# Patient Record
Sex: MALE | Race: WHITE | NOT HISPANIC OR LATINO | URBAN - METROPOLITAN AREA
[De-identification: names, ages, dates, MRNs, and addresses within clinical notes are randomized per-mention and may not be internally consistent; named-entity substitution may affect disease eponyms.]

---

## 2018-07-26 ENCOUNTER — INPATIENT (INPATIENT)
Facility: HOSPITAL | Age: 68
LOS: 1 days | Discharge: HOME | DRG: 247 | End: 2018-07-28
Attending: INTERNAL MEDICINE | Admitting: HOSPITALIST
Payer: SELF-PAY

## 2018-07-26 VITALS — WEIGHT: 240.08 LBS | HEIGHT: 72 IN

## 2018-07-26 DIAGNOSIS — I20.0 UNSTABLE ANGINA: ICD-10-CM

## 2018-07-26 LAB
ALBUMIN SERPL ELPH-MCNC: 3.9 G/DL — SIGNIFICANT CHANGE UP (ref 3.3–5.2)
ALP SERPL-CCNC: 76 U/L — SIGNIFICANT CHANGE UP (ref 40–120)
ALT FLD-CCNC: 21 U/L — SIGNIFICANT CHANGE UP
ANION GAP SERPL CALC-SCNC: 13 MMOL/L — SIGNIFICANT CHANGE UP (ref 5–17)
AST SERPL-CCNC: 40 U/L — HIGH
BASOPHILS # BLD AUTO: 0 K/UL — SIGNIFICANT CHANGE UP (ref 0–0.2)
BASOPHILS NFR BLD AUTO: 0.3 % — SIGNIFICANT CHANGE UP (ref 0–2)
BILIRUB SERPL-MCNC: 0.2 MG/DL — LOW (ref 0.4–2)
BLD GP AB SCN SERPL QL: SIGNIFICANT CHANGE UP
BUN SERPL-MCNC: 23 MG/DL — HIGH (ref 8–20)
CALCIUM SERPL-MCNC: 9.6 MG/DL — SIGNIFICANT CHANGE UP (ref 8.6–10.2)
CHLORIDE SERPL-SCNC: 98 MMOL/L — SIGNIFICANT CHANGE UP (ref 98–107)
CO2 SERPL-SCNC: 22 MMOL/L — SIGNIFICANT CHANGE UP (ref 22–29)
CREAT SERPL-MCNC: 0.77 MG/DL — SIGNIFICANT CHANGE UP (ref 0.5–1.3)
EOSINOPHIL # BLD AUTO: 0 K/UL — SIGNIFICANT CHANGE UP (ref 0–0.5)
EOSINOPHIL NFR BLD AUTO: 0.3 % — SIGNIFICANT CHANGE UP (ref 0–5)
GLUCOSE SERPL-MCNC: 149 MG/DL — HIGH (ref 70–115)
HCT VFR BLD CALC: 46.9 % — SIGNIFICANT CHANGE UP (ref 42–52)
HGB BLD-MCNC: 15.7 G/DL — SIGNIFICANT CHANGE UP (ref 14–18)
LYMPHOCYTES # BLD AUTO: 1 K/UL — SIGNIFICANT CHANGE UP (ref 1–4.8)
LYMPHOCYTES # BLD AUTO: 11.7 % — LOW (ref 20–55)
MCHC RBC-ENTMCNC: 29 PG — SIGNIFICANT CHANGE UP (ref 27–31)
MCHC RBC-ENTMCNC: 33.5 G/DL — SIGNIFICANT CHANGE UP (ref 32–36)
MCV RBC AUTO: 86.7 FL — SIGNIFICANT CHANGE UP (ref 80–94)
MONOCYTES # BLD AUTO: 0.2 K/UL — SIGNIFICANT CHANGE UP (ref 0–0.8)
MONOCYTES NFR BLD AUTO: 2.8 % — LOW (ref 3–10)
NEUTROPHILS # BLD AUTO: 7.4 K/UL — SIGNIFICANT CHANGE UP (ref 1.8–8)
NEUTROPHILS NFR BLD AUTO: 84.6 % — HIGH (ref 37–73)
PLATELET # BLD AUTO: 205 K/UL — SIGNIFICANT CHANGE UP (ref 150–400)
POTASSIUM SERPL-MCNC: 5.2 MMOL/L — SIGNIFICANT CHANGE UP (ref 3.5–5.3)
POTASSIUM SERPL-SCNC: 5.2 MMOL/L — SIGNIFICANT CHANGE UP (ref 3.5–5.3)
PROT SERPL-MCNC: 7.7 G/DL — SIGNIFICANT CHANGE UP (ref 6.6–8.7)
RBC # BLD: 5.41 M/UL — SIGNIFICANT CHANGE UP (ref 4.6–6.2)
RBC # FLD: 14.4 % — SIGNIFICANT CHANGE UP (ref 11–15.6)
SODIUM SERPL-SCNC: 133 MMOL/L — LOW (ref 135–145)
TROPONIN T SERPL-MCNC: 0.05 NG/ML — SIGNIFICANT CHANGE UP (ref 0–0.06)
WBC # BLD: 8.7 K/UL — SIGNIFICANT CHANGE UP (ref 4.8–10.8)
WBC # FLD AUTO: 8.7 K/UL — SIGNIFICANT CHANGE UP (ref 4.8–10.8)

## 2018-07-26 PROCEDURE — 71045 X-RAY EXAM CHEST 1 VIEW: CPT | Mod: 26

## 2018-07-26 PROCEDURE — 99223 1ST HOSP IP/OBS HIGH 75: CPT

## 2018-07-26 PROCEDURE — 93010 ELECTROCARDIOGRAM REPORT: CPT | Mod: 76

## 2018-07-26 PROCEDURE — 93306 TTE W/DOPPLER COMPLETE: CPT | Mod: 26

## 2018-07-26 PROCEDURE — 99285 EMERGENCY DEPT VISIT HI MDM: CPT

## 2018-07-26 RX ORDER — NICOTINE POLACRILEX 2 MG
1 GUM BUCCAL DAILY
Qty: 0 | Refills: 0 | Status: DISCONTINUED | OUTPATIENT
Start: 2018-07-26 | End: 2018-07-28

## 2018-07-26 RX ORDER — TICAGRELOR 90 MG/1
90 TABLET ORAL
Qty: 0 | Refills: 0 | Status: DISCONTINUED | OUTPATIENT
Start: 2018-07-26 | End: 2018-07-27

## 2018-07-26 RX ORDER — ATORVASTATIN CALCIUM 80 MG/1
80 TABLET, FILM COATED ORAL AT BEDTIME
Qty: 0 | Refills: 0 | Status: DISCONTINUED | OUTPATIENT
Start: 2018-07-26 | End: 2018-07-28

## 2018-07-26 RX ORDER — ACETAMINOPHEN 500 MG
650 TABLET ORAL EVERY 6 HOURS
Qty: 0 | Refills: 0 | Status: DISCONTINUED | OUTPATIENT
Start: 2018-07-26 | End: 2018-07-28

## 2018-07-26 RX ORDER — LOSARTAN POTASSIUM 100 MG/1
25 TABLET, FILM COATED ORAL DAILY
Qty: 0 | Refills: 0 | Status: DISCONTINUED | OUTPATIENT
Start: 2018-07-26 | End: 2018-07-28

## 2018-07-26 RX ORDER — ATORVASTATIN CALCIUM 80 MG/1
20 TABLET, FILM COATED ORAL AT BEDTIME
Qty: 0 | Refills: 0 | Status: DISCONTINUED | OUTPATIENT
Start: 2018-07-26 | End: 2018-07-26

## 2018-07-26 RX ORDER — SODIUM CHLORIDE 9 MG/ML
500 INJECTION INTRAMUSCULAR; INTRAVENOUS; SUBCUTANEOUS
Qty: 0 | Refills: 0 | Status: DISCONTINUED | OUTPATIENT
Start: 2018-07-26 | End: 2018-07-27

## 2018-07-26 RX ORDER — ONDANSETRON 8 MG/1
4 TABLET, FILM COATED ORAL ONCE
Qty: 0 | Refills: 0 | Status: COMPLETED | OUTPATIENT
Start: 2018-07-26 | End: 2018-07-26

## 2018-07-26 RX ORDER — METOPROLOL TARTRATE 50 MG
50 TABLET ORAL
Qty: 0 | Refills: 0 | Status: DISCONTINUED | OUTPATIENT
Start: 2018-07-26 | End: 2018-07-26

## 2018-07-26 RX ORDER — ASPIRIN/CALCIUM CARB/MAGNESIUM 324 MG
325 TABLET ORAL ONCE
Qty: 0 | Refills: 0 | Status: COMPLETED | OUTPATIENT
Start: 2018-07-26 | End: 2018-07-26

## 2018-07-26 RX ORDER — ASPIRIN/CALCIUM CARB/MAGNESIUM 324 MG
81 TABLET ORAL DAILY
Qty: 0 | Refills: 0 | Status: DISCONTINUED | OUTPATIENT
Start: 2018-07-27 | End: 2018-07-28

## 2018-07-26 RX ORDER — ONDANSETRON 8 MG/1
4 TABLET, FILM COATED ORAL EVERY 8 HOURS
Qty: 0 | Refills: 0 | Status: DISCONTINUED | OUTPATIENT
Start: 2018-07-26 | End: 2018-07-28

## 2018-07-26 RX ADMIN — ONDANSETRON 4 MILLIGRAM(S): 8 TABLET, FILM COATED ORAL at 19:13

## 2018-07-26 RX ADMIN — LOSARTAN POTASSIUM 25 MILLIGRAM(S): 100 TABLET, FILM COATED ORAL at 22:24

## 2018-07-26 RX ADMIN — SODIUM CHLORIDE 50 MILLILITER(S): 9 INJECTION INTRAMUSCULAR; INTRAVENOUS; SUBCUTANEOUS at 22:24

## 2018-07-26 RX ADMIN — TICAGRELOR 90 MILLIGRAM(S): 90 TABLET ORAL at 22:23

## 2018-07-26 RX ADMIN — ATORVASTATIN CALCIUM 80 MILLIGRAM(S): 80 TABLET, FILM COATED ORAL at 22:23

## 2018-07-26 RX ADMIN — Medication 30 MILLILITER(S): at 19:13

## 2018-07-26 NOTE — PROGRESS NOTE ADULT - SUBJECTIVE AND OBJECTIVE BOX
s/p [facility]   thrombectomy for ISR  STEPHAN RICHY 4.5 x 30 mm mRCA  STEPHAN RICHY 3.0 x 22 mm dRCA  Tolerated procedure well  Continues to feel nausea received Zofran          REVIEW OF SYSTEMS:  Denies SOB, CP, NV, HA, dizziness, palpitations, site pain    PHYSICAL EXAM: A&Ox3 NAD Skin warm and dry  NEURO: Speech intact +gag +swallow Tongue midline DODSON  NECK: No JVD, trachea midline. Eupneic  HEART:   PULMONARY:  CTA [place]  ABDOMEN: Soft nontender X4 +BS Vdg/eating  EXTREMITIES: Rt Radial site: Rt radial pulse + w/pulse ox on right index finger SaO2>95% RUE w/oneurovascular deficit. Capillary refill <3 sec s/p [facility]   thrombectomy for ISR  STEPHAN RICHY 4.5 x 30 mm mRCA  STEPHAN RICHY 3.0 x 22 mm dRCA  Tolerated procedure well  Continues to feel nausea received Zofran          REVIEW OF SYSTEMS:  Denies SOB, CP, NV, HA, dizziness, palpitations, site pain    PHYSICAL EXAM: A&Ox3 NAD Skin warm and dry  NEURO: Speech intact +gag +swallow Tongue midline DODSON  NECK: No JVD, trachea midline. Eupneic  HEART: RRR S1S2 Tele SR with BBB  PULMONARY:  CTA [place]  2L nasal O2  ABDOMEN: Soft nontender X4 +BS V  EXTREMITIES: Rt Radial site: Rt radial pulse + w/pulse ox on right index finger SaO2>95% RUE w/oneurovascular deficit. Capillary refill <3 sec

## 2018-07-26 NOTE — ED PROVIDER NOTE - OBJECTIVE STATEMENT
66 y/o M, with hx of HTN, HLD, and 7x stents, presents to the ED c/o mid sternal, burning, non radiating, chest pain, onset this morning.  Pt states pain last approximately 2-3 hours.  Pain onset when pt was drinking his coffee this morning.  Denies aggravating factors.  Pt states that sx improved with exertion and after belching.  Pt states that sx have since resolved.  Notes he was last seen by cardiology last year and goes yearly for follow up appointments.  Denies diaphoresis, N/V, abd pain, SOB, cough, or dizziness.  Cardiologist: Dr. Del Valle

## 2018-07-26 NOTE — H&P ADULT - NSHPLABSRESULTS_GEN_ALL_CORE
15.7   8.7    )----------(  205       ( 26 Jul 2018 10:12 )               46.9      133    |  98     |  23.0   ----------------------------<  149        ( 26 Jul 2018 09:58 )  5.2     |  22.0   |  0.77     Ca    9.6        ( 26 Jul 2018 09:58 )    TPro  7.7    /  Alb  3.9    /  TBili  0.2    /  DBili  x      /  AST  40     /  ALT  21     /  AlkPhos  76     ( 26 Jul 2018 09:58 )    LIVER FUNCTIONS - ( 26 Jul 2018 09:58 )  Alb: 3.9 g/dL / Pro: 7.7 g/dL / ALK PHOS: 76 U/L / ALT: 21 U/L / AST: 40 U/L / GGT: x               CAPILLARY BLOOD GLUCOSE    CARDIAC MARKERS ( 26 Jul 2018 09:58 )  x     / 0.05 ng/mL / x     / x     / x          chest xray normal     ekg: t wave inversions

## 2018-07-26 NOTE — ED ADULT TRIAGE NOTE - CHIEF COMPLAINT QUOTE
Patient arrived to ED today with c/o chest pain.  Patient states he has 7 cardiac stents. Cardio Suravi

## 2018-07-26 NOTE — CONSULT NOTE ADULT - SUBJECTIVE AND OBJECTIVE BOX
[facility], [facility]                                   [address] [place] [address]                                                      PHONE: [phone]                                                         FAX: [phone]  [email or web address]  ---------------------------------------------------------------------------------------------------------------------------------    Reason for Consult: chest pain     HPI  67 year old male with history of CAD/MI 2010 PCI to RCA LAD and Diag normal EF NUC 2011 normal perfusion active smoker poor out patient follow up who presents with chest pain mid sternal 5/10 burning none radiating that started this morning lasted couple of hours.  Current chest pain free.  Complaint with CV medications.  Active smoker      PAST MEDICAL & SURGICAL HISTORY:   CAD PCI 2010     No Known Allergies      MEDICATIONS  (STANDING):    MEDICATIONS  (PRN):      Social History:  Cigarettes:       active smoker              Alchohol:    none             Illicit Drug Abuse:  none    ROS: Negative other than as mentioned in HPI.    Vital Signs Last 24 Hrs  T(C): 36.9 (26 Jul 2018 09:13), Max: 36.9 (26 Jul 2018 09:13)  T(F): 98.5 (26 Jul 2018 09:13), Max: 98.5 (26 Jul 2018 09:13)  HR: 47 (26 Jul 2018 09:13) (47 - 47)  BP: 143/76 (26 Jul 2018 09:13) (143/76 - 143/76)  BP(mean): --  RR: 18 (26 Jul 2018 09:13) (18 - 18)  SpO2: 97% (26 Jul 2018 09:13) (97% - 97%)  ICU Vital Signs Last 24 Hrs  KIRSTEN KAUFMAN  I&O's Detail    I&O's Summary    Drug Dosing Weight  KIRSTEN KAUFMAN      PHYSICAL EXAM:  General: Appears well developed, well nourished alert and cooperative.  HEENT: Head; normocephalic, atraumatic.  Eyes: Pupils reactive, cornea wnl.  Neck: Supple, no nodes adenopathy, no NVD or carotid bruit or thyromegaly.  CARDIOVASCULAR: Normal S1 and S2, No murmur, rub, gallop or lift.   LUNGS: No rales, rhonchi or wheeze. Normal breath sounds bilaterally.  ABDOMEN: Soft, nontender without mass or organomegaly. bowel sounds normoactive.  EXTREMITIES: No clubbing, cyanosis or edema. Distal pulses wnl.   SKIN: warm and dry with normal turgor.  NEURO: Alert/oriented x 3/normal motor exam. No pathologic reflexes.    PSYCH: normal affect.        LABS:                        15.7   8.7   )-----------( 205      ( 26 Jul 2018 10:12 )             46.9     07-26    133<L>  |  98  |  23.0<H>  ----------------------------<  149<H>  5.2   |  22.0  |  0.77    Ca    9.6      26 Jul 2018 09:58    TPro  7.7  /  Alb  3.9  /  TBili  0.2<L>  /  DBili  x   /  AST  40<H>  /  ALT  21  /  AlkPhos  76  07-26    KIRSTEN KAUFMAN  CARDIAC MARKERS ( 26 Jul 2018 09:58 )  x     / 0.05 ng/mL / x     / x     / x                RADIOLOGY & ADDITIONAL STUDIES:    INTERPRETATION OF TELEMETRY (personally reviewed):    ECG: NSR none specific inferior changes     ECHO: 2010 normal EF without any sign valvular disease     STRESS TEST: 2011 normal EF     CARDIAC CATHETERIZATION: 2010 patent RCA stents mid LAD STEPHAN Diag PCI     Assessment and Plan:  In summary, KIRSTEN KAUFMAN is an 67y Male with past medical history significant for 67 year old male with history of CAD/MI 2010 PCI to RCA LAD and Diag normal EF NUC 2011 normal perfusion active smoker poor out patient follow up who presents with chest pain mid sternal 5/10 burning none radiating that started this morning lasted couple of hours.  Current chest pain free.  Complaint with CV medications.  Active smoker.  Volume status stable     Plan  1.  Left heart cath today   2.  Continue ASA statin and other CV medications   3.  Smoking cessations     further CV recommendations pending cath findings

## 2018-07-26 NOTE — H&P ADULT - NSHPPHYSICALEXAM_GEN_ALL_CORE
PHYSICAL EXAM:    GENERAL: NAD,   HEAD:  Atraumatic, Normocephalic  EYES: EOMI, PERRLA, conjunctiva and sclera clear  ENMT: No tonsillar erythema, exudates, or enlargement  NECK: Supple, No JVD, Normal thyroid  NERVOUS SYSTEM:  Alert & Oriented X3, Good concentration; Motor Strength 5/5 B/L upper and lower extremities;  CHEST/LUNG: Clear to percussion bilaterally;   CVS: s1s2+ no murmurs  ABDOMEN: Soft, Nontender, Nondistended; Bowel sounds present  EXTREMITIES:  2+ Peripheral Pulses, No edema  LYMPH: No lymphadenopathy noted  SKIN: No rashes or lesions

## 2018-07-26 NOTE — ED ADULT NURSE NOTE - OBJECTIVE STATEMENT
67 yr old male with h/o htn, high cholesterol and 7 stents present to the ER for epigastric pain since this morning . Pt reported that he drinks over 5 cups of coffee per day. Pt also report that he smokes 1 pack of cigarette per day. Report burning pain of 9/10 on pain scale. Pt reported that he took 4 baby Aspirin which alleviated the pain. Denies epigastric burning at this time. Denies N/V/D or SOB.

## 2018-07-26 NOTE — PROGRESS NOTE ADULT - SUBJECTIVE AND OBJECTIVE BOX
Cardiology NP    ASA 2  Mallampati 2    67 year old male with h/o 7 prior cardiac stent who p/w chest pain.  Echo done with results pending.  For [facility] to assess coronary arteries

## 2018-07-26 NOTE — ED PROVIDER NOTE - PMH
HLD (hyperlipidemia)    HTN (hypertension)    Stented coronary artery  x7, last stent placed in 2009

## 2018-07-26 NOTE — ED PROVIDER NOTE - MEDICAL DECISION MAKING DETAILS
Pt seen by cardio, to admit for cath. Advise NO heparin at this timer and pt already took 325 asa prior to arrival

## 2018-07-26 NOTE — H&P ADULT - HISTORY OF PRESENT ILLNESS
Patient is a 67 year old male with history of CAD/MI 2010 PCI to RCA LAD and Diag, active smoker, htn and poor medical follow up presents with chest pain rated 5 out of 10 which occurred suddenly at home. Patient states it felt uncomfortable and lasted a few hours. Patient states the pain is similar to the chest pain in the past. Patient continues to smoke.   Patient on admission had t wave inversions and is being admitted for cath. Patient seen at bedside and denies any complaints

## 2018-07-26 NOTE — ED ADULT NURSE REASSESSMENT NOTE - NS ED NURSE REASSESS COMMENT FT1
pt leaving unit for [facility]ho.
Pt denies complain of epigastric pain/ discomfort at this time. Pt is awaiting bed assignment.

## 2018-07-26 NOTE — H&P ADULT - ASSESSMENT
1) ACS _-> cardio consult appreciated  --> for cath today  --> tte ordered  --> a1c, tsh and lipid ordered    2) htn -> hold betablocker given bradycardia  --> stable    3) smoker --> advised to quit  --> refused patch

## 2018-07-27 ENCOUNTER — TRANSCRIPTION ENCOUNTER (OUTPATIENT)
Age: 68
End: 2018-07-27

## 2018-07-27 DIAGNOSIS — Z95.5 PRESENCE OF CORONARY ANGIOPLASTY IMPLANT AND GRAFT: ICD-10-CM

## 2018-07-27 DIAGNOSIS — I25.10 ATHEROSCLEROTIC HEART DISEASE OF NATIVE CORONARY ARTERY WITHOUT ANGINA PECTORIS: ICD-10-CM

## 2018-07-27 DIAGNOSIS — E78.5 HYPERLIPIDEMIA, UNSPECIFIED: ICD-10-CM

## 2018-07-27 DIAGNOSIS — I10 ESSENTIAL (PRIMARY) HYPERTENSION: ICD-10-CM

## 2018-07-27 LAB
ALBUMIN SERPL ELPH-MCNC: 3.4 G/DL — SIGNIFICANT CHANGE UP (ref 3.3–5.2)
ALP SERPL-CCNC: 75 U/L — SIGNIFICANT CHANGE UP (ref 40–120)
ALT FLD-CCNC: 37 U/L — SIGNIFICANT CHANGE UP
ANION GAP SERPL CALC-SCNC: 10 MMOL/L — SIGNIFICANT CHANGE UP (ref 5–17)
AST SERPL-CCNC: 149 U/L — HIGH
BILIRUB SERPL-MCNC: 0.5 MG/DL — SIGNIFICANT CHANGE UP (ref 0.4–2)
BUN SERPL-MCNC: 15 MG/DL — SIGNIFICANT CHANGE UP (ref 8–20)
CALCIUM SERPL-MCNC: 9.2 MG/DL — SIGNIFICANT CHANGE UP (ref 8.6–10.2)
CHLORIDE SERPL-SCNC: 104 MMOL/L — SIGNIFICANT CHANGE UP (ref 98–107)
CHOLEST SERPL-MCNC: 190 MG/DL — SIGNIFICANT CHANGE UP (ref 110–199)
CO2 SERPL-SCNC: 24 MMOL/L — SIGNIFICANT CHANGE UP (ref 22–29)
CREAT SERPL-MCNC: 0.65 MG/DL — SIGNIFICANT CHANGE UP (ref 0.5–1.3)
GLUCOSE SERPL-MCNC: 119 MG/DL — HIGH (ref 70–115)
HBA1C BLD-MCNC: 5.4 % — SIGNIFICANT CHANGE UP (ref 4–5.6)
HCT VFR BLD CALC: 42.9 % — SIGNIFICANT CHANGE UP (ref 42–52)
HDLC SERPL-MCNC: 43 MG/DL — LOW
HGB BLD-MCNC: 14 G/DL — SIGNIFICANT CHANGE UP (ref 14–18)
LIPID PNL WITH DIRECT LDL SERPL: 120 MG/DL — SIGNIFICANT CHANGE UP
MAGNESIUM SERPL-MCNC: 2 MG/DL — SIGNIFICANT CHANGE UP (ref 1.6–2.6)
MCHC RBC-ENTMCNC: 28.5 PG — SIGNIFICANT CHANGE UP (ref 27–31)
MCHC RBC-ENTMCNC: 32.6 G/DL — SIGNIFICANT CHANGE UP (ref 32–36)
MCV RBC AUTO: 87.4 FL — SIGNIFICANT CHANGE UP (ref 80–94)
PLATELET # BLD AUTO: 198 K/UL — SIGNIFICANT CHANGE UP (ref 150–400)
POTASSIUM SERPL-MCNC: 4 MMOL/L — SIGNIFICANT CHANGE UP (ref 3.5–5.3)
POTASSIUM SERPL-SCNC: 4 MMOL/L — SIGNIFICANT CHANGE UP (ref 3.5–5.3)
PROT SERPL-MCNC: 6.3 G/DL — LOW (ref 6.6–8.7)
RBC # BLD: 4.91 M/UL — SIGNIFICANT CHANGE UP (ref 4.6–6.2)
RBC # FLD: 14.4 % — SIGNIFICANT CHANGE UP (ref 11–15.6)
SODIUM SERPL-SCNC: 138 MMOL/L — SIGNIFICANT CHANGE UP (ref 135–145)
T3 SERPL-MCNC: 119 NG/DL — SIGNIFICANT CHANGE UP (ref 80–200)
T4 AB SER-ACNC: 7.2 UG/DL — SIGNIFICANT CHANGE UP (ref 4.5–12)
TOTAL CHOLESTEROL/HDL RATIO MEASUREMENT: 4 RATIO — SIGNIFICANT CHANGE UP (ref 3.4–9.6)
TRIGL SERPL-MCNC: 137 MG/DL — SIGNIFICANT CHANGE UP (ref 10–200)
TROPONIN T SERPL-MCNC: 2.68 NG/ML — HIGH (ref 0–0.06)
TROPONIN T SERPL-MCNC: 5.82 NG/ML — HIGH (ref 0–0.06)
TSH SERPL-MCNC: <0.1 UIU/ML — LOW (ref 0.27–4.2)
WBC # BLD: 10.4 K/UL — SIGNIFICANT CHANGE UP (ref 4.8–10.8)
WBC # FLD AUTO: 10.4 K/UL — SIGNIFICANT CHANGE UP (ref 4.8–10.8)

## 2018-07-27 PROCEDURE — 93010 ELECTROCARDIOGRAM REPORT: CPT

## 2018-07-27 PROCEDURE — 99233 SBSQ HOSP IP/OBS HIGH 50: CPT

## 2018-07-27 RX ORDER — ATORVASTATIN CALCIUM 80 MG/1
1 TABLET, FILM COATED ORAL
Qty: 30 | Refills: 0
Start: 2018-07-27 | End: 2018-08-25

## 2018-07-27 RX ORDER — CLOPIDOGREL BISULFATE 75 MG/1
1 TABLET, FILM COATED ORAL
Qty: 30 | Refills: 0
Start: 2018-07-27 | End: 2018-08-25

## 2018-07-27 RX ORDER — TICAGRELOR 90 MG/1
1 TABLET ORAL
Qty: 60 | Refills: 0 | OUTPATIENT
Start: 2018-07-27 | End: 2018-08-25

## 2018-07-27 RX ORDER — ATORVASTATIN CALCIUM 80 MG/1
1 TABLET, FILM COATED ORAL
Qty: 30 | Refills: 0 | OUTPATIENT
Start: 2018-07-27 | End: 2018-08-25

## 2018-07-27 RX ORDER — LOSARTAN POTASSIUM 100 MG/1
1 TABLET, FILM COATED ORAL
Qty: 30 | Refills: 0 | OUTPATIENT
Start: 2018-07-27 | End: 2018-08-25

## 2018-07-27 RX ORDER — LOSARTAN POTASSIUM 100 MG/1
1 TABLET, FILM COATED ORAL
Qty: 30 | Refills: 0
Start: 2018-07-27 | End: 2018-08-25

## 2018-07-27 RX ORDER — CLOPIDOGREL BISULFATE 75 MG/1
75 TABLET, FILM COATED ORAL DAILY
Qty: 0 | Refills: 0 | Status: DISCONTINUED | OUTPATIENT
Start: 2018-07-27 | End: 2018-07-28

## 2018-07-27 RX ORDER — NICOTINE POLACRILEX 2 MG
1 GUM BUCCAL
Qty: 30 | Refills: 0
Start: 2018-07-27 | End: 2018-08-25

## 2018-07-27 RX ORDER — ASPIRIN/CALCIUM CARB/MAGNESIUM 324 MG
1 TABLET ORAL
Qty: 30 | Refills: 0 | OUTPATIENT
Start: 2018-07-27 | End: 2018-08-25

## 2018-07-27 RX ORDER — ASPIRIN/CALCIUM CARB/MAGNESIUM 324 MG
1 TABLET ORAL
Qty: 30 | Refills: 0
Start: 2018-07-27 | End: 2018-08-25

## 2018-07-27 RX ORDER — NICOTINE POLACRILEX 2 MG
1 GUM BUCCAL
Qty: 30 | Refills: 0 | OUTPATIENT
Start: 2018-07-27 | End: 2018-08-25

## 2018-07-27 RX ADMIN — TICAGRELOR 90 MILLIGRAM(S): 90 TABLET ORAL at 17:13

## 2018-07-27 RX ADMIN — LOSARTAN POTASSIUM 25 MILLIGRAM(S): 100 TABLET, FILM COATED ORAL at 06:49

## 2018-07-27 RX ADMIN — TICAGRELOR 90 MILLIGRAM(S): 90 TABLET ORAL at 09:30

## 2018-07-27 RX ADMIN — CLOPIDOGREL BISULFATE 75 MILLIGRAM(S): 75 TABLET, FILM COATED ORAL at 20:18

## 2018-07-27 RX ADMIN — ATORVASTATIN CALCIUM 80 MILLIGRAM(S): 80 TABLET, FILM COATED ORAL at 20:18

## 2018-07-27 RX ADMIN — Medication 81 MILLIGRAM(S): at 11:15

## 2018-07-27 NOTE — PROGRESS NOTE ADULT - SUBJECTIVE AND OBJECTIVE BOX
KIRSTEN KAUFMAN    [id number]    67y      Male    INTERVAL HPI/OVERNIGHT EVENTS:    med accept note:  patient is a 67 yom with pmh of cad and smoker presents with chest pain and is s.p cath with placement of stents and was observed overnight in MICU for sinus pauses. Patient seen at bedside and states feeling well      REVIEW OF SYSTEMS:    CONSTITUTIONAL: No fever, weight loss, or fatigue  RESPIRATORY: No cough, wheezing, hemoptysis; No shortness of breath  CARDIOVASCULAR: No chest pain, palpitations  GASTROINTESTINAL: No abdominal or epigastric pain. No nausea, vomiting  NEUROLOGICAL: No headaches, memory loss, loss of strength.  MISCELLANEOUS:      Vital Signs Last 24 Hrs  T(C): 36.3 (27 Jul 2018 08:00), Max: 37.2 (26 Jul 2018 21:45)  T(F): 97.3 (27 Jul 2018 08:00), Max: 98.9 (26 Jul 2018 21:45)  HR: 62 (27 Jul 2018 10:00) (52 - 65)  BP: 100/57 (27 Jul 2018 10:00) (100/57 - 194/91)  BP(mean): 79 (27 Jul 2018 09:00) (77 - 130)  RR: 17 (27 Jul 2018 08:00) (16 - 26)  SpO2: 98% (27 Jul 2018 10:00) (95% - 100%)    PHYSICAL EXAM:      	GENERAL: NAD,   	ENMT: No tonsillar erythema,  	NECK: Supple, No JVD, Normal thyroid  	NERVOUS SYSTEM:  Alert & Oriented X3, Good concentration;   	CHEST/LUNG: Clear to percussion bilaterally;   	CVS: s1s2+ no murmurs  	ABDOMEN: Soft, Nontender, Nondistended;   	EXTREMITIES:  2+ Peripheral Pulses, No edema  	LYMPH: No lymphadenopathy noted  SKIN: No rashes or lesions      LABS:                        14.0   10.4  )-----------( 198      ( 27 Jul 2018 04:28 )             42.9     07-27    138  |  104  |  15.0  ----------------------------<  119<H>  4.0   |  24.0  |  0.65    Ca    9.2      27 Jul 2018 04:28  Mg     2.0     07-27    TPro  6.3<L>  /  Alb  3.4  /  TBili  0.5  /  DBili  x   /  AST  149<H>  /  ALT  37  /  AlkPhos  75  07-27            MEDICATIONS  (STANDING):  aspirin  chewable 81 milliGRAM(s) Oral daily  atorvastatin 80 milliGRAM(s) Oral at bedtime  losartan 25 milliGRAM(s) Oral daily  nicotine -   7 mG/24Hr(s) Patch 1 Patch Transdermal daily  ticagrelor 90 milliGRAM(s) Oral two times a day    MEDICATIONS  (PRN):  acetaminophen   Tablet. 650 milliGRAM(s) Oral every 6 hours PRN Mild Pain (1 - 3)  aluminum hydroxide/magnesium hydroxide/simethicone Suspension 30 milliLiter(s) Oral every 6 hours PRN Dyspepsia  ondansetron Injectable 4 milliGRAM(s) IV Push every 8 hours PRN Nausea and/or Vomiting      RADIOLOGY & ADDITIONAL TESTS:    tte - performed

## 2018-07-27 NOTE — PROGRESS NOTE ADULT - PROBLEM SELECTOR PLAN 1
[facility] Report by Dr. Sharma  Evolving MI trend troponins/ECG  AM Labs/ECG  RRA site care w/instructions  Smoking cessation  Aggressive CV risk factors including diet, compliance, exercise, plant based foods, smoking cessation reinforced w/pt
2 stents to the RCA, continue current management consider bb in am if stable (holding due to pauses and few episodes of bradycardia overnight)

## 2018-07-27 NOTE — DISCHARGE NOTE ADULT - CARE PLAN
Principal Discharge DX:	Coronary artery disease  Goal:	s/p stent  Assessment and plan of treatment:	dc with asa and brillinta  follow up with cardio  Secondary Diagnosis:	Unstable angina  Goal:	plan as per #1  Secondary Diagnosis:	HLD (hyperlipidemia)  Goal:	statin  Secondary Diagnosis:	Smoker  Goal:	patch  Assessment and plan of treatment:	advised to quit  Secondary Diagnosis:	Sinus pause  Goal:	NO BETA BLOCKER  Assessment and plan of treatment:	follow up with cardio Principal Discharge DX:	Coronary artery disease  Goal:	s/p stent  Assessment and plan of treatment:	dc with asa and plavix  follow up with cardio  Secondary Diagnosis:	Unstable angina  Goal:	plan as per #1  Secondary Diagnosis:	HLD (hyperlipidemia)  Goal:	statin  Secondary Diagnosis:	Smoker  Goal:	patch  Assessment and plan of treatment:	advised to quit  Secondary Diagnosis:	Sinus pause  Goal:	NO BETA BLOCKER  Assessment and plan of treatment:	follow up with cardio

## 2018-07-27 NOTE — CONSULT NOTE ADULT - SUBJECTIVE AND OBJECTIVE BOX
Patient is a 67y old  Male who presents with a chief complaint of chest pain (26 Jul 2018 11:56)      BRIEF HOSPITAL COURSE: Pt presented to ED on 7/26 c/o chest pain. Pt was found to have inferior wall T-wave inversions, and was taken to the cath lab by Dr. Cheikh Sharma. During the procedure, 2 stents were placed in RCA, however pt noted to have multiple 3 second sinus pauses on his ECG tracing during procedure. Pt admitted to MICU for close monitoring due to concern of continued inessa dysfunction, and possibility of requiring a transvenous pacer, should these pauses worsen.  At this time, pt states chest pain and nausea has resolved, and his only current complaint is feeling slightly lethargic since the procedure. Pt denies LOC, dizziness, dyspnea, abd pain, NVD, extremity pain x 4, paresthesias / numbness, or changes in strength.     Events last 24 hours: ***    PAST MEDICAL & SURGICAL HISTORY:  Stented coronary artery: x7, last stent placed in 2009  HLD (hyperlipidemia)  HTN (hypertension)  No significant past surgical history      SOCIAL HISTORY:    Review of Systems:  CONSTITUTIONAL: Admits to mild fatigue. No fever, chills.   EYES: No eye pain, visual disturbances, or discharge  ENMT:  No difficulty hearing, tinnitus, vertigo; No sinus or throat pain  NECK: No pain or stiffness  RESPIRATORY: No cough, wheezing, chills or hemoptysis; No shortness of breath  CARDIOVASCULAR: No chest pain, palpitations, dizziness, or leg swelling  GASTROINTESTINAL: No abdominal or epigastric pain. No nausea, vomiting, or hematemesis; No diarrhea or constipation. No melena or hematochezia.  GENITOURINARY: No dysuria, frequency, hematuria, or incontinence  NEUROLOGICAL: No headaches, memory loss, loss of strength, numbness, or tremors  SKIN: No itching, burning, rashes, or lesions   MUSCULOSKELETAL: No joint pain or swelling; No muscle, back, or extremity pain  PSYCHIATRIC: No depression, anxiety, mood swings, or difficulty sleeping    [  ] Due to altered mental status/intubation, subjective information was not able to be obtained from the patient. History was obtained, to the extent possible, from review of the chart and collateral sources of information.      Medications:    losartan 25 milliGRAM(s) Oral daily      acetaminophen   Tablet. 650 milliGRAM(s) Oral every 6 hours PRN  ondansetron Injectable 4 milliGRAM(s) IV Push every 8 hours PRN      aspirin  chewable 81 milliGRAM(s) Oral daily  ticagrelor 90 milliGRAM(s) Oral two times a day    aluminum hydroxide/magnesium hydroxide/simethicone Suspension 30 milliLiter(s) Oral every 6 hours PRN      atorvastatin 80 milliGRAM(s) Oral at bedtime    sodium chloride 0.9%. 500 milliLiter(s) IV Continuous <Continuous>        nicotine -   7 mG/24Hr(s) Patch 1 Patch Transdermal daily          ICU Vital Signs Last 24 Hrs  T(C): 36.8 (26 Jul 2018 23:30), Max: 37.2 (26 Jul 2018 21:45)  T(F): 98.3 (26 Jul 2018 23:30), Max: 98.9 (26 Jul 2018 21:45)  HR: 58 (27 Jul 2018 00:00) (47 - 65)  BP: 127/58 (27 Jul 2018 00:00) (127/58 - 194/91)  BP(mean): 84 (27 Jul 2018 00:00) (84 - 130)  ABP: --  ABP(mean): --  RR: 17 (27 Jul 2018 00:00) (16 - 22)  SpO2: 97% (27 Jul 2018 00:00) (96% - 100%)          I&O's Detail    26 Jul 2018 07:01  -  27 Jul 2018 00:53  --------------------------------------------------------  IN:    Oral Fluid: 100 mL    sodium chloride 0.9%.: 200 mL  Total IN: 300 mL    OUT:    Voided: 560 mL  Total OUT: 560 mL    Total NET: -260 mL            LABS:                        15.7   8.7   )-----------( 205      ( 26 Jul 2018 10:12 )             46.9     07-26    133<L>  |  98  |  23.0<H>  ----------------------------<  149<H>  5.2   |  22.0  |  0.77    Ca    9.6      26 Jul 2018 09:58    TPro  7.7  /  Alb  3.9  /  TBili  0.2<L>  /  DBili  x   /  AST  40<H>  /  ALT  21  /  AlkPhos  76  07-26      CARDIAC MARKERS ( 26 Jul 2018 09:58 )  x     / 0.05 ng/mL / x     / x     / x          CAPILLARY BLOOD GLUCOSE            CULTURES:      Physical Examination:    General: No acute distress.      HEENT: Pupils equal, reactive to light.  Symmetric.    PULM: Clear to auscultation bilaterally, no significant sputum production    CVS: Regular rate and rhythm, no murmurs, rubs, or gallops    ABD: Soft, nondistended, nontender, normoactive bowel sounds, no masses    EXT: Right radial band in place w/o evidence of hematoma, ecchymosis or oozing. No edema, nontender    SKIN: Warm and well perfused, no rashes noted.    NEURO: Alert, oriented, interactive, nonfocal    RADIOLOGY: ***    INVASIVE LINES:  INDWELLING SPICER:  VTE PROPHYLAXIS:  CAM ICU:  CODE STATUS:    CRITICAL CARE TIME SPENT: *** minutes assessing presenting problems of acute illness, which pose high probability of life threatening deterioration or end organ damage/dysfunction, as well as medical decision making including initiating plan of care, reviewing data, reviewing radiologic exams, discussing with multidisciplinary team, non-inclusive of procedures performed, discussing goals of care with patient/family Patient is a 67y old  Male who presents with a chief complaint of chest pain (26 Jul 2018 11:56)      BRIEF HOSPITAL COURSE: 68 yo male, PMHx CAD with prior PCI (7 stents), HTN, HLD, who presented to ED c/o mid-inferior chest pain, non-radiating, described as "burning" associated with nausea that onset after waking at 05:00 to get ready for work. Pt was found to have inferior wall T-wave inversions, and was taken to the cath lab by Dr. Cheikh Sharma. During the procedure, 2 stents were placed in RCA, however pt noted to have multiple 3 second sinus pauses on his ECG tracing during procedure. ICU consult was called.    Events last 24 hours: Pt received in MICU post-cardiac cath. R radial band in place, no sensory or motor deficits, no bleed. At this time, pt states chest pain and nausea have resolved, and his only current complaint is feeling tired.       PAST MEDICAL & SURGICAL HISTORY:  Stented coronary artery: x7, last stent placed in 2009  HLD (hyperlipidemia)  HTN (hypertension)  No significant past surgical history      SOCIAL HISTORY: current active smoker      Review of Systems:  CONSTITUTIONAL: Admits to mild fatigue. No fever, chills.   EYES: No eye pain, visual disturbances, or discharge  ENMT:  No difficulty hearing, tinnitus, vertigo; No sinus or throat pain  NECK: No pain or stiffness  RESPIRATORY: No cough, wheezing, chills or hemoptysis; No shortness of breath  CARDIOVASCULAR: No chest pain, palpitations, dizziness, or leg swelling  GASTROINTESTINAL: No abdominal or epigastric pain. No nausea, vomiting, or hematemesis; No diarrhea or constipation. No melena or hematochezia.  GENITOURINARY: No dysuria, frequency, hematuria, or incontinence  NEUROLOGICAL: No headaches, memory loss, loss of strength, numbness, or tremors  SKIN: No itching, burning, rashes, or lesions   MUSCULOSKELETAL: No joint pain or swelling; No muscle, back, or extremity pain  PSYCHIATRIC: No depression, anxiety, mood swings, or difficulty sleeping      Medications:  losartan 25 milliGRAM(s) Oral daily  acetaminophen   Tablet. 650 milliGRAM(s) Oral every 6 hours PRN  ondansetron Injectable 4 milliGRAM(s) IV Push every 8 hours PRN  aspirin  chewable 81 milliGRAM(s) Oral daily  ticagrelor 90 milliGRAM(s) Oral two times a day  aluminum hydroxide/magnesium hydroxide/simethicone Suspension 30 milliLiter(s) Oral every 6 hours PRN  atorvastatin 80 milliGRAM(s) Oral at bedtime  sodium chloride 0.9%. 500 milliLiter(s) IV Continuous <Continuous>  nicotine -   7 mG/24Hr(s) Patch 1 Patch Transdermal daily      ICU Vital Signs Last 24 Hrs  T(C): 36.8 (26 Jul 2018 23:30), Max: 37.2 (26 Jul 2018 21:45)  T(F): 98.3 (26 Jul 2018 23:30), Max: 98.9 (26 Jul 2018 21:45)  HR: 58 (27 Jul 2018 00:00) (47 - 65)  BP: 127/58 (27 Jul 2018 00:00) (127/58 - 194/91)  BP(mean): 84 (27 Jul 2018 00:00) (84 - 130)  ABP: --  ABP(mean): --  RR: 17 (27 Jul 2018 00:00) (16 - 22)  SpO2: 97% (27 Jul 2018 00:00) (96% - 100%)      I&O's Detail    26 Jul 2018 07:01  -  27 Jul 2018 00:53  --------------------------------------------------------  IN:    Oral Fluid: 100 mL    sodium chloride 0.9%.: 200 mL  Total IN: 300 mL    OUT:    Voided: 560 mL  Total OUT: 560 mL    Total NET: -260 mL      LABS:                        15.7   8.7   )-----------( 205      ( 26 Jul 2018 10:12 )             46.9     07-26    133<L>  |  98  |  23.0<H>  ----------------------------<  149<H>  5.2   |  22.0  |  0.77    Ca    9.6      26 Jul 2018 09:58    TPro  7.7  /  Alb  3.9  /  TBili  0.2<L>  /  DBili  x   /  AST  40<H>  /  ALT  21  /  AlkPhos  76  07-26      CARDIAC MARKERS ( 26 Jul 2018 09:58 )  x     / 0.05 ng/mL / x     / x     / x          CAPILLARY BLOOD GLUCOSE      CULTURES:      Physical Examination:    General: No acute distress.      HEENT: Pupils equal, reactive to light.  Symmetric.    PULM: Clear to auscultation bilaterally, no significant sputum production    CVS: Sinus bradycardia, no murmurs, rubs, or gallops    ABD: Soft, nondistended, nontender, normoactive bowel sounds, no masses    EXT: Right radial band in place w/o evidence of hematoma, ecchymosis or oozing. No sensory or motor deficits of R hand, radial/ulnar/median nerves in tact.     SKIN: Warm and well perfused, no rashes noted.    NEURO: Alert, oriented, interactive, nonfocal        RADIOLOGY: < from: Xray Chest 1 View AP/PA. (07.26.18 @ 10:26) >     EXAM:  XR CHEST AP OR PA 1V                          PROCEDURE DATE:  07/26/2018      INTERPRETATION:  Portable chest radiograph        CLINICAL INFORMATION: Chest pain    TECHNIQUE:  Portable  AP view of the chest was obtained.    COMPARISON:9/10/2012 available for review.    FINDINGS:    The lungs  are clear.  No pleural abnormality is seen.         The  heart is enlarged in transverse diameter. No hilar mass. Trachea   midline.        Visualized osseous structures are intact.    IMPRESSION:   No evidence of active chest disease.            PAVAN PADILLA M.D., ATTENDING RADIOLOGIST  This document has been electronically signed. Jul 26 2018 10:39AM        INVASIVE LINES: X   INDWELLING SPICER: X   CAM ICU: -  CODE STATUS: F    CRITICAL CARE TIME SPENT: 45 minutes assessing presenting problems of acute illness, which pose high probability of life threatening deterioration or end organ damage/dysfunction, as well as medical decision making including initiating plan of care, reviewing data, reviewing radiologic exams, discussing with multidisciplinary team, non-inclusive of procedures performed.

## 2018-07-27 NOTE — DISCHARGE NOTE ADULT - CARE PROVIDER_API CALL
Patrick Wiggins), Cardiovascular Disease; Internal Medicine  [address] [place] [id number]  Phone: [phone]  Fax: [phone]    Sixto Dos Santos), Internal Medicine  [address] [place] [address] [address]  [place] [address]  Phone: [phone]  Fax: [phone]

## 2018-07-27 NOTE — DISCHARGE NOTE ADULT - HOSPITAL COURSE
Patient is a 67 year old male with history of CAD/MI 2010 PCI to RCA LAD and Diag, active smoker, htn and poor medical follow up presents with chest pain rated 5 out of 10 which occurred suddenly at home. Patient states it felt uncomfortable and lasted a few hours. Patient states the pain is similar to the chest pain in the past. Patient continues to smoke.   Patient on admission had t wave inversions and is being admitted for cath.     patient had cath same day:    DIAGNOSTIC IMPRESSIONS: Prior stent in LAD is patent with 100% occluded  RCA. The RCA was treated with thrombectomy/PTCA/STENT ([facility]) with  good result.  DIAGNOSTIC RECOMMENDATIONS: ASA and Brilinta    patient then noticed to have sinus pauses and was monitored in icu overnight. patient downgraded to medicine following day     time spent on dc 32 minutes Patient is a 67 year old male with history of CAD/MI 2010 PCI to RCA LAD and Diag, active smoker, htn and poor medical follow up presents with chest pain rated 5 out of 10 which occurred suddenly at home. Patient states it felt uncomfortable and lasted a few hours. Patient states the pain is similar to the chest pain in the past. Patient continues to smoke.   Patient on admission had t wave inversions and is being admitted for cath.     patient had cath same day:    DIAGNOSTIC IMPRESSIONS: Prior stent in LAD is patent with 100% occluded  RCA. The RCA was treated with thrombectomy/PTCA/STENT ([facility]) with  good result.  DIAGNOSTIC RECOMMENDATIONS: ASA and Brilinta    patient then noticed to have sinus pauses and was monitored in icu overnight. patient downgraded to medicine following day   patient cannot afford brilinta and will be started on plavix     time spent on dc 32 minutes Patient is a 67 year old male with history of CAD/MI 2010 PCI to RCA LAD and Diag, active smoker, htn and poor medical follow up presents with chest pain rated 5 out of 10 which occurred suddenly at home. Patient states it felt uncomfortable and lasted a few hours. Patient states the pain is similar to the chest pain in the past. Patient continues to smoke.   Patient on admission had t wave inversions and is being admitted for cath.     patient had cath same day:    DIAGNOSTIC IMPRESSIONS: Prior stent in LAD is patent with 100% occluded  RCA. The RCA was treated with thrombectomy/PTCA/STENT ([facility]) with  good result.  DIAGNOSTIC RECOMMENDATIONS: ASA and Brilinta    patient then noticed to have sinus pauses and was monitored in icu overnight. patient downgraded to medicine following day   patient cannot afford brilinta and will be started on plavix     Vital Signs Last 24 Hrs  T(C): 36.6 (07-28-18 @ 05:06), Max: 37.1 (07-27-18 @ 16:50)  T(F): 97.9 (07-28-18 @ 05:06), Max: 98.8 (07-27-18 @ 16:50)  HR: 70 (07-28-18 @ 05:06) (52 - 70)  BP: 124/69 (07-28-18 @ 05:06) (100/57 - 124/69)  BP(mean): --  RR: 18 (07-28-18 @ 05:06) (16 - 18)  SpO2: 98% (07-28-18 @ 05:06) (96% - 98%)  GENERAL: NAD, well-groomed, well-developed  HEAD:  Atraumatic, Normocephalic  EYES: EOMI, conjunctiva and sclera clear  NECK: Supple, No JVD, Normal thyroid  NERVOUS SYSTEM:  Alert & Oriented X 3, Motor Strength 3-4/5 B/L upper and lower extremities;  CHEST/LUNG: Clear to auscultate bilaterally; No rales, rhonchi, wheezing, or rubs  HEART: Regular rate and rhythm; No murmurs, rubs, or gallops  ABDOMEN: Soft, Nontender, Nondistended; Bowel sounds present  EXTREMITIES:  2+ Peripheral Pulses, No clubbing, cyanosis, or edema  SKIN: No rashes or lesions      time spent on dc 32 minutes

## 2018-07-27 NOTE — PROGRESS NOTE ADULT - SUBJECTIVE AND OBJECTIVE BOX
Nurse Practitioner Progress note  s/p LHC via right radial. No bleeding, bruising, hematoma. Patient with no complaints.   thrombectomy for ISR  STEPHAN RICHY 4.5 x 30 mm mRCA  STEPHAN RICHY 3.0 x 22 mm dRCA    MEDICATIONS:  losartan 25 milliGRAM(s) Oral daily  acetaminophen   Tablet. 650 milliGRAM(s) Oral every 6 hours PRN  ondansetron Injectable 4 milliGRAM(s) IV Push every 8 hours PRN  aluminum hydroxide/magnesium hydroxide/simethicone Suspension 30 milliLiter(s) Oral every 6 hours PRN  atorvastatin 80 milliGRAM(s) Oral at bedtime  apirin  chewable 81 milliGRAM(s) Oral daily  ticagrelor 90 milliGRAM(s) Oral two times a day     T(C): 36.3 (07-27-18 @ 08:00), Max: 37.2 (07-26-18 @ 21:45)  HR: 52 (07-27-18 @ 12:00) (52 - 65)  BP: 112/52 (07-27-18 @ 12:00) (100/57 - 194/91)  RR: 17 (07-27-18 @ 08:00) (16 - 26)  SpO2: 97% (07-27-18 @ 12:00) (95% - 100%)  Wt(kg): --    PHYSICAL EXAM:  Appearance: Normal	  HEENT:   Normal oral mucosa,  Cardiovascular: Normal S1 S2, No JVD, No murmurs, No edema  Respiratory: Lungs clear to auscultation	  Gastrointestinal:  Soft, Non-tender, + BS	  Skin: No rashes, No ecchymoses, No cyanosis  Neurologic: Non-focal, A&O X3.  No neuro deficits  Extremities: Normal range of motion, No clubbing, cyanosis or edema  Vascular: Peripheral pulses palpable 2+ bilaterally                            14.0   10.4  )-----------( 198      ( 27 Jul 2018 04:28 )             42.9     07-27    138  |  104  |  15.0  ----------------------------<  119<H>  4.0   |  24.0  |  0.65    Ca    9.2      27 Jul 2018 04:28  Mg     2.0     07-27    TPro  6.3<L>  /  Alb  3.4  /  TBili  0.5  /  DBili  x   /  AST  149<H>  /  ALT  37  /  AlkPhos  75  07-27      ASSESSMENT/PLAN: 	  -Follow up with Cardiology as directed.   -Further plan per primary team

## 2018-07-27 NOTE — CONSULT NOTE ADULT - PROBLEM SELECTOR RECOMMENDATION 9
- Maintain cardiac monitoring. If sinus pauses continue due to suspected inessa dysfunction, will consider use of transvenous pacer.  - Post AMI care, as per cardiology: Lipitor, Losartan, Brilinta, ASA  - Morning labs to include: CMP, CBC, HbA1C, Lipid panel, Mg, TSH, Troponin Admit to MICU with close cardiac monitoring, serial EKGs.   If sinus pauses persist due to suspected inessa dysfunction secondary to RCA infarct, will insert transvenous pacer.  Lipitor, ARB, Brilinta, ASA  Trend serial troponins  Low threshold for repeat EKG if recurrence of chest pain

## 2018-07-27 NOTE — DISCHARGE NOTE ADULT - MEDICATION SUMMARY - MEDICATIONS TO STOP TAKING
I will STOP taking the medications listed below when I get home from the hospital:     mg oral tablet  -- 1 tab(s) by mouth every 8 hours with food  -- Do not take this drug if you are pregnant.  It is very important that you take or use this exactly as directed.  Do not skip doses or discontinue unless directed by your doctor.  May cause drowsiness or dizziness.  Obtain medical advice before taking any non-prescription drugs as some may affect the action of this medication.  Take with food or milk.

## 2018-07-27 NOTE — PROGRESS NOTE ADULT - SUBJECTIVE AND OBJECTIVE BOX
[facility], [facility]                                   [address] [place] [address]                                                      PHONE: [phone]                                                         FAX: [phone]  [email or web address]  ---------------------------------------------------------------------------------------------------------------------------------    Overnight events/patient complaints: S/P PCI of RCA  No CP or SOB overnight,, patient presently in the MICU     PAST MEDICAL & SURGICAL HISTORY:  Stented coronary artery: x7, last stent placed in 2009  HLD (hyperlipidemia)  HTN (hypertension)  No significant past surgical history    No Known Allergies    MEDICATIONS  (STANDING):  aspirin  chewable 81 milliGRAM(s) Oral daily  atorvastatin 80 milliGRAM(s) Oral at bedtime  losartan 25 milliGRAM(s) Oral daily  nicotine -   7 mG/24Hr(s) Patch 1 Patch Transdermal daily  sodium chloride 0.9%. 500 milliLiter(s) (50 mL/Hr) IV Continuous <Continuous>  ticagrelor 90 milliGRAM(s) Oral two times a day    MEDICATIONS  (PRN):  acetaminophen   Tablet. 650 milliGRAM(s) Oral every 6 hours PRN Mild Pain (1 - 3)  aluminum hydroxide/magnesium hydroxide/simethicone Suspension 30 milliLiter(s) Oral every 6 hours PRN Dyspepsia  ondansetron Injectable 4 milliGRAM(s) IV Push every 8 hours PRN Nausea and/or Vomiting      Vital Signs Last 24 Hrs  T(C): 36.3 (27 Jul 2018 08:00), Max: 37.2 (26 Jul 2018 21:45)  T(F): 97.3 (27 Jul 2018 08:00), Max: 98.9 (26 Jul 2018 21:45)  HR: 62 (27 Jul 2018 10:00) (52 - 65)  BP: 100/57 (27 Jul 2018 10:00) (100/57 - 194/91)  BP(mean): 79 (27 Jul 2018 09:00) (77 - 130)  RR: 17 (27 Jul 2018 08:00) (16 - 26)  SpO2: 98% (27 Jul 2018 10:00) (95% - 100%)  ICU Vital Signs Last 24 Hrs  KIRSTEN KAUFMAN  I&O's Detail    26 Jul 2018 07:01  -  27 Jul 2018 07:00  --------------------------------------------------------  IN:    Oral Fluid: 340 mL    sodium chloride 0.9%.: 500 mL  Total IN: 840 mL    OUT:    Voided: 960 mL  Total OUT: 960 mL    Total NET: -120 mL      27 Jul 2018 07:01  -  27 Jul 2018 10:36  --------------------------------------------------------  IN:    Oral Fluid: 240 mL    sodium chloride 0.9%.: 50 mL  Total IN: 290 mL    OUT:  Total OUT: 0 mL    Total NET: 290 mL        I&O's Summary    26 Jul 2018 07:01  -  27 Jul 2018 07:00  --------------------------------------------------------  IN: 840 mL / OUT: 960 mL / NET: -120 mL    27 Jul 2018 07:01  -  27 Jul 2018 10:36  --------------------------------------------------------  IN: 290 mL / OUT: 0 mL / NET: 290 mL      Drug Dosing Weight  KIRSTEN KAUFMAN    REVIEW OF SYSTEMS:    Constitutional: No fever, weight loss or fatigue  Eyes: No eye pain, visual disturbances, or discharge  ENMT:  No difficulty hearing, tinnitus, vertigo; No sinus or throat pain  Neck: No pain or stiffness  Respiratory: No cough, wheezing, chills or hemoptysis  Cardiovascular: No chest pain, palpitations, shortness of breath, dizziness or leg swelling  Gastrointestinal: No abdominal or epigastric pain. No nausea, vomiting or hematemesis; No diarrhea or constipation. No melena or hematochezia.  Genitourinary: No dysuria, frequency, hematuria or incontinence  Rectal: No pain, hemorrhoids or incontinence  Neurological: No headaches, memory loss, loss of strength, numbness or tremors  Skin: No itching, burning, rashes or lesions   Lymph Nodes: No enlarged glands  Endocrine: No heat or cold intolerance; No hair loss  Musculoskeletal: No joint pain or swelling; No muscle, back or extremity pain  Psychiatric: No depression, anxiety, mood swings or difficulty sleeping  Heme/Lymph: No easy bruising or bleeding gums  Allergy and Immunologic: No hives or eczema    PHYSICAL EXAM:  General: Appears well developed, well nourished alert and cooperative.  HEENT: Head; normocephalic, atraumatic.  Eyes: Pupils reactive, cornea wnl.  Neck: Supple, no nodes adenopathy, no NVD or carotid bruit or thyromegaly.  CARDIOVASCULAR: Normal S1 and S2, No murmur, rub, gallop or lift.   LUNGS: No rales, rhonchi or wheeze. Normal breath sounds bilaterally.  ABDOMEN: Soft, nontender without mass or organomegaly. bowel sounds normoactive.  EXTREMITIES: No clubbing, cyanosis or edema. Distal pulses wnl.   SKIN: warm and dry with normal turgor.  NEURO: Alert/oriented x 3/normal motor exam. No pathologic reflexes.    PSYCH: normal affect.        LABS:                        14.0   10.4  )-----------( 198      ( 27 Jul 2018 04:28 )             42.9     07-27    138  |  104  |  15.0  ----------------------------<  119<H>  4.0   |  24.0  |  0.65    Ca    9.2      27 Jul 2018 04:28  Mg     2.0     07-27    TPro  6.3<L>  /  Alb  3.4  /  TBili  0.5  /  DBili  x   /  AST  149<H>  /  ALT  37  /  AlkPhos  75  07-27    KIRSTEN KAUFMAN  CARDIAC MARKERS ( 27 Jul 2018 04:28 )  x     / 5.82 ng/mL / x     / x     / x      CARDIAC MARKERS ( 26 Jul 2018 09:58 )  x     / 0.05 ng/mL / x     / x     / x                RADIOLOGY & ADDITIONAL STUDIES:    INTERPRETATION OF TELEMETRY (personally reviewed):    ECG:    ECHO:    STRESS TEST:    CARDIAC CATHETERIZATION:    ACTIVE PROBLEMS:  HEALTH ISSUES - PROBLEM Dx:  Coronary artery disease: Coronary artery disease  HLD (hyperlipidemia): HLD (hyperlipidemia)  Essential hypertension: Essential hypertension  Stented coronary artery: Stented coronary artery  Unstable angina: Unstable angina

## 2018-07-27 NOTE — CONSULT NOTE ADULT - ASSESSMENT
66 y/o male w/ PMHx of HLD, HTN, angina and previous cardiac stents admitted to ICU s/p stenting of RCA w/ multiple episodes of sinus pauses, displaying possible inessa dysfunction. 68 y/o male w/ PMHx of HLD, HTN, angina and previous cardiac stents admitted to ICU with acute evolving myocardial infarction s/p cardiac catheterization and stenting of RCA complicated by multiple episodes of sinus pauses

## 2018-07-27 NOTE — PROGRESS NOTE ADULT - SUBJECTIVE AND OBJECTIVE BOX
Patient is a 67y old  Male who presents with a chief complaint of chest pain (26 Jul 2018 11:56)      BRIEF HOSPITAL COURSE:  66 yo male, PMHx CAD with prior PCI (7 stents), HTN, HLD, who presented to ED c/o mid-inferior chest pain, non-radiating, described as "burning" associated with nausea that onset after waking at 05:00 to get ready for work. Pt was found to have inferior wall T-wave inversions, and was taken to the cath lab by Dr. Cheikh Sharma. During the procedure, 2 stents were placed in RCA, however pt noted to have multiple 3 second sinus pauses on his ECG tracing during procedure. No further episodes overnight     Events last 24 hours: feeling well this am, no acute issues    PAST MEDICAL & SURGICAL HISTORY:  Stented coronary artery: x7, last stent placed in 2009  HLD (hyperlipidemia)  HTN (hypertension)  No significant past surgical history      Review of Systems:  CONSTITUTIONAL: No fever, chills, or fatigue  EYES: No eye pain, visual disturbances, or discharge  ENMT:  No difficulty hearing, tinnitus, vertigo; No sinus or throat pain  NECK: No pain or stiffness  RESPIRATORY: No cough, wheezing, chills or hemoptysis; No shortness of breath  CARDIOVASCULAR: No chest pain, palpitations, dizziness, or leg swelling  GASTROINTESTINAL: No abdominal or epigastric pain. No nausea, vomiting, or hematemesis; No diarrhea or constipation. No melena or hematochezia.  GENITOURINARY: No dysuria, frequency, hematuria, or incontinence  NEUROLOGICAL: No headaches, memory loss, loss of strength, numbness, or tremors  SKIN: No itching, burning, rashes, or lesions   MUSCULOSKELETAL: No joint pain or swelling; No muscle, back, or extremity pain  PSYCHIATRIC: No depression, anxiety, mood swings, or difficulty sleeping      Medications:    losartan 25 milliGRAM(s) Oral daily      acetaminophen   Tablet. 650 milliGRAM(s) Oral every 6 hours PRN  ondansetron Injectable 4 milliGRAM(s) IV Push every 8 hours PRN      aspirin  chewable 81 milliGRAM(s) Oral daily  ticagrelor 90 milliGRAM(s) Oral two times a day    aluminum hydroxide/magnesium hydroxide/simethicone Suspension 30 milliLiter(s) Oral every 6 hours PRN      atorvastatin 80 milliGRAM(s) Oral at bedtime          nicotine -   7 mG/24Hr(s) Patch 1 Patch Transdermal daily          ICU Vital Signs Last 24 Hrs  T(C): 36.3 (27 Jul 2018 08:00), Max: 37.2 (26 Jul 2018 21:45)  T(F): 97.3 (27 Jul 2018 08:00), Max: 98.9 (26 Jul 2018 21:45)  HR: 62 (27 Jul 2018 10:00) (52 - 65)  BP: 100/57 (27 Jul 2018 10:00) (100/57 - 194/91)  BP(mean): 79 (27 Jul 2018 09:00) (77 - 130)  ABP: --  ABP(mean): --  RR: 17 (27 Jul 2018 08:00) (16 - 26)  SpO2: 98% (27 Jul 2018 10:00) (95% - 100%)          I&O's Detail    26 Jul 2018 07:01  -  27 Jul 2018 07:00  --------------------------------------------------------  IN:    Oral Fluid: 340 mL    sodium chloride 0.9%: 500 mL  Total IN: 840 mL    OUT:    Voided: 960 mL  Total OUT: 960 mL    Total NET: -120 mL      27 Jul 2018 07:01  -  27 Jul 2018 10:55  --------------------------------------------------------  IN:    Oral Fluid: 240 mL    sodium chloride 0.9%: 50 mL  Total IN: 290 mL    OUT:  Total OUT: 0 mL    Total NET: 290 mL            LABS:                        14.0   10.4  )-----------( 198      ( 27 Jul 2018 04:28 )             42.9     07-27    138  |  104  |  15.0  ----------------------------<  119<H>  4.0   |  24.0  |  0.65    Ca    9.2      27 Jul 2018 04:28  Mg     2.0     07-27    TPro  6.3<L>  /  Alb  3.4  /  TBili  0.5  /  DBili  x   /  AST  149<H>  /  ALT  37  /  AlkPhos  75  07-27      CARDIAC MARKERS ( 27 Jul 2018 04:28 )  x     / 5.82 ng/mL / x     / x     / x      CARDIAC MARKERS ( 26 Jul 2018 09:58 )  x     / 0.05 ng/mL / x     / x     / x          CAPILLARY BLOOD GLUCOSE            CULTURES:      Physical Examination:    General: No acute distress.  Alert, oriented, interactive, nonfocal    HEENT: Pupils equal, reactive to light.  Symmetric.    PULM: Clear to auscultation bilaterally, no significant sputum production    CVS: Regular rate and rhythm, no murmurs, rubs, or gallops    ABD: Soft, nondistended, nontender, normoactive bowel sounds, no masses    EXT: No edema, nontender    SKIN: Warm and well perfused, no rashes noted.

## 2018-07-27 NOTE — DISCHARGE NOTE ADULT - MEDICATION SUMMARY - MEDICATIONS TO TAKE
I will START or STAY ON the medications listed below when I get home from the hospital:    aspirin 81 mg oral tablet, chewable  -- 1 tab(s) by mouth once a day  -- Indication: For Cad    losartan 25 mg oral tablet  -- 1 tab(s) by mouth once a day  -- Indication: For Cad    atorvastatin 80 mg oral tablet  -- 1 tab(s) by mouth once a day (at bedtime)   -- Indication: For Cad    ticagrelor 90 mg oral tablet  -- 1 tab(s) by mouth 2 times a day  -- Indication: For Cad    nicotine 7 mg/24 hr transdermal film, extended release  -- 1 patch by transdermal patch once a day  -- Indication: For Smoker I will START or STAY ON the medications listed below when I get home from the hospital:    aspirin 81 mg oral tablet, chewable  -- 1 tab(s) by mouth once a day  -- Indication: For Cad    losartan 25 mg oral tablet  -- 1 tab(s) by mouth once a day  -- Indication: For Cad    atorvastatin 80 mg oral tablet  -- 1 tab(s) by mouth once a day (at bedtime)   -- Indication: For Cad    Plavix 75 mg oral tablet  -- 1 tab(s) by mouth once a day   -- Do not take aspirin or aspirin containing products without knowledge and consent of your physician.    -- Indication: For Cad    nicotine 7 mg/24 hr transdermal film, extended release  -- 1 patch by transdermal patch once a day  -- Indication: For Smoker

## 2018-07-27 NOTE — DISCHARGE NOTE ADULT - PLAN OF CARE
s/p stent dc with asa and brillinta  follow up with cardio plan as per #1 statin patch advised to quit NO BETA BLOCKER follow up with cardio dc with asa and plavix  follow up with cardio

## 2018-07-27 NOTE — DISCHARGE NOTE ADULT - PATIENT PORTAL LINK FT
You can access the [facility] Portal, offered by [facility], by registering with the following website: [email or web address]

## 2018-07-28 VITALS
RESPIRATION RATE: 18 BRPM | TEMPERATURE: 98 F | HEART RATE: 68 BPM | DIASTOLIC BLOOD PRESSURE: 60 MMHG | OXYGEN SATURATION: 98 % | SYSTOLIC BLOOD PRESSURE: 120 MMHG

## 2018-07-28 LAB
ANION GAP SERPL CALC-SCNC: 11 MMOL/L — SIGNIFICANT CHANGE UP (ref 5–17)
BUN SERPL-MCNC: 16 MG/DL — SIGNIFICANT CHANGE UP (ref 8–20)
CALCIUM SERPL-MCNC: 9.2 MG/DL — SIGNIFICANT CHANGE UP (ref 8.6–10.2)
CHLORIDE SERPL-SCNC: 103 MMOL/L — SIGNIFICANT CHANGE UP (ref 98–107)
CO2 SERPL-SCNC: 25 MMOL/L — SIGNIFICANT CHANGE UP (ref 22–29)
CREAT SERPL-MCNC: 0.74 MG/DL — SIGNIFICANT CHANGE UP (ref 0.5–1.3)
GLUCOSE SERPL-MCNC: 95 MG/DL — SIGNIFICANT CHANGE UP (ref 70–115)
HCT VFR BLD CALC: 42.6 % — SIGNIFICANT CHANGE UP (ref 42–52)
HGB BLD-MCNC: 13.9 G/DL — LOW (ref 14–18)
MAGNESIUM SERPL-MCNC: 2.2 MG/DL — SIGNIFICANT CHANGE UP (ref 1.6–2.6)
MCHC RBC-ENTMCNC: 28.8 PG — SIGNIFICANT CHANGE UP (ref 27–31)
MCHC RBC-ENTMCNC: 32.6 G/DL — SIGNIFICANT CHANGE UP (ref 32–36)
MCV RBC AUTO: 88.2 FL — SIGNIFICANT CHANGE UP (ref 80–94)
PHOSPHATE SERPL-MCNC: 2.5 MG/DL — SIGNIFICANT CHANGE UP (ref 2.4–4.7)
PLATELET # BLD AUTO: 185 K/UL — SIGNIFICANT CHANGE UP (ref 150–400)
POTASSIUM SERPL-MCNC: 4.1 MMOL/L — SIGNIFICANT CHANGE UP (ref 3.5–5.3)
POTASSIUM SERPL-SCNC: 4.1 MMOL/L — SIGNIFICANT CHANGE UP (ref 3.5–5.3)
RBC # BLD: 4.83 M/UL — SIGNIFICANT CHANGE UP (ref 4.6–6.2)
RBC # FLD: 14.3 % — SIGNIFICANT CHANGE UP (ref 11–15.6)
SODIUM SERPL-SCNC: 139 MMOL/L — SIGNIFICANT CHANGE UP (ref 135–145)
WBC # BLD: 7.4 K/UL — SIGNIFICANT CHANGE UP (ref 4.8–10.8)
WBC # FLD AUTO: 7.4 K/UL — SIGNIFICANT CHANGE UP (ref 4.8–10.8)

## 2018-07-28 PROCEDURE — 85027 COMPLETE CBC AUTOMATED: CPT

## 2018-07-28 PROCEDURE — 99152 MOD SED SAME PHYS/QHP 5/>YRS: CPT

## 2018-07-28 PROCEDURE — 99153 MOD SED SAME PHYS/QHP EA: CPT

## 2018-07-28 PROCEDURE — 84484 ASSAY OF TROPONIN QUANT: CPT

## 2018-07-28 PROCEDURE — 86900 BLOOD TYPING SEROLOGIC ABO: CPT

## 2018-07-28 PROCEDURE — 93010 ELECTROCARDIOGRAM REPORT: CPT

## 2018-07-28 PROCEDURE — 83735 ASSAY OF MAGNESIUM: CPT

## 2018-07-28 PROCEDURE — C1874: CPT

## 2018-07-28 PROCEDURE — 84480 ASSAY TRIIODOTHYRONINE (T3): CPT

## 2018-07-28 PROCEDURE — 83036 HEMOGLOBIN GLYCOSYLATED A1C: CPT

## 2018-07-28 PROCEDURE — 93005 ELECTROCARDIOGRAM TRACING: CPT

## 2018-07-28 PROCEDURE — C1894: CPT

## 2018-07-28 PROCEDURE — 86850 RBC ANTIBODY SCREEN: CPT

## 2018-07-28 PROCEDURE — 36415 COLL VENOUS BLD VENIPUNCTURE: CPT

## 2018-07-28 PROCEDURE — 86901 BLOOD TYPING SEROLOGIC RH(D): CPT

## 2018-07-28 PROCEDURE — 93458 L HRT ARTERY/VENTRICLE ANGIO: CPT | Mod: XU

## 2018-07-28 PROCEDURE — C1769: CPT

## 2018-07-28 PROCEDURE — C1887: CPT

## 2018-07-28 PROCEDURE — 84436 ASSAY OF TOTAL THYROXINE: CPT

## 2018-07-28 PROCEDURE — 80061 LIPID PANEL: CPT

## 2018-07-28 PROCEDURE — 84100 ASSAY OF PHOSPHORUS: CPT

## 2018-07-28 PROCEDURE — C1757: CPT

## 2018-07-28 PROCEDURE — 99239 HOSP IP/OBS DSCHRG MGMT >30: CPT

## 2018-07-28 PROCEDURE — 99285 EMERGENCY DEPT VISIT HI MDM: CPT | Mod: 25

## 2018-07-28 PROCEDURE — 84443 ASSAY THYROID STIM HORMONE: CPT

## 2018-07-28 PROCEDURE — 71045 X-RAY EXAM CHEST 1 VIEW: CPT

## 2018-07-28 PROCEDURE — 93306 TTE W/DOPPLER COMPLETE: CPT

## 2018-07-28 PROCEDURE — 80053 COMPREHEN METABOLIC PANEL: CPT

## 2018-07-28 PROCEDURE — 80048 BASIC METABOLIC PNL TOTAL CA: CPT

## 2018-07-28 PROCEDURE — C9606: CPT | Mod: RC

## 2018-07-28 RX ADMIN — CLOPIDOGREL BISULFATE 75 MILLIGRAM(S): 75 TABLET, FILM COATED ORAL at 11:58

## 2018-07-28 RX ADMIN — LOSARTAN POTASSIUM 25 MILLIGRAM(S): 100 TABLET, FILM COATED ORAL at 05:09

## 2018-07-28 RX ADMIN — Medication 81 MILLIGRAM(S): at 11:58

## 2018-07-28 NOTE — PROGRESS NOTE ADULT - ASSESSMENT
A-s/p [facility] RRA  thrombectomy for ISR  STEPHAN RICHY 4.5 x 30 mm mRCA  STEPHAN RICHY 3.0 x 22 mm dRCA   Smoker
1) cad s/p stents ---> c.w asa and brilinta  --> cotinue with arb and statin  --> cardio following    2) sinus pauses --> c.w tele  --> no beta blocker for now  --> plan as per cardio    3) smoker --> pacth  --> advised to quit    transfer to tele   --> rola penaloza tomorrow as per cardio
66 y/o male w/ PMHx of HLD, HTN, angina and previous cardiac stents admitted to ICU with acute evolving myocardial infarction s/p cardiac catheterization and stenting of RCA complicated by multiple episodes of sinus pauses    stabel for transfer to telemetry
DC home  Will FUP at [facility] in 2 weeks    Thanks    MD SOFIA, FACC[facility]
OK to transfer to tele  continue DAPT  likely DC in AM    Thanks    MD SOFIA, FACC, [facility]

## 2018-07-28 NOTE — PROGRESS NOTE ADULT - SUBJECTIVE AND OBJECTIVE BOX
[facility], THE [facility]ART [facility]ENTER                                   [address] [place] [address]                                                      PHONE: [phone]                                                         FAX: [phone]  [email or web address]  ---------------------------------------------------------------------------------------------------------------------------------    Overnight events/patient complaints: S/P PCI of RCA  No CP or SOB overnight    PAST MEDICAL & SURGICAL HISTORY:  Stented coronary artery: x7, last stent placed in 2009  HLD (hyperlipidemia)  HTN (hypertension)  No significant past surgical history    No Known Allergies    MEDICATIONS  (STANDING):  aspirin  chewable 81 milliGRAM(s) Oral daily  atorvastatin 80 milliGRAM(s) Oral at bedtime  losartan 25 milliGRAM(s) Oral daily  nicotine -   7 mG/24Hr(s) Patch 1 Patch Transdermal daily  sodium chloride 0.9%. 500 milliLiter(s) (50 mL/Hr) IV Continuous <Continuous>  ticagrelor 90 milliGRAM(s) Oral two times a day    MEDICATIONS  (PRN):  acetaminophen   Tablet. 650 milliGRAM(s) Oral every 6 hours PRN Mild Pain (1 - 3)  aluminum hydroxide/magnesium hydroxide/simethicone Suspension 30 milliLiter(s) Oral every 6 hours PRN Dyspepsia  ondansetron Injectable 4 milliGRAM(s) IV Push every 8 hours PRN Nausea and/or Vomiting      Vital Signs Last 24 Hrs  T(C): 36.3 (27 Jul 2018 08:00), Max: 37.2 (26 Jul 2018 21:45)  T(F): 97.3 (27 Jul 2018 08:00), Max: 98.9 (26 Jul 2018 21:45)  HR: 62 (27 Jul 2018 10:00) (52 - 65)  BP: 100/57 (27 Jul 2018 10:00) (100/57 - 194/91)  BP(mean): 79 (27 Jul 2018 09:00) (77 - 130)  RR: 17 (27 Jul 2018 08:00) (16 - 26)  SpO2: 98% (27 Jul 2018 10:00) (95% - 100%)  ICU Vital Signs Last 24 Hrs  KIRSTEN KAUFMAN  I&O's Detail    26 Jul 2018 07:01  -  27 Jul 2018 07:00  --------------------------------------------------------  IN:    Oral Fluid: 340 mL    sodium chloride 0.9%.: 500 mL  Total IN: 840 mL    OUT:    Voided: 960 mL  Total OUT: 960 mL    Total NET: -120 mL      27 Jul 2018 07:01  -  27 Jul 2018 10:36  --------------------------------------------------------  IN:    Oral Fluid: 240 mL    sodium chloride 0.9%.: 50 mL  Total IN: 290 mL    OUT:  Total OUT: 0 mL    Total NET: 290 mL        I&O's Summary    26 Jul 2018 07:01  -  27 Jul 2018 07:00  --------------------------------------------------------  IN: 840 mL / OUT: 960 mL / NET: -120 mL    27 Jul 2018 07:01  -  27 Jul 2018 10:36  --------------------------------------------------------  IN: 290 mL / OUT: 0 mL / NET: 290 mL      Drug Dosing Weight  KIRSTEN KAUFMAN    REVIEW OF SYSTEMS:    Constitutional: No fever, weight loss or fatigue  Eyes: No eye pain, visual disturbances, or discharge  ENMT:  No difficulty hearing, tinnitus, vertigo; No sinus or throat pain  Neck: No pain or stiffness  Respiratory: No cough, wheezing, chills or hemoptysis  Cardiovascular: No chest pain, palpitations, shortness of breath, dizziness or leg swelling  Gastrointestinal: No abdominal or epigastric pain. No nausea, vomiting or hematemesis; No diarrhea or constipation. No melena or hematochezia.  Genitourinary: No dysuria, frequency, hematuria or incontinence  Rectal: No pain, hemorrhoids or incontinence  Neurological: No headaches, memory loss, loss of strength, numbness or tremors  Skin: No itching, burning, rashes or lesions   Lymph Nodes: No enlarged glands  Endocrine: No heat or cold intolerance; No hair loss  Musculoskeletal: No joint pain or swelling; No muscle, back or extremity pain  Psychiatric: No depression, anxiety, mood swings or difficulty sleeping  Heme/Lymph: No easy bruising or bleeding gums  Allergy and Immunologic: No hives or eczema    PHYSICAL EXAM:  General: Appears well developed, well nourished alert and cooperative.  HEENT: Head; normocephalic, atraumatic.  Eyes: Pupils reactive, cornea wnl.  Neck: Supple, no nodes adenopathy, no NVD or carotid bruit or thyromegaly.  CARDIOVASCULAR: Normal S1 and S2, No murmur, rub, gallop or lift.   LUNGS: No rales, rhonchi or wheeze. Normal breath sounds bilaterally.  ABDOMEN: Soft, nontender without mass or organomegaly. bowel sounds normoactive.  EXTREMITIES: No clubbing, cyanosis or edema. Distal pulses wnl.   SKIN: warm and dry with normal turgor.  NEURO: Alert/oriented x 3/normal motor exam. No pathologic reflexes.    PSYCH: normal affect.        LABS:                        14.0   10.4  )-----------( 198      ( 27 Jul 2018 04:28 )             42.9     07-27    138  |  104  |  15.0  ----------------------------<  119<H>  4.0   |  24.0  |  0.65    Ca    9.2      27 Jul 2018 04:28  Mg     2.0     07-27    TPro  6.3<L>  /  Alb  3.4  /  TBili  0.5  /  DBili  x   /  AST  149<H>  /  ALT  37  /  AlkPhos  75  07-27    KIRSTEN KAUFMAN  CARDIAC MARKERS ( 27 Jul 2018 04:28 )  x     / 5.82 ng/mL / x     / x     / x      CARDIAC MARKERS ( 26 Jul 2018 09:58 )  x     / 0.05 ng/mL / x     / x     / x                RADIOLOGY & ADDITIONAL STUDIES:    INTERPRETATION OF TELEMETRY (personally reviewed):    ECG:    ECHO:    STRESS TEST:    CARDIAC CATHETERIZATION:    ACTIVE PROBLEMS:  HEALTH ISSUES - PROBLEM Dx:  Coronary artery disease: Coronary artery disease  HLD (hyperlipidemia): HLD (hyperlipidemia)  Essential hypertension: Essential hypertension  Stented coronary artery: Stented coronary artery  Unstable angina: Unstable angina

## 2019-01-14 NOTE — ED PROVIDER NOTE - RESPIRATORY NEGATIVE STATEMENT, MLM
Outpatient Medications Marked as Taking for the 1/14/19 encounter (Refill) with Manjula Bowling MD   Medication Sig Dispense Refill   â¢ blood glucose (ONE TOUCH ULTRA TEST) test strip Test blood sugars 2 times daily 50 strip 11        Refill fax received from [email or web address] no chest pain, no cough, and no shortness of breath.

## 2021-02-09 ENCOUNTER — INPATIENT (INPATIENT)
Facility: HOSPITAL | Age: 71
LOS: 7 days | Discharge: HOME HEALTH | DRG: 234 | End: 2021-02-17
Attending: THORACIC SURGERY (CARDIOTHORACIC VASCULAR SURGERY) | Admitting: HOSPITALIST
Payer: MEDICAID

## 2021-02-09 PROCEDURE — 99285 EMERGENCY DEPT VISIT HI MDM: CPT

## 2021-02-10 VITALS
HEART RATE: 90 BPM | OXYGEN SATURATION: 98 % | DIASTOLIC BLOOD PRESSURE: 85 MMHG | TEMPERATURE: 98 F | RESPIRATION RATE: 18 BRPM | HEIGHT: 72 IN | SYSTOLIC BLOOD PRESSURE: 175 MMHG | WEIGHT: 240.08 LBS

## 2021-02-10 DIAGNOSIS — I25.10 ATHEROSCLEROTIC HEART DISEASE OF NATIVE CORONARY ARTERY WITHOUT ANGINA PECTORIS: ICD-10-CM

## 2021-02-10 DIAGNOSIS — Z95.5 PRESENCE OF CORONARY ANGIOPLASTY IMPLANT AND GRAFT: Chronic | ICD-10-CM

## 2021-02-10 DIAGNOSIS — I24.9 ACUTE ISCHEMIC HEART DISEASE, UNSPECIFIED: ICD-10-CM

## 2021-02-10 DIAGNOSIS — I10 ESSENTIAL (PRIMARY) HYPERTENSION: ICD-10-CM

## 2021-02-10 DIAGNOSIS — E78.00 PURE HYPERCHOLESTEROLEMIA, UNSPECIFIED: ICD-10-CM

## 2021-02-10 PROBLEM — E78.5 HYPERLIPIDEMIA, UNSPECIFIED: Chronic | Status: ACTIVE | Noted: 2018-07-26

## 2021-02-10 LAB
A1C WITH ESTIMATED AVERAGE GLUCOSE RESULT: 5.5 % — SIGNIFICANT CHANGE UP (ref 4–5.6)
ALBUMIN SERPL ELPH-MCNC: 3.8 G/DL — SIGNIFICANT CHANGE UP (ref 3.3–5.2)
ALBUMIN SERPL ELPH-MCNC: 4 G/DL — SIGNIFICANT CHANGE UP (ref 3.3–5.2)
ALP SERPL-CCNC: 106 U/L — SIGNIFICANT CHANGE UP (ref 40–120)
ALP SERPL-CCNC: 112 U/L — SIGNIFICANT CHANGE UP (ref 40–120)
ALT FLD-CCNC: 22 U/L — SIGNIFICANT CHANGE UP
ALT FLD-CCNC: 25 U/L — SIGNIFICANT CHANGE UP
ANION GAP SERPL CALC-SCNC: 10 MMOL/L — SIGNIFICANT CHANGE UP (ref 5–17)
ANION GAP SERPL CALC-SCNC: 13 MMOL/L — SIGNIFICANT CHANGE UP (ref 5–17)
APTT BLD: 30.1 SEC — SIGNIFICANT CHANGE UP (ref 27.5–35.5)
APTT BLD: 30.5 SEC — SIGNIFICANT CHANGE UP (ref 27.5–35.5)
AST SERPL-CCNC: 22 U/L — SIGNIFICANT CHANGE UP
AST SERPL-CCNC: 28 U/L — SIGNIFICANT CHANGE UP
BASOPHILS # BLD AUTO: 0.06 K/UL — SIGNIFICANT CHANGE UP (ref 0–0.2)
BASOPHILS # BLD AUTO: 0.07 K/UL — SIGNIFICANT CHANGE UP (ref 0–0.2)
BASOPHILS NFR BLD AUTO: 0.6 % — SIGNIFICANT CHANGE UP (ref 0–2)
BASOPHILS NFR BLD AUTO: 0.7 % — SIGNIFICANT CHANGE UP (ref 0–2)
BILIRUB SERPL-MCNC: 0.2 MG/DL — LOW (ref 0.4–2)
BILIRUB SERPL-MCNC: 0.3 MG/DL — LOW (ref 0.4–2)
BUN SERPL-MCNC: 21 MG/DL — HIGH (ref 8–20)
BUN SERPL-MCNC: 21 MG/DL — HIGH (ref 8–20)
CALCIUM SERPL-MCNC: 9.6 MG/DL — SIGNIFICANT CHANGE UP (ref 8.6–10.2)
CALCIUM SERPL-MCNC: 9.7 MG/DL — SIGNIFICANT CHANGE UP (ref 8.6–10.2)
CHLORIDE SERPL-SCNC: 102 MMOL/L — SIGNIFICANT CHANGE UP (ref 98–107)
CHLORIDE SERPL-SCNC: 103 MMOL/L — SIGNIFICANT CHANGE UP (ref 98–107)
CHOLEST SERPL-MCNC: 134 MG/DL — SIGNIFICANT CHANGE UP
CK SERPL-CCNC: 123 U/L — SIGNIFICANT CHANGE UP (ref 30–200)
CK SERPL-CCNC: 70 U/L — SIGNIFICANT CHANGE UP (ref 30–200)
CO2 SERPL-SCNC: 21 MMOL/L — LOW (ref 22–29)
CO2 SERPL-SCNC: 25 MMOL/L — SIGNIFICANT CHANGE UP (ref 22–29)
CREAT SERPL-MCNC: 0.72 MG/DL — SIGNIFICANT CHANGE UP (ref 0.5–1.3)
CREAT SERPL-MCNC: 0.87 MG/DL — SIGNIFICANT CHANGE UP (ref 0.5–1.3)
EOSINOPHIL # BLD AUTO: 0.19 K/UL — SIGNIFICANT CHANGE UP (ref 0–0.5)
EOSINOPHIL # BLD AUTO: 0.21 K/UL — SIGNIFICANT CHANGE UP (ref 0–0.5)
EOSINOPHIL NFR BLD AUTO: 2 % — SIGNIFICANT CHANGE UP (ref 0–6)
EOSINOPHIL NFR BLD AUTO: 2.1 % — SIGNIFICANT CHANGE UP (ref 0–6)
ESTIMATED AVERAGE GLUCOSE: 111 MG/DL — SIGNIFICANT CHANGE UP (ref 68–114)
GLUCOSE SERPL-MCNC: 116 MG/DL — HIGH (ref 70–99)
GLUCOSE SERPL-MCNC: 118 MG/DL — HIGH (ref 70–99)
HCT VFR BLD CALC: 45 % — SIGNIFICANT CHANGE UP (ref 39–50)
HCT VFR BLD CALC: 47.4 % — SIGNIFICANT CHANGE UP (ref 39–50)
HDLC SERPL-MCNC: 39 MG/DL — LOW
HGB BLD-MCNC: 14.7 G/DL — SIGNIFICANT CHANGE UP (ref 13–17)
HGB BLD-MCNC: 15.6 G/DL — SIGNIFICANT CHANGE UP (ref 13–17)
IMM GRANULOCYTES NFR BLD AUTO: 0.3 % — SIGNIFICANT CHANGE UP (ref 0–1.5)
IMM GRANULOCYTES NFR BLD AUTO: 0.4 % — SIGNIFICANT CHANGE UP (ref 0–1.5)
INR BLD: 1.05 RATIO — SIGNIFICANT CHANGE UP (ref 0.88–1.16)
INR BLD: 1.07 RATIO — SIGNIFICANT CHANGE UP (ref 0.88–1.16)
LIDOCAIN IGE QN: 18 U/L — LOW (ref 22–51)
LIPID PNL WITH DIRECT LDL SERPL: 80 MG/DL — SIGNIFICANT CHANGE UP
LYMPHOCYTES # BLD AUTO: 1.48 K/UL — SIGNIFICANT CHANGE UP (ref 1–3.3)
LYMPHOCYTES # BLD AUTO: 1.53 K/UL — SIGNIFICANT CHANGE UP (ref 1–3.3)
LYMPHOCYTES # BLD AUTO: 15.5 % — SIGNIFICANT CHANGE UP (ref 13–44)
LYMPHOCYTES # BLD AUTO: 15.8 % — SIGNIFICANT CHANGE UP (ref 13–44)
MAGNESIUM SERPL-MCNC: 2.1 MG/DL — SIGNIFICANT CHANGE UP (ref 1.6–2.6)
MCHC RBC-ENTMCNC: 28.8 PG — SIGNIFICANT CHANGE UP (ref 27–34)
MCHC RBC-ENTMCNC: 29.1 PG — SIGNIFICANT CHANGE UP (ref 27–34)
MCHC RBC-ENTMCNC: 32.7 GM/DL — SIGNIFICANT CHANGE UP (ref 32–36)
MCHC RBC-ENTMCNC: 32.9 GM/DL — SIGNIFICANT CHANGE UP (ref 32–36)
MCV RBC AUTO: 88.2 FL — SIGNIFICANT CHANGE UP (ref 80–100)
MCV RBC AUTO: 88.3 FL — SIGNIFICANT CHANGE UP (ref 80–100)
MONOCYTES # BLD AUTO: 0.75 K/UL — SIGNIFICANT CHANGE UP (ref 0–0.9)
MONOCYTES # BLD AUTO: 0.98 K/UL — HIGH (ref 0–0.9)
MONOCYTES NFR BLD AUTO: 8 % — SIGNIFICANT CHANGE UP (ref 2–14)
MONOCYTES NFR BLD AUTO: 9.9 % — SIGNIFICANT CHANGE UP (ref 2–14)
NEUTROPHILS # BLD AUTO: 6.87 K/UL — SIGNIFICANT CHANGE UP (ref 1.8–7.4)
NEUTROPHILS # BLD AUTO: 7.03 K/UL — SIGNIFICANT CHANGE UP (ref 1.8–7.4)
NEUTROPHILS NFR BLD AUTO: 71.5 % — SIGNIFICANT CHANGE UP (ref 43–77)
NEUTROPHILS NFR BLD AUTO: 73.2 % — SIGNIFICANT CHANGE UP (ref 43–77)
NON HDL CHOLESTEROL: 95 MG/DL — SIGNIFICANT CHANGE UP
NT-PROBNP SERPL-SCNC: 202 PG/ML — SIGNIFICANT CHANGE UP (ref 0–300)
PHOSPHATE SERPL-MCNC: 3.2 MG/DL — SIGNIFICANT CHANGE UP (ref 2.4–4.7)
PLATELET # BLD AUTO: 203 K/UL — SIGNIFICANT CHANGE UP (ref 150–400)
PLATELET # BLD AUTO: 209 K/UL — SIGNIFICANT CHANGE UP (ref 150–400)
POTASSIUM SERPL-MCNC: 4 MMOL/L — SIGNIFICANT CHANGE UP (ref 3.5–5.3)
POTASSIUM SERPL-MCNC: 4.2 MMOL/L — SIGNIFICANT CHANGE UP (ref 3.5–5.3)
POTASSIUM SERPL-SCNC: 4 MMOL/L — SIGNIFICANT CHANGE UP (ref 3.5–5.3)
POTASSIUM SERPL-SCNC: 4.2 MMOL/L — SIGNIFICANT CHANGE UP (ref 3.5–5.3)
PROT SERPL-MCNC: 6.9 G/DL — SIGNIFICANT CHANGE UP (ref 6.6–8.7)
PROT SERPL-MCNC: 7.5 G/DL — SIGNIFICANT CHANGE UP (ref 6.6–8.7)
PROTHROM AB SERPL-ACNC: 12.2 SEC — SIGNIFICANT CHANGE UP (ref 10.6–13.6)
PROTHROM AB SERPL-ACNC: 12.4 SEC — SIGNIFICANT CHANGE UP (ref 10.6–13.6)
RAPID RVP RESULT: SIGNIFICANT CHANGE UP
RBC # BLD: 5.1 M/UL — SIGNIFICANT CHANGE UP (ref 4.2–5.8)
RBC # BLD: 5.37 M/UL — SIGNIFICANT CHANGE UP (ref 4.2–5.8)
RBC # FLD: 13.5 % — SIGNIFICANT CHANGE UP (ref 10.3–14.5)
RBC # FLD: 13.7 % — SIGNIFICANT CHANGE UP (ref 10.3–14.5)
SARS-COV-2 IGG SERPL QL IA: NEGATIVE — SIGNIFICANT CHANGE UP
SARS-COV-2 IGM SERPL IA-ACNC: 0.06 INDEX — SIGNIFICANT CHANGE UP
SARS-COV-2 RNA SPEC QL NAA+PROBE: SIGNIFICANT CHANGE UP
SODIUM SERPL-SCNC: 137 MMOL/L — SIGNIFICANT CHANGE UP (ref 135–145)
SODIUM SERPL-SCNC: 137 MMOL/L — SIGNIFICANT CHANGE UP (ref 135–145)
TRIGL SERPL-MCNC: 75 MG/DL — SIGNIFICANT CHANGE UP
TROPONIN T SERPL-MCNC: 0.02 NG/ML — SIGNIFICANT CHANGE UP (ref 0–0.06)
TROPONIN T SERPL-MCNC: 0.04 NG/ML — SIGNIFICANT CHANGE UP (ref 0–0.06)
TROPONIN T SERPL-MCNC: 0.11 NG/ML — HIGH (ref 0–0.06)
TSH SERPL-MCNC: <0.1 UIU/ML — LOW (ref 0.27–4.2)
WBC # BLD: 9.39 K/UL — SIGNIFICANT CHANGE UP (ref 3.8–10.5)
WBC # BLD: 9.85 K/UL — SIGNIFICANT CHANGE UP (ref 3.8–10.5)
WBC # FLD AUTO: 9.39 K/UL — SIGNIFICANT CHANGE UP (ref 3.8–10.5)
WBC # FLD AUTO: 9.85 K/UL — SIGNIFICANT CHANGE UP (ref 3.8–10.5)

## 2021-02-10 PROCEDURE — 93306 TTE W/DOPPLER COMPLETE: CPT | Mod: 26

## 2021-02-10 PROCEDURE — 93880 EXTRACRANIAL BILAT STUDY: CPT | Mod: 26

## 2021-02-10 PROCEDURE — 99223 1ST HOSP IP/OBS HIGH 75: CPT

## 2021-02-10 PROCEDURE — 12345: CPT | Mod: NC

## 2021-02-10 PROCEDURE — 93010 ELECTROCARDIOGRAM REPORT: CPT | Mod: 76

## 2021-02-10 PROCEDURE — 71045 X-RAY EXAM CHEST 1 VIEW: CPT | Mod: 26

## 2021-02-10 RX ORDER — CHLORHEXIDINE GLUCONATE 213 G/1000ML
15 SOLUTION TOPICAL
Refills: 0 | Status: DISCONTINUED | OUTPATIENT
Start: 2021-02-10 | End: 2021-02-12

## 2021-02-10 RX ORDER — ACETAMINOPHEN 500 MG
650 TABLET ORAL ONCE
Refills: 0 | Status: COMPLETED | OUTPATIENT
Start: 2021-02-10 | End: 2021-02-10

## 2021-02-10 RX ORDER — INFLUENZA VIRUS VACCINE 15; 15; 15; 15 UG/.5ML; UG/.5ML; UG/.5ML; UG/.5ML
0.5 SUSPENSION INTRAMUSCULAR ONCE
Refills: 0 | Status: COMPLETED | OUTPATIENT
Start: 2021-02-10 | End: 2021-02-10

## 2021-02-10 RX ORDER — ATORVASTATIN CALCIUM 80 MG/1
80 TABLET, FILM COATED ORAL AT BEDTIME
Refills: 0 | Status: DISCONTINUED | OUTPATIENT
Start: 2021-02-10 | End: 2021-02-12

## 2021-02-10 RX ORDER — NITROGLYCERIN 6.5 MG
0.4 CAPSULE, EXTENDED RELEASE ORAL
Refills: 0 | Status: COMPLETED | OUTPATIENT
Start: 2021-02-10 | End: 2021-02-12

## 2021-02-10 RX ORDER — LOSARTAN POTASSIUM 100 MG/1
50 TABLET, FILM COATED ORAL DAILY
Refills: 0 | Status: DISCONTINUED | OUTPATIENT
Start: 2021-02-10 | End: 2021-02-12

## 2021-02-10 RX ORDER — METOPROLOL TARTRATE 50 MG
50 TABLET ORAL DAILY
Refills: 0 | Status: DISCONTINUED | OUTPATIENT
Start: 2021-02-10 | End: 2021-02-12

## 2021-02-10 RX ORDER — LOSARTAN POTASSIUM 100 MG/1
25 TABLET, FILM COATED ORAL DAILY
Refills: 0 | Status: DISCONTINUED | OUTPATIENT
Start: 2021-02-10 | End: 2021-02-10

## 2021-02-10 RX ORDER — ONDANSETRON 8 MG/1
4 TABLET, FILM COATED ORAL EVERY 6 HOURS
Refills: 0 | Status: DISCONTINUED | OUTPATIENT
Start: 2021-02-10 | End: 2021-02-12

## 2021-02-10 RX ORDER — HEPARIN SODIUM 5000 [USP'U]/ML
6000 INJECTION INTRAVENOUS; SUBCUTANEOUS EVERY 6 HOURS
Refills: 0 | Status: DISCONTINUED | OUTPATIENT
Start: 2021-02-10 | End: 2021-02-12

## 2021-02-10 RX ORDER — ASPIRIN/CALCIUM CARB/MAGNESIUM 324 MG
81 TABLET ORAL DAILY
Refills: 0 | Status: DISCONTINUED | OUTPATIENT
Start: 2021-02-10 | End: 2021-02-12

## 2021-02-10 RX ORDER — MUPIROCIN 20 MG/G
1 OINTMENT TOPICAL
Refills: 0 | Status: DISCONTINUED | OUTPATIENT
Start: 2021-02-10 | End: 2021-02-12

## 2021-02-10 RX ORDER — ENOXAPARIN SODIUM 100 MG/ML
40 INJECTION SUBCUTANEOUS DAILY
Refills: 0 | Status: DISCONTINUED | OUTPATIENT
Start: 2021-02-10 | End: 2021-02-10

## 2021-02-10 RX ORDER — NITROGLYCERIN 6.5 MG
0.4 CAPSULE, EXTENDED RELEASE ORAL ONCE
Refills: 0 | Status: COMPLETED | OUTPATIENT
Start: 2021-02-10 | End: 2021-02-10

## 2021-02-10 RX ORDER — FAMOTIDINE 10 MG/ML
20 INJECTION INTRAVENOUS ONCE
Refills: 0 | Status: DISCONTINUED | OUTPATIENT
Start: 2021-02-10 | End: 2021-02-10

## 2021-02-10 RX ORDER — HEPARIN SODIUM 5000 [USP'U]/ML
INJECTION INTRAVENOUS; SUBCUTANEOUS
Qty: 25000 | Refills: 0 | Status: DISCONTINUED | OUTPATIENT
Start: 2021-02-10 | End: 2021-02-12

## 2021-02-10 RX ADMIN — ATORVASTATIN CALCIUM 80 MILLIGRAM(S): 80 TABLET, FILM COATED ORAL at 22:35

## 2021-02-10 RX ADMIN — Medication 50 MILLIGRAM(S): at 19:44

## 2021-02-10 RX ADMIN — Medication 81 MILLIGRAM(S): at 12:08

## 2021-02-10 RX ADMIN — HEPARIN SODIUM 6000 UNIT(S): 5000 INJECTION INTRAVENOUS; SUBCUTANEOUS at 22:53

## 2021-02-10 RX ADMIN — ENOXAPARIN SODIUM 40 MILLIGRAM(S): 100 INJECTION SUBCUTANEOUS at 12:07

## 2021-02-10 RX ADMIN — Medication 0.4 MILLIGRAM(S): at 00:43

## 2021-02-10 RX ADMIN — HEPARIN SODIUM 1000 UNIT(S)/HR: 5000 INJECTION INTRAVENOUS; SUBCUTANEOUS at 22:46

## 2021-02-10 RX ADMIN — Medication 650 MILLIGRAM(S): at 23:49

## 2021-02-10 RX ADMIN — LOSARTAN POTASSIUM 50 MILLIGRAM(S): 100 TABLET, FILM COATED ORAL at 19:44

## 2021-02-10 RX ADMIN — LOSARTAN POTASSIUM 25 MILLIGRAM(S): 100 TABLET, FILM COATED ORAL at 05:57

## 2021-02-10 NOTE — CONSULT NOTE ADULT - PROBLEM SELECTOR RECOMMENDATION 9
Pre op for CABG in the future   pre op workup in progress   Cont BB Lipitor   Heparin gtt tonight for CAD  DW Dr Izquierdo

## 2021-02-10 NOTE — CONSULT NOTE ADULT - ASSESSMENT
This is a 71 y/o M with a h/o CAD s/p Stents in the past now with symptomatic CP.  + CAD on CATH today. Pre op [facility] with Dr De La Cruz

## 2021-02-10 NOTE — CONSULT NOTE ADULT - SUBJECTIVE AND OBJECTIVE BOX
Surgeon: Felecia    Consult requesting by: Izaiah     HISTORY OF PRESENT ILLNESS:  71 y/o M with PMHX CAD/MI s/p PCI x9, HTN, HLD, Hx Sinus Pauses, Tobacco Abuse presents to [facility] ER c/o L sided Chest Pain which began acutely the morning PTA at rest and was relieved at that time by ASA 325mg PO but returned later in the day. CP radiates from L hand and across chest. Similar in description to CP 2 years ago for which he had stents placed. Relieved with SL Nitro 0.4mg x1 in ER. +associated Palpitations. No diaphoresis. No associated SOB/BARRERA. Denies cough. Active Smoker but quit smoking 3 days ago. Has not seen Cardio for past 2 years. Compliant with medications. Plavix dc'd by Cardio last post-PCI. +Hx prior PCI to RCA, LAD, and Diag with repeat RCA occlusion in 2018 s/p 2 more PCI STEPHAN (total 9 stents per patient). (H&P 2/10)    Pt seen in CATH lab. Resting. Anxious.  Patient denies CP, SOB, HA, N & V.  Spoke to patient about CABG procedure. Plan for Heparin gtt as per Cardio for CAD.      PAST MEDICAL & SURGICAL HISTORY:  CAD in native artery    Sinus pause    HLD (hyperlipidemia)    HTN (hypertension)    Stented coronary artery  x9        MEDICATIONS  (STANDING):  aspirin enteric coated 81 milliGRAM(s) Oral daily  atorvastatin 80 milliGRAM(s) Oral at bedtime  chlorhexidine 0.12% Liquid 15 milliLiter(s) Swish and Spit two times a day  heparin  Infusion.  Unit(s)/Hr (10 mL/Hr) IV Continuous <Continuous>  influenza   Vaccine 0.5 milliLiter(s) IntraMuscular once  losartan 50 milliGRAM(s) Oral daily  metoprolol succinate ER 50 milliGRAM(s) Oral daily  mupirocin 2% Ointment 1 Application(s) Topical two times a day    MEDICATIONS  (PRN):  aluminum hydroxide/magnesium hydroxide/simethicone Suspension 30 milliLiter(s) Oral every 4 hours PRN Dyspepsia  heparin   Injectable 6000 Unit(s) IV Push every 6 hours PRN For aPTT less than 40  nitroglycerin     SubLingual 0.4 milliGRAM(s) SubLingual every 5 minutes PRN Chest Pain  ondansetron Injectable 4 milliGRAM(s) IV Push every 6 hours PRN Nausea    Antiplatelet therapy:      NA                     Last dose/amt: ** Patient stated that he took one plavix 2 weeks ago when CP started     Allergies    No Known Allergies    Intolerances        SOCIAL HISTORY:  Smoker: [x ] Yes  [ ] No        PACK YEARS:    60 years 1 PPD                     WHEN QUIT? 2 days ago    ETOH use: [ ] Yes  [ x] No              FREQUENCY / QUANTITY:  Ilicit Drug use:  [ ] Yes  [x ] No  Occupation: installs sprinklers   Live with: alone   Assisted device use: no     FAMILY HISTORY:  No pertinent family history in first degree relatives        Review of Systems  CONSTITUTIONAL:  Fevers[ ] chills[ ] sweats[ ] fatigue[ ] weight loss[ ] weight gain [ ]                                     NEGATIVE [x ]   NEURO:  parasthenia ] seizures [ ]  syncope [ ]  confusion [ ]                                                                                NEGATIVE[x ]   EYES: glasses[ ]  blurry vision[ ]  discharge[ ] pain[ ] glaucoma [ ]                                                                          NEGATIVE[x ]   ENMT:  difficulty hearing [ ]  vertigo[ ]  dysphagia[ ] epistaxis[ ] recent dental work [ ]                                    NEGATIVE[x ]   CV:  chest pain[ ] palpitations[ ] BARRERA [x ] diaphoresis [ ]                                                                                           NEGATIVE[ ]   RESPIRATORY:  wheezing[ ] SOB[ ] cough [ ] sputum[ ] hemoptysis[ ]                                                                  NEGATIVE[x ]   GI:  nausea[ ]  vommiting [ ]  diarrhea[ ] constipation [ ] melena [ ]                                                                      NEGATIVE[x ]   : hematuria[ ]  dysuria[ ] urgency[ ] incontinence[ ]                                                                                            NEGATIVE[x ]   MUSKULOSKELETAL:  arthritis[ ]  joint swelling [ ] muscle weakness [ ]                                                                NEGATIVE[x ]   SKIN/BREAST:  rash[ ] itching [ ]  hair loss[ ] masses[ ]                                                                                              NEGATIVE[ x]   PSYCH:  dementia [ ] depression [ ] anxiety[ ]                                                                                                               NEGATIVE[x ]   HEME/LYMPH:  bruises easily[ ] enlarged lymph nodes[ ] tender lymph nodes[ ]                                               NEGATIVE[x ]   ENDOCRINE:  cold intolerance[ ] heat intolerance[ ] polydipsia[ ]                                                                          NEGATIVE[x ]     PHYSICAL EXAM  Vital Signs Last 24 Hrs  T(C): 36.9 (10 Feb 2021 16:25), Max: 36.9 (10 Feb 2021 15:29)  T(F): 98.5 (10 Feb 2021 16:25), Max: 98.5 (10 Feb 2021 15:29)  HR: 85 (10 Feb 2021 18:45) (69 - 90)  BP: 179/81 (10 Feb 2021 18:45) (140/63 - 180/72)  BP(mean): --  RR: 16 (10 Feb 2021 18:45) (16 - 18)  SpO2: 98% (10 Feb 2021 18:45) (97% - 99%)    CONSTITUTIONAL:                                                                          WNL[x ]   Neuro: WNL[ x] Normal exam oriented to person/place & time with no focal motor or sensory  deficits. Other                     Eyes: WNL[ x]   Normal exam of conjunctiva & lids, pupils equally reactive. Other     ENT: WNL[x ]    Normal exam of nasal/oral mucosa with absence of cyanosis. Other  Neck: WNL[ x]  Normal exam of jugular veins, trachea & thyroid. Other  Chest: WNL[x ] Normal lung exam with good air movement absence of wheezes, rales, or rhonchi: Other                                                                                CV:  Auscultation: normal [x ] S3[ ] S4[ ] Irregular [ ] Rub[ ] Clicks[ ]    Murmurs none:[x ]systolic [ ]  diastolic [ ] holosystolic [ ]  Carotids: No Bruits[x ] Other                 Abdominal Aorta: normal [ ] nonpalpable[x ]Other                                                                                      GI:           WNL[x ] Normal exam of abdomen, liver & spleen with no noted masses or tenderness. Other                                                                                                        Extremities: WNL[ x] Normal no evidence of cyanosis or deformity Edema: none[ ]trace[x ]1+[ ]2+[ ]3+[ ]4+[ ]  Lower Extremity Pulses: Right[2+ ] Left[2+ ]Varicosities[ ]  SKIN :WNL[x ] Normal exam to inspection & palation. Other:                                                          LABS:                        14.7   9.39  )-----------( 203      ( 10 Feb 2021 04:16 )             45.0     02-10    137  |  102  |  21.0<H>  ----------------------------<  116<H>  4.2   |  25.0  |  0.72    Ca    9.6      10 Feb 2021 04:16  Phos  3.2     02-10  Mg     2.1     02-10    TPro  6.9  /  Alb  4.0  /  TBili  0.3<L>  /  DBili  x   /  AST  22  /  ALT  22  /  AlkPhos  106  02-10    PT/INR - ( 10 Feb 2021 04:16 )   PT: 12.4 sec;   INR: 1.07 ratio         PTT - ( 10 Feb 2021 04:16 )  PTT:30.5 sec    CARDIAC MARKERS ( 10 Feb 2021 10:09 )  x     / 0.11 ng/mL / 123 U/L / x     / x      CARDIAC MARKERS ( 10 Feb 2021 04:16 )  x     / 0.04 ng/mL / 70 U/L / x     / x      CARDIAC MARKERS ( 10 Feb 2021 00:57 )  x     / 0.02 ng/mL / x     / x     / x              Cardiac Cath: < from: Cardiac Cath Lab - Adult (02.10.21 @ 17:00) >  VENTRICLES: Global left ventricular function was normal. EF calculated by  contrast ventriculography was 55 %.  CORONARY VESSELS: The coronary circulation is right dominant.  LM:   --  Proximal left main: Angiography showed minor luminal  irregularities with no flow limiting lesions.  LAD:   --  Proximal LAD: There was a 70% stenosis.  --  Mid LAD: There was a 20 % stenosis at the site of a prior stent.  --  Distal LAD: There was a 100 % stenosis. The distal LAD is occluded at  the distal stent edge and fills via collateral flow from septal branches.  CX:   --  Proximal circumflex: Angiography showed minor luminal  irregularities with no flow limiting lesions.  --  Mid circumflex: Angiography showed minor luminal irregularities with no  flow limiting lesions.  --  Distal circumflex: Angiography showed minor luminalirregularities with  no flow limiting lesions.  RI:   --  Proximal ramus intermedius: There was a 70 % stenosis.  RCA:   --  Proximal RCA: Angiography showed minor luminal irregularities  with no flow limiting lesions.  --  Mid RCA: There was a 90 % stenosis at the site of a prior stent.  --  Distal RCA: There was a 80 % stenosis at the site of a prior stent.  --  RPDA: There was a 30 % stenosis.  --  RPLS: There was a 70 % stenosis.  COMPLICATIONS: There were no complications. No complications occurred  during the cath lab visit.  DIAGNOSTIC IMPRESSIONS: Severe in stent restenosis of the mid and distal  RCA  70% proximal LAD stenosis  Occluded distal LAD that fills via septal collaterals.  70% ostial ramus stenosis    < end of copied text >      TTE / EMILY: P

## 2021-02-10 NOTE — ED ADULT NURSE REASSESSMENT NOTE - NS ED NURSE REASSESS COMMENT FT1
pt states pain has subsided s/p nitroglycerin. currently stating he would like to hold off on new medicine orders.

## 2021-02-10 NOTE — CONSULT NOTE ADULT - SUBJECTIVE AND OBJECTIVE BOX
[facility], THE [facility]                                   [address] [address]                                                      PHONE: [phone]                                                         FAX: [phone]  [email or web address]  ---------------------------------------------------------------------------------------------------------------------------------    Reason for Consult: chest pain     HPI:  KIRSTEN KAUFMAN is an 70y Male  with history of CAD/MI remote PCI to LAD/RCA lasted cardiac cath Prior stent in LAD is patent with 100% occluded RCA in which RCA was treated with thrombectomy/PTCA/STENT (STEPHAN-Resolute) good results, HTN, HLD, Tobacco Abuse who presents [facility] ER c/o left sided Chest Pain which began acutely the morning at rest and was relieved at that time by ASA 325mg PO but returned later in the day. The patient chest pain radiates from L hand and across chest which was similar in description to chest pain 2 years ago for which he had stents placed. The patient was relieved with SL Nitro 0.4mg x1 in ER.  Currently chest pain free and denies any chest pain orthopnea or PND.      PAST MEDICAL & SURGICAL HISTORY:  CAD in native artery    Sinus pause    HLD (hyperlipidemia)    HTN (hypertension)    Stented coronary artery  x9    No Known Allergies    MEDICATIONS  (STANDING):  aspirin enteric coated 81 milliGRAM(s) Oral daily  atorvastatin 80 milliGRAM(s) Oral at bedtime  enoxaparin Injectable 40 milliGRAM(s) SubCutaneous daily  losartan 50 milliGRAM(s) Oral daily  metoprolol succinate ER 50 milliGRAM(s) Oral daily    MEDICATIONS  (PRN):  aluminum hydroxide/magnesium hydroxide/simethicone Suspension 30 milliLiter(s) Oral every 4 hours PRN Dyspepsia  nitroglycerin     SubLingual 0.4 milliGRAM(s) SubLingual every 5 minutes PRN Chest Pain  ondansetron Injectable 4 milliGRAM(s) IV Push every 6 hours PRN Nausea      Social History:  Cigarettes:         ex smoker            Alchohol:        none          Illicit Drug Abuse:  none     FH negative for CAD     ROS: Negative other than as mentioned in HPI.    Vital Signs Last 24 Hrs  T(C): 36.7 (10 Feb 2021 03:14), Max: 36.7 (10 Feb 2021 03:14)  T(F): 98 (10 Feb 2021 03:14), Max: 98 (10 Feb 2021 03:14)  HR: 78 (10 Feb 2021 07:23) (71 - 90)  BP: 157/74 (10 Feb 2021 07:23) (157/74 - 175/85)  BP(mean): --  RR: 18 (10 Feb 2021 07:23) (18 - 18)  SpO2: 98% (10 Feb 2021 07:23) (97% - 98%)  ICU Vital Signs Last 24 Hrs  KIRSTEN KAUFMAN  I&O's Detail    I&O's Summary    Drug Dosing Weight  KIRSTEN JOHNSONLYDIA      PHYSICAL EXAM:  General: Appears well developed, well nourished alert and cooperative.  HEENT: Head; normocephalic, atraumatic.  Eyes: Pupils reactive, cornea wnl.  Neck: Supple, no nodes adenopathy, no NVD or carotid bruit or thyromegaly.  CARDIOVASCULAR: Normal S1 and S2, No murmur, rub, gallop or lift.   LUNGS: No rales, rhonchi or wheeze. Normal breath sounds bilaterally.  ABDOMEN: Soft, nontender without mass or organomegaly. bowel sounds normoactive.  EXTREMITIES: No clubbing, cyanosis or edema. Distal pulses wnl.   SKIN: warm and dry with normal turgor.  NEURO: Alert/oriented x 3/normal motor exam. No pathologic reflexes.    PSYCH: normal affect.        LABS:                        14.7   9.39  )-----------( 203      ( 10 Feb 2021 04:16 )             45.0     02-10    137  |  102  |  21.0<H>  ----------------------------<  116<H>  4.2   |  25.0  |  0.72    Ca    9.6      10 Feb 2021 04:16  Phos  3.2     02-10  Mg     2.1     02-10    TPro  6.9  /  Alb  4.0  /  TBili  0.3<L>  /  DBili  x   /  AST  22  /  ALT  22  /  AlkPhos  106  02-10    KIRSTEN JOHNSONLYDIA  CARDIAC MARKERS ( 10 Feb 2021 04:16 )  x     / 0.04 ng/mL / 70 U/L / x     / x      CARDIAC MARKERS ( 10 Feb 2021 00:57 )  x     / 0.02 ng/mL / x     / x     / x          PT/INR - ( 10 Feb 2021 04:16 )   PT: 12.4 sec;   INR: 1.07 ratio         PTT - ( 10 Feb 2021 04:16 )  PTT:30.5 sec      RADIOLOGY & ADDITIONAL STUDIES:    INTERPRETATION OF TELEMETRY (personally reviewed):    ECG: NSR without ischemic changes     ECHO:  Summary:   1. Normal left atrial size.   2. There is moderate concentric left ventricular hypertrophy.   3. Segmental wall motion abnormality in RCA territory. Left ventricular   ejection fraction, by visual estimation, is 60 to 65%.   4. Mildly enlarged right atrium.   5. Normal right ventricular size and function.   6. No significant valvular abnormality.   7. There is no evidence of pericardial effusion.    [id number] Daily Tran MD, [facility], Electronically signed on 7/27/2018 at   11:41:57 AM        CARDIAC CATHETERIZATION:  VENTRICLES: LVEF 45% with hypo inf wall  CORONARY VESSELS: The coronary circulation is right dominant.  LM:   --  LM: Normal.  LAD:   --  Proximal LAD: There was a 40 % stenosis.  --  Mid LAD: There was a 0 % stenosis at the site of a prior stent.  CX:   --  Circumflex: Angiography showed minor luminal irregularities with  no flow limiting lesions.  RCA:   --  Mid RCA: There was a 100 % stenosis at the site of a prior  stent.  --  Distal RCA: There was a 80 % stenosis.  COMPLICATIONS: There were no complications. No complications occurred  during the cath lab visit.  SUMMARY:  Summary: Addendum 7/27/2018: case reconfirmed to add Dr. Sharma as  Interventional Cardiologist to DMS report  DIAGNOSTIC IMPRESSIONS: Prior stent in LAD is patent with 100% occluded  RCA. The RCA was treated with thrombectomy/PTCA/STENT (STEPHAN-Resolute) with  good result.  DIAGNOSTIC RECOMMENDATIONS: ASA and Brilinta  INTERVENTIONAL IMPRESSIONS: Prior stent in LAD is patent with 100% occluded  RCA. The RCA was treated with thrombectomy/PTCA/STENT (STEPHAN-Resolute) with  good result.  INTERVENTIONAL RECOMMENDATIONS: ASA and Brilinta  Prepared and signed by  Rajat Shrama MD      Assessment and Plan:  In summary, KIRSTEN KAUFMAN is an 70y Male with past medical history significant for CAD/MI remote PCI to LAD/RCA lasted cardiac cath Prior stent in LAD is patent with 100% occluded RCA in which RCA was treated with thrombectomy/PTCA/STENT (STEPHAN-Resolute) good results, HTN, HLD, Tobacco Abuse who presents [facility] ER c/o left sided Chest Pain which began acutely the morning at rest and was relieved at that time by ASA 325mg PO but returned later in the day. The patient chest pain radiates from L hand and across chest which was similar in description to chest pain 2 years ago for which he had stents placed. The patient was relieved with SL Nitro 0.4mg x1 in ER.  Currently chest pain free.  No evidence of heart failure and trop negative thus far.      Plan  1.  Left heart cath today   2.  Increase Losartan 25 mg to 50 mg daily   3.  Start Toprol XL 50 mg daily   4.  Continue ASA and statin therapy   5.  Awaiting TTE

## 2021-02-10 NOTE — H&P ADULT - NSHPLABSRESULTS_GEN_ALL_CORE
CXR: mild pulm vasc congestion otherwise clear    EKG: NSR LAE Intraventricular Block inferior TWI similar to prior EKG from 2018    Prior EKG 2018: NSR with RBBB and inferior TWI    TTE (2018): LVEF 60-65, +RWMA RCA

## 2021-02-10 NOTE — ED ADULT NURSE NOTE - OBJECTIVE STATEMENT
Pt presents a&ox4, NAD noted, respirations even and nonlabored. Pt c/o nonradiating chest pain beginning tuesday morning. pain subsided with 324 asa but returned in the afternoon. pt with hx of stents placed in 2018, which was also his last cardiology visit.

## 2021-02-10 NOTE — ED ADULT NURSE REASSESSMENT NOTE - NS ED NURSE REASSESS COMMENT FT1
Assumed care of pt at this time. pt a+ox3, sitting up comfortably in bed. pt denies any chest pain or SOB, reports feeling much better after receiving 1 SL Nitro last night. pt in no apparent distress, NSR on monitor will continue to monitor.

## 2021-02-10 NOTE — PROGRESS NOTE ADULT - SUBJECTIVE AND OBJECTIVE BOX
Patient is a 70y old  Male who presents with a chief complaint of Chest Pain (10 Feb 2021 10:26)      HPI:  71 y/o M with PMHX CAD/MI s/p PCI x9, HTN, HLD, Hx Sinus Pauses, Tobacco Abuse presents to [facility] ER c/o L sided Chest Pain which began acutely the morning PTA at rest and was relieved at that time by ASA 325mg PO but returned later in the day. CP radiates from L hand and across chest. Similar in description to CP 2 years ago for which he had stents placed. Relieved with SL Nitro 0.4mg x1 in ER. +associated Palpitations. No diaphoresis. No associated SOB/BARRERA. Denies cough. Active Smoker but quit smoking 3 days ago. Has not seen Cardio for past 2 years. Compliant with medications. Plavix dc'd by Cardio last post-PCI. +Hx prior PCI to RCA, LAD, and Diag with repeat RCA occlusion in 2018 s/p 2 more PCI STEPHAN (total 9 stents per patient).     ROS negative unless mentioned above. (10 Feb 2021 03:06)      PAST MEDICAL & SURGICAL HISTORY:  CAD in native artery    Sinus pause    HLD (hyperlipidemia)    HTN (hypertension)    Stented coronary artery  x9        PREVIOUS DIAGNOSTIC TESTING:    < from: Cardiac Cath Lab - Adult (07.26.18 @ 19:51) >  LM:   --  LM: Normal.  LAD:   --  Proximal LAD: There was a 40 % stenosis.  --  Mid LAD: There was a 0 % stenosis at the site of a prior stent.  CX:   --  Circumflex: Angiography showed minor luminal irregularities with  no flow limiting lesions.  RCA:   --  Mid RCA: There was a 100 % stenosis at the site of a prior  stent.  --  Distal RCA: There was a 80 % stenosis.  COMPLICATIONS: There were no complications. No complications occurred  during the cath lab visit.  SUMMARY:  Summary: Addendum 7/27/2018: case reconfirmed to add Dr. Sharma as  Interventional Cardiologist to DMS report  DIAGNOSTIC IMPRESSIONS: Prior stent in LAD is patent with 100% occluded  RCA. The RCA was treated with thrombectomy/PTCA/STENT (STEPHAN-Resolute) with  good result.  DIAGNOSTIC RECOMMENDATIONS: ASA and Brilinta  INTERVENTIONAL IMPRESSIONS: Prior stent in LAD is patent with 100% occluded  RCA. The RCA was treated with thrombectomy/PTCA/STENT (STEPHAN-Resolute) with  good result.  INTERVENTIONAL RECOMMENDATIONS: ASA and Brilinta  Prepared and signed by  Rajat Sharma MD  Signed 07/27/2018 11:51:14    < end of copied text >        Allergies  No Known Allergies        MEDICATIONS  (STANDING):  aspirin enteric coated 81 milliGRAM(s) Oral daily  atorvastatin 80 milliGRAM(s) Oral at bedtime  influenza   Vaccine 0.5 milliLiter(s) IntraMuscular once  losartan 50 milliGRAM(s) Oral daily  metoprolol succinate ER 50 milliGRAM(s) Oral daily    MEDICATIONS  (PRN):  aluminum hydroxide/magnesium hydroxide/simethicone Suspension 30 milliLiter(s) Oral every 4 hours PRN Dyspepsia  nitroglycerin     SubLingual 0.4 milliGRAM(s) SubLingual every 5 minutes PRN Chest Pain  ondansetron Injectable 4 milliGRAM(s) IV Push every 6 hours PRN Nausea    	    Vital Signs Last 24 Hrs  T(C): 36.9 (10 Feb 2021 16:25), Max: 36.9 (10 Feb 2021 15:29)  T(F): 98.5 (10 Feb 2021 16:25), Max: 98.5 (10 Feb 2021 15:29)  HR: 72 (10 Feb 2021 16:25) (69 - 90)  BP: 154/76 (10 Feb 2021 16:25) (144/73 - 175/85)    RR: 16 (10 Feb 2021 16:25) (16 - 18)  SpO2: 98% (10 Feb 2021 16:25) (97% - 99%)        PHYSICAL EXAM:  Appearance: Normal, well nourished	  HEENT:   Normal oral mucosa, PERRL, EOMI, sclera non-icteric	  Cardiovascular: Normal S1 S2, No JVD, No cardiac murmurs, No carotid bruits, No peripheral edema  Respiratory: Lungs clear to auscultation	  Psychiatry: A & O x 3, Mood & affect appropriate  Neurologic: Grossly non-focal with full strength in all four extremities  Extremities: Normal range of motion, No clubbing, cyanosis or edema  Vascular: Peripheral pulses palpable 2+ bilaterally      INTERPRETATION OF TELEMETRY:  Sinus Rhythm      LABS:                        14.7   9.39  )-----------( 203      ( 10 Feb 2021 04:16 )             45.0     02-10    137  |  102  |  21.0<H>  ----------------------------<  116<H>  4.2   |  25.0  |  0.72    Ca    9.6      10 Feb 2021 04:16  Phos  3.2     02-10  Mg     2.1     02-10    TPro  6.9  /  Alb  4.0  /  TBili  0.3<L>  /  DBili  x   /  AST  22  /  ALT  22  /  AlkPhos  106  02-10    CARDIAC MARKERS ( 10 Feb 2021 10:09 )  x     / 0.11 ng/mL / 123 U/L / x     / x      CARDIAC MARKERS ( 10 Feb 2021 04:16 )  x     / 0.04 ng/mL / 70 U/L / x     / x      CARDIAC MARKERS ( 10 Feb 2021 00:57 )  x     / 0.02 ng/mL / x     / x     / x          PT/INR - ( 10 Feb 2021 04:16 )   PT: 12.4 sec;   INR: 1.07 ratio         PTT - ( 10 Feb 2021 04:16 )  PTT:30.5 sec    I&O's Summary    BNPSerum Pro-Brain Natriuretic Peptide: 202 pg/mL (02-10 @ 00:57)    Serum Pro-Brain Natriuretic Peptide: 202 pg/mL (02-10-21 @ 00:57)    Assessment and Plan    Chest pain with small troponin leak,  Symptoms relieved with NTG.  [facility]

## 2021-02-10 NOTE — H&P ADULT - NSICDXPASTMEDICALHX_GEN_ALL_CORE_FT
PAST MEDICAL HISTORY:  CAD in native artery     HLD (hyperlipidemia)     HTN (hypertension)     Sinus pause

## 2021-02-10 NOTE — PROGRESS NOTE ADULT - SUBJECTIVE AND OBJECTIVE BOX
Nurse Practitioner Progress note:   s/p LHC revealing severe multivessel disease.      Patient feels well.  Denies chest pain, shortness of breath, dizziness or palpitations at this time      Right radial hemoband in place.  Procedure site CDI, no bleeding, no hematoma, able to move all digits with capillary refill < 3 seconds, fingers warm      T(C): 36.9 (02-10-21 @ 16:25), Max: 36.9 (02-10-21 @ 15:29)  HR: 72 (02-10-21 @ 16:25) (69 - 90)  BP: 154/76 (02-10-21 @ 16:25) (144/73 - 175/85)  RR: 16 (02-10-21 @ 16:25) (16 - 18)  SpO2: 98% (02-10-21 @ 16:25) (97% - 99%)    CBC Full  -  ( 10 Feb 2021 04:16 )  WBC Count : 9.39 K/uL  RBC Count : 5.10 M/uL  Hemoglobin : 14.7 g/dL  Hematocrit : 45.0 %  Platelet Count - Automated : 203 K/uL  Mean Cell Volume : 88.2 fl  Mean Cell Hemoglobin : 28.8 pg  Mean Cell Hemoglobin Concentration : 32.7 gm/dL  Auto Neutrophil # : 6.87 K/uL  Auto Lymphocyte # : 1.48 K/uL  Auto Monocyte # : 0.75 K/uL  Auto Eosinophil # : 0.19 K/uL  Auto Basophil # : 0.06 K/uL  Auto Neutrophil % : 73.2 %  Auto Lymphocyte % : 15.8 %  Auto Monocyte % : 8.0 %  Auto Eosinophil % : 2.0 %  Auto Basophil % : 0.6 %    02-10    137  |  102  |  21.0<H>  ----------------------------<  116<H>  4.2   |  25.0  |  0.72    Ca    9.6      10 Feb 2021 04:16  Phos  3.2     02-10  Mg     2.1     02-10    TPro  6.9  /  Alb  4.0  /  TBili  0.3<L>  /  DBili  x   /  AST  22  /  ALT  22  /  AlkPhos  106  02-10    CARDIAC MARKERS ( 10 Feb 2021 10:09 )  x     / 0.11 ng/mL / 123 U/L / x     / x      CARDIAC MARKERS ( 10 Feb 2021 04:16 )  x     / 0.04 ng/mL / 70 U/L / x     / x      CARDIAC MARKERS ( 10 Feb 2021 00:57 )  x     / 0.02 ng/mL / x     / x     / x              MEDICATIONS  (STANDING):  aspirin enteric coated 81 milliGRAM(s) Oral daily  atorvastatin 80 milliGRAM(s) Oral at bedtime  influenza   Vaccine 0.5 milliLiter(s) IntraMuscular once  losartan 50 milliGRAM(s) Oral daily  metoprolol succinate ER 50 milliGRAM(s) Oral daily    MEDICATIONS  (PRN):  aluminum hydroxide/magnesium hydroxide/simethicone Suspension 30 milliLiter(s) Oral every 4 hours PRN Dyspepsia  nitroglycerin     SubLingual 0.4 milliGRAM(s) SubLingual every 5 minutes PRN Chest Pain  ondansetron Injectable 4 milliGRAM(s) IV Push every 6 hours PRN Nausea        HPI:  71 y/o M with PMHX CAD/MI s/p PCI x9, HTN, HLD, Hx Sinus Pauses, Tobacco Abuse presents to [facility] ER c/o L sided Chest Pain which began acutely the morning PTA at rest and was relieved at that time by ASA 325mg PO but returned later in the day. CP radiates from L hand and across chest. Similar in description to CP 2 years ago for which he had stents placed. Relieved with SL Nitro 0.4mg x1 in ER. +associated Palpitations. No diaphoresis. No associated SOB/BARRERA. Denies cough. Active Smoker but quit smoking 3 days ago. Has not seen Cardio for past 2 years. Compliant with medications. Plavix dc'd by Cardio last post-PCI. +Hx prior PCI to RCA, LAD, and Diag with repeat RCA occlusion in 2018 s/p 2 more PCI STEPHAN (total 9 stents per patient).     ROS negative unless mentioned above. (10 Feb 2021 03:06)    now s/p [facility] revealing severe multivessel disease    ASSESSMENT/PLAN:    Severe multivessel Coronary artery disease  -CTS consulted - Dr Evans  -VS, labs, diet, activity as per PCI orders  -IV hydration  -Encourage PO fluids  -Aspirin 81 mg PO daily  -No Plavix in prep for CTS  -Start Heparin tonight at 2200 after band is off for approx 3 hours  -Continue all other medications  -Plan of care discussed with patient, family and MD  -likely d/c in AM if patient remains stable overnight  -NP to see in AM to evaluate labs, EKG and procedure site check

## 2021-02-10 NOTE — H&P ADULT - ASSESSMENT
ASSESSMENT:   71 y/o M with PMHX CAD/MI s/p PCI x9, HTN, HLD, Hx Sinus Pauses, Tobacco Abuse admitted for Typical Chest Pain r/o ACS    PLAN:  Typical Chest Pain  -admit to medicine  -monitor telemetry  -check electrolytes  -trend cardiac enzymes (Trop negative x1)  -serial EKGs   -check TTE  -s/p  PTA continue ASA 81mg PO daily  -Atorvastatin 80mg PO QHS  -CP resolved s/p SLN x1 continue PRN  -VTE PPX  -NPO PMN  -Cardiology Consult ([facility])    Hx CAD/MI s/p PCI x9, HTN, HLD, Hx Sinus Pauses   -Losartan 25mg PO QHS  -No BB 2/2 hx sinus pauses and bradycardia last admission  -Atorvastatin as above  -repeat VS/monitor BP     Tobacco Abuse  -quit 3 days ago.  continued cessation. Nicotine Patch PRN if necessary.

## 2021-02-10 NOTE — CHART NOTE - NSCHARTNOTEFT_GEN_A_CORE
CC: Chest pain       Overnight/ Am events:  Patient seen and examined at bedside. No acute events overnight. Patient states he did not have any further episodes of chest pain since yest, last episode of chest pain resolved after being given asa and nitro. Pt reports having multiple stents in the past, understands he will be having a cardiac cath today. Also states when he had the chest pain it was a burning/ heaviness substernal area that occurred at rest. Patient denies SOB, abd pain, peripheral edema, orthopnea, N/V, fever, chills, dysuria or any other complaints. All remainder ROS negative.             Vital Signs Last 24 Hrs  T(C): 36.8 (10 Feb 2021 10:50), Max: 36.8 (10 Feb 2021 10:50)  T(F): 98.3 (10 Feb 2021 10:50), Max: 98.3 (10 Feb 2021 10:50)  HR: 69 (10 Feb 2021 10:50) (69 - 90)  BP: 153/71 (10 Feb 2021 10:50) (153/71 - 175/85)  BP(mean): --  RR: 18 (10 Feb 2021 10:50) (18 - 18)  SpO2: 97% (10 Feb 2021 10:50) (97% - 98%) CC: Chest pain       Overnight/ Am events:  Patient seen and examined at bedside. No acute events overnight. Patient states he did not have any further episodes of chest pain since yest, last episode of chest pain resolved after being given asa and nitro. Pt reports having multiple stents in the past, understands he will be having a cardiac cath today. Also states when he had the chest pain it was a burning/ heaviness substernal area that occurred at rest. Patient denies SOB, abd pain, peripheral edema, orthopnea, N/V, fever, chills, dysuria or any other complaints. All remainder ROS negative.         Vital Signs Last 24 Hrs  T(C): 36.8 (10 Feb 2021 10:50), Max: 36.8 (10 Feb 2021 10:50)  T(F): 98.3 (10 Feb 2021 10:50), Max: 98.3 (10 Feb 2021 10:50)  HR: 69 (10 Feb 2021 10:50) (69 - 90)  BP: 153/71 (10 Feb 2021 10:50) (153/71 - 175/85)  BP(mean): --  RR: 18 (10 Feb 2021 10:50) (18 - 18)  SpO2: 97% (10 Feb 2021 10:50) (97% - 98%)      CONSTITUTIONAL: Obese Well appearing, well nourished, awake, alert and in no apparent distress  CARDIAC: Normal rate, regular rhythm.  Heart sounds S1, S2.  No murmurs, rubs or gallops   RESPIRATORY: Breath sounds clear and equal bilaterally. No wheezes, rhales or rhonchi  GASTROENTEROLOGY: Soft nt nd bs +normoactive   EXTREMITIES: No edema, cyanosis or deformity   NEUROLOGICAL: Alert and oriented, no focal deficits, no motor or sensory deficits.  SKIN: No rash, skin turgor wnl       A/p:       - Increased Losartan 25 mg to 50 mg daily   - Toprol XL 50 mg daily   - c/w ASA po qd   - c/w atorvastatin po qhs   - f/u TTE       Obesity- BMI: 32   -Counselled on lifestyle diet modifications     Dvt ppx   -c/w lovenox 40mg sq qd     Activity level: Increase as tolerated     Dispo: Anticipated discharge in 24 hrs CC: Chest pain       Overnight/ Am events:  Patient seen and examined at bedside. No acute events overnight. Patient states he did not have any further episodes of chest pain since yest, last episode of chest pain resolved after being given asa and nitro. Pt reports having multiple stents in the past, understands he will be having a cardiac cath today. Also states when he had the chest pain it was a burning/ heaviness substernal area that occurred at rest. Patient denies SOB, abd pain, peripheral edema, orthopnea, N/V, fever, chills, dysuria or any other complaints. All remainder ROS negative.         Vital Signs Last 24 Hrs  T(C): 36.8 (10 Feb 2021 10:50), Max: 36.8 (10 Feb 2021 10:50)  T(F): 98.3 (10 Feb 2021 10:50), Max: 98.3 (10 Feb 2021 10:50)  HR: 69 (10 Feb 2021 10:50) (69 - 90)  BP: 153/71 (10 Feb 2021 10:50) (153/71 - 175/85)  BP(mean): --  RR: 18 (10 Feb 2021 10:50) (18 - 18)  SpO2: 97% (10 Feb 2021 10:50) (97% - 98%)      CONSTITUTIONAL: Obese Well appearing, well nourished, awake, alert and in no apparent distress  CARDIAC: Normal rate, regular rhythm.  Heart sounds S1, S2.  No murmurs, rubs or gallops   RESPIRATORY: Breath sounds clear and equal bilaterally. No wheezes, rhales or rhonchi  GASTROENTEROLOGY: Soft nt nd bs +normoactive   EXTREMITIES: No edema, cyanosis or deformity   NEUROLOGICAL: Alert and oriented, no focal deficits, no motor or sensory deficits.  SKIN: No rash, skin turgor wnl       A/p: 70 y.o. Male with hx of CAD/MI remote PCI to LAD/RCA lasted cardiac cath Prior stent in LAD is patent with 100% occluded RCA in which RCA was treated with thrombectomy/PTCA/STENT (STEPHAN-Resolute), HTN, HLD, Tobacco dependence; who presents with Chest Pain, received relief with initial asa/ nitro on admission. Pt admitted with chest pain concern for ACS. Currently chest pain free, troponin elevation to 0.11, plan for cardiac cath today. Aside from this pt had htn urgency with improvement in bp, anti hypertensives continue to be optimized.            HTN urgency   -   - Increased Losartan 25 mg to 50 mg daily   - Toprol XL 50 mg daily   - c/w ASA po qd   - c/w atorvastatin po qhs   - f/u TTE       Obesity- BMI: 32   -Counselled on lifestyle diet modifications     Dvt ppx   -holding lovenox for now pending cath will restart post procedure     Activity level: Increase as tolerated     Dispo: Anticipated discharge likely in 1-2 days pending cardiac cath results. Pt to return to home. CC: Chest pain       Overnight/ Am events:  Patient seen and examined at bedside. No acute events overnight. Patient states he did not have any further episodes of chest pain since yest, last episode of chest pain resolved after being given asa and nitro. Pt reports having multiple stents in the past, understands he will be having a cardiac cath today. Also states when he had the chest pain it was a burning/ heaviness substernal area that occurred at rest. Patient denies SOB, abd pain, peripheral edema, orthopnea, N/V, fever, chills, dysuria or any other complaints. All remainder ROS negative.         Vital Signs Last 24 Hrs  T(C): 36.8 (10 Feb 2021 10:50), Max: 36.8 (10 Feb 2021 10:50)  T(F): 98.3 (10 Feb 2021 10:50), Max: 98.3 (10 Feb 2021 10:50)  HR: 69 (10 Feb 2021 10:50) (69 - 90)  BP: 153/71 (10 Feb 2021 10:50) (153/71 - 175/85)  BP(mean): --  RR: 18 (10 Feb 2021 10:50) (18 - 18)  SpO2: 97% (10 Feb 2021 10:50) (97% - 98%)      CONSTITUTIONAL: Obese Well appearing, well nourished, awake, alert and in no apparent distress  CARDIAC: Normal rate, regular rhythm.  Heart sounds S1, S2.  No murmurs, rubs or gallops   RESPIRATORY: Breath sounds clear and equal bilaterally. No wheezes, rhales or rhonchi  GASTROENTEROLOGY: Soft nt nd bs +normoactive   EXTREMITIES: No edema, cyanosis or deformity   NEUROLOGICAL: Alert and oriented, no focal deficits   SKIN: No rash, skin turgor wnl       A/p: 70 y.o. Male with hx of CAD/MI remote PCI to LAD/RCA lasted cardiac cath Prior stent in LAD is patent with 100% occluded RCA in which RCA was treated with thrombectomy/PTCA/STENT (STEPHAN-Resolute), HTN, HLD, Tobacco dependence; who presents with Chest Pain, received relief with initial asa/ nitro on admission. Pt admitted with chest pain concern for ACS. Currently chest pain free, troponin elevation to 0.11, plan for cardiac cath today. Aside from this pt had htn urgency with improvement in bp, anti hypertensives continue to be optimized.     Chest pain with concern for ACS   - underlying hx CAD with multiple stents   - last episode of chest pain last night. Currently chest pain free.  - first two troponin were negative now troponin up trended to 0.11   - EKG NSR  RBBB   -c/w asa, atorvastatin   - cardio started toprol   - f/u tte   - npo for cardiac cath today   - cardio consult noted and appreciated, d/w Dr. Rivas the above plan of care       HTN urgency   - BP improving now to 153/ 71   - Increased Losartan 25 mg to 50 mg daily   - c/w Toprol XL 50 mg daily   - c/w ASA po qd   -cardio f/u noted and appreciated     Low TSH   - pt with no symptoms   -f/u repeat tsh/ f/u free t3/ free t4   - will repeat in additional 3-4 weeks if remains abnormal     Obesity- BMI: 32   -Counselled on lifestyle diet modifications     Dvt ppx   -holding lovenox for now pending cath will restart post procedure     Activity level: Increase as tolerated     Dispo: Anticipated discharge likely in 1-2 days pending cardiac cath results. Pt to return to home. CC: Chest pain       Overnight/ Am events:  Patient seen and examined at bedside. No acute events overnight. Patient states he did not have any further episodes of chest pain since yest, last episode of chest pain resolved after being given asa and nitro. Pt reports having multiple stents in the past, understands he will be having a cardiac cath today. Also states when he had the chest pain it was a burning/ heaviness substernal area that occurred at rest. Patient denies SOB, abd pain, peripheral edema, orthopnea, N/V, fever, chills, dysuria or any other complaints. All remainder ROS negative.         Vital Signs Last 24 Hrs  T(C): 36.8 (10 Feb 2021 10:50), Max: 36.8 (10 Feb 2021 10:50)  T(F): 98.3 (10 Feb 2021 10:50), Max: 98.3 (10 Feb 2021 10:50)  HR: 69 (10 Feb 2021 10:50) (69 - 90)  BP: 153/71 (10 Feb 2021 10:50) (153/71 - 175/85)  BP(mean): --  RR: 18 (10 Feb 2021 10:50) (18 - 18)  SpO2: 97% (10 Feb 2021 10:50) (97% - 98%)      CONSTITUTIONAL: Obese Well appearing, well nourished, awake, alert and in no apparent distress  CARDIAC: Normal rate, regular rhythm.  Heart sounds S1, S2.  No murmurs, rubs or gallops   RESPIRATORY: Breath sounds clear and equal bilaterally. No wheezes, rhales or rhonchi  GASTROENTEROLOGY: Soft nt nd bs +normoactive   EXTREMITIES: No edema, cyanosis or deformity   NEUROLOGICAL: Alert and oriented, no focal deficits   SKIN: No rash, skin turgor wnl       A/p: 70 y.o. Male with hx of CAD/MI remote PCI to LAD/RCA lasted cardiac cath Prior stent in LAD is patent with 100% occluded RCA in which RCA was treated with thrombectomy/PTCA/STENT (STEPHAN-Resolute), HTN, HLD, Tobacco dependence; who presents with Chest Pain, received relief with initial asa/ nitro on admission. Pt admitted with chest pain concern for ACS. Currently chest pain free, troponin elevation to 0.11, plan for cardiac cath today. Aside from this pt had htn urgency with improvement in bp, anti hypertensives continue to be optimized and up titrated.     Chest pain with concern for ACS   - underlying hx CAD with multiple stents   - last episode of chest pain last night. Currently chest pain free.  - first two troponin were negative now troponin up trended to 0.11   - EKG NSR  RBBB   -c/w asa, atorvastatin   - cardio started toprol   - f/u tte   - npo for cardiac cath today   - cardio consult noted and appreciated, d/w Dr. Rivas the above plan of care       HTN urgency   - BP improving now to 153/ 71   - Increased Losartan 25 mg to 50 mg daily   - c/w Toprol XL 50 mg daily   - c/w ASA po qd   -cardio f/u noted and appreciated     Low TSH   - pt with no symptoms   -f/u repeat tsh/ f/u free t3/ free t4   - will repeat in additional 3-4 weeks if remains abnormal     HLD  -c/w atorvastatin po qhs     Obesity- BMI: 32   -Counselled on lifestyle diet modifications     Dvt ppx   -holding lovenox for now pending cath will restart post procedure     Activity level: Increase as tolerated     Dispo: Anticipated discharge likely in 1-2 days pending cardiac cath results. Pt to return to home.

## 2021-02-10 NOTE — ED PROVIDER NOTE - ATTENDING CONTRIBUTION TO CARE
69 yo male w/ hx of multiple cardiac stents presents for evaluation of recurrent chest pain at rest. I personally saw the patient with the PA, and completed the key components of the history and physical exam. I then discussed the management plan with the PA.

## 2021-02-10 NOTE — ED PROVIDER NOTE - MUSCULOSKELETAL NEGATIVE STATEMENT, MLM
Mom wanting recommendations for patient's BMs. Last BM was 4 days ago. Mom states he will go a few days without any BMs, then will have a blow out stool. Patient is more fussy than normal, but currently no other Sx. Only taking breast milk and eating normal amount with no issues. Mom giving gripe water to help. Mom felt patient's stomach while on the phone call, and states it is soft not hard. Please advise. no back pain, no gout, no musculoskeletal pain, no neck pain, and no weakness.

## 2021-02-10 NOTE — ED PROVIDER NOTE - OBJECTIVE STATEMENT
pt is a 71 y/o male with a pmhx of HTN, HLD, cardiac stents x 7, presenting to the ed with chest pain that started since tuesday morning. pt states he took aspirin in the morning helped the chest pain, but it returned later in the afternoon and has worsened. pt states non radiating, constant burning pressure.  pt with associated SOB. pt last followed up with cardiology the last time he was at [facility] for chest pain in 2018. pt quit smoking two days ago. pt denies palpitations, fever cough abd pain nausea vomiting calf pain hx of dvt or pe recent surgeries or travel.

## 2021-02-10 NOTE — H&P ADULT - NSHPPHYSICALEXAM_GEN_ALL_CORE
Vital Signs Last 24 Hrs  T(C): 36.7 (10 Feb 2021 03:14), Max: 36.7 (10 Feb 2021 03:14)  T(F): 98 (10 Feb 2021 03:14), Max: 98 (10 Feb 2021 03:14)  HR: 71 (10 Feb 2021 03:14) (71 - 90)  BP: 164/76 (10 Feb 2021 03:14) (164/76 - 175/85)  BP(mean): --  RR: 18 (10 Feb 2021 03:14) (18 - 18)  SpO2: 97% (10 Feb 2021 03:14) (97% - 98%)    Constitutional: Well-appearing, NAD, VSS  Head: NC/AT  Eyes: PERRL, EOMI, anicteric sclera, conjunctiva WNL  ENT: Normal Pharynx, MMM, No tonsillar exudate/erythema  Neck: Supple, Non-tender  Chest: Non-tender, no rashes  Cardio: RRR, s1/s2, no appreciable murmurs/rubs/gallops  Resp: BS CTA bilaterally, no wheezing/rhonchi/rales  Abd: Soft, Non-tender, Non-distended, no rebound/guarding/rigidity  MSK: moving all extremities, no motor weakness, full ROM x4  Ext: palpable distal pulses, good capillary refill, no clubbing/cyanosis/edema  Psych: appropriate, cooperative  Neuro: CN II-XII grossly intact, no focal deficits  Skin: Warm/Dry. No rashes.

## 2021-02-10 NOTE — H&P ADULT - NSHPOUTPATIENTPROVIDERS_GEN_ALL_CORE
Cardio - Rosalie ([facility])  PCP - Dr Hare (Retired), saw someone new in [place] but did not like them.

## 2021-02-10 NOTE — ED PROVIDER NOTE - PHYSICAL EXAMINATION
Const: Awake, alert and oriented. In no acute distress. Well appearing.  HEENT: NC/AT. Moist mucous membranes.  Eyes: No scleral icterus. EOMI.  Neck:. Soft and supple. Full ROM without pain.  Cardiac:  +S1/S2. No murmurs. Peripheral pulses 2+ and symmetric. pedal edema 2+ bilaterally   Resp: Speaking in full sentences. No evidence of respiratory distress. No wheezes, rales or rhonchi.  Abd: Soft, non-tender, non-distended. Normal bowel sounds in all 4 quadrants. No guarding or rebound.  Back: Spine midline and non-tender. No CVAT.  Skin: No rashes, abrasions or lacerations.  Lymph: No cervical lymphadenopathy.  Neuro: Awake, alert & oriented x 3. CN II-XII intact,  Moves all extremities symmetrically.

## 2021-02-10 NOTE — H&P ADULT - HISTORY OF PRESENT ILLNESS
71 y/o M with PMHX CAD/MI s/p PCI x9, HTN, HLD, Hx Sinus Pauses, Tobacco Abuse presents to [facility] ER c/o L sided Chest Pain which began acutely the morning PTA at rest and was relieved at that time by ASA 325mg PO but returned later in the day. CP radiates from L hand and across chest. Similar in description to CP 2 years ago for which he had stents placed. Relieved with SL Nitro 0.4mg x1 in ER. +associated Palpitations. No diaphoresis. No associated SOB/BARRERA. Denies cough. Active Smoker but quit smoking 3 days ago. Has not seen Cardio for past 2 years. Compliant with medications. Plavix dc'd by Cardio last post-PCI. +Hx prior PCI to RCA, LAD, and Diag with repeat RCA occlusion in 2018 s/p 2 more PCI STEPHAN (total 9 stents per patient).     ROS negative unless mentioned above.

## 2021-02-11 ENCOUNTER — TRANSCRIPTION ENCOUNTER (OUTPATIENT)
Age: 71
End: 2021-02-11

## 2021-02-11 PROBLEM — I45.5 OTHER SPECIFIED HEART BLOCK: Chronic | Status: ACTIVE | Noted: 2021-02-10

## 2021-02-11 PROBLEM — I25.10 ATHEROSCLEROTIC HEART DISEASE OF NATIVE CORONARY ARTERY WITHOUT ANGINA PECTORIS: Chronic | Status: ACTIVE | Noted: 2021-02-10

## 2021-02-11 LAB
A1C WITH ESTIMATED AVERAGE GLUCOSE RESULT: 5.4 % — SIGNIFICANT CHANGE UP (ref 4–5.6)
ALBUMIN SERPL ELPH-MCNC: 3.7 G/DL — SIGNIFICANT CHANGE UP (ref 3.3–5.2)
ALP SERPL-CCNC: 111 U/L — SIGNIFICANT CHANGE UP (ref 40–120)
ALT FLD-CCNC: 22 U/L — SIGNIFICANT CHANGE UP
APTT BLD: 33.9 SEC — SIGNIFICANT CHANGE UP (ref 27.5–35.5)
APTT BLD: 64.1 SEC — HIGH (ref 27.5–35.5)
APTT BLD: 74.1 SEC — HIGH (ref 27.5–35.5)
AST SERPL-CCNC: 26 U/L — SIGNIFICANT CHANGE UP
BASE EXCESS BLDA CALC-SCNC: 0.9 MMOL/L — SIGNIFICANT CHANGE UP (ref -2–2)
BILIRUB DIRECT SERPL-MCNC: 0.1 MG/DL — SIGNIFICANT CHANGE UP (ref 0–0.3)
BILIRUB INDIRECT FLD-MCNC: 0.2 MG/DL — SIGNIFICANT CHANGE UP (ref 0.2–1)
BILIRUB SERPL-MCNC: 0.3 MG/DL — LOW (ref 0.4–2)
BLD GP AB SCN SERPL QL: SIGNIFICANT CHANGE UP
BLOOD GAS COMMENTS ARTERIAL: SIGNIFICANT CHANGE UP
ESTIMATED AVERAGE GLUCOSE: 108 MG/DL — SIGNIFICANT CHANGE UP (ref 68–114)
GAS PNL BLDA: SIGNIFICANT CHANGE UP
GLUCOSE BLDC GLUCOMTR-MCNC: 94 MG/DL — SIGNIFICANT CHANGE UP (ref 70–99)
HCO3 BLDA-SCNC: 25 MMOL/L — SIGNIFICANT CHANGE UP (ref 20–26)
HCT VFR BLD CALC: 46.1 % — SIGNIFICANT CHANGE UP (ref 39–50)
HCV AB S/CO SERPL IA: 0.1 S/CO — SIGNIFICANT CHANGE UP (ref 0–0.99)
HCV AB SERPL-IMP: SIGNIFICANT CHANGE UP
HGB BLD-MCNC: 15.4 G/DL — SIGNIFICANT CHANGE UP (ref 13–17)
HOROWITZ INDEX BLDA+IHG-RTO: 21 — SIGNIFICANT CHANGE UP
MCHC RBC-ENTMCNC: 29.2 PG — SIGNIFICANT CHANGE UP (ref 27–34)
MCHC RBC-ENTMCNC: 33.4 GM/DL — SIGNIFICANT CHANGE UP (ref 32–36)
MCV RBC AUTO: 87.5 FL — SIGNIFICANT CHANGE UP (ref 80–100)
PA ADP PRP-ACNC: 217 PRU — SIGNIFICANT CHANGE UP (ref 180–376)
PCO2 BLDA: 40 MMHG — SIGNIFICANT CHANGE UP (ref 35–45)
PH BLDA: 7.42 — SIGNIFICANT CHANGE UP (ref 7.35–7.45)
PLATELET # BLD AUTO: 213 K/UL — SIGNIFICANT CHANGE UP (ref 150–400)
PO2 BLDA: 87 MMHG — SIGNIFICANT CHANGE UP (ref 83–108)
PREALB SERPL-MCNC: 17 MG/DL — LOW (ref 18–38)
PROT SERPL-MCNC: 7.2 G/DL — SIGNIFICANT CHANGE UP (ref 6.6–8.7)
RBC # BLD: 5.27 M/UL — SIGNIFICANT CHANGE UP (ref 4.2–5.8)
RBC # FLD: 14 % — SIGNIFICANT CHANGE UP (ref 10.3–14.5)
SAO2 % BLDA: 97 % — SIGNIFICANT CHANGE UP (ref 95–99)
TSH SERPL-MCNC: <0.1 UIU/ML — LOW (ref 0.27–4.2)
WBC # BLD: 7.96 K/UL — SIGNIFICANT CHANGE UP (ref 3.8–10.5)
WBC # FLD AUTO: 7.96 K/UL — SIGNIFICANT CHANGE UP (ref 3.8–10.5)

## 2021-02-11 PROCEDURE — 99232 SBSQ HOSP IP/OBS MODERATE 35: CPT

## 2021-02-11 PROCEDURE — 93010 ELECTROCARDIOGRAM REPORT: CPT

## 2021-02-11 PROCEDURE — 99233 SBSQ HOSP IP/OBS HIGH 50: CPT

## 2021-02-11 RX ORDER — CEFUROXIME AXETIL 250 MG
1500 TABLET ORAL ONCE
Refills: 0 | Status: DISCONTINUED | OUTPATIENT
Start: 2021-02-11 | End: 2021-02-12

## 2021-02-11 RX ORDER — CEFUROXIME AXETIL 250 MG
1500 TABLET ORAL ONCE
Refills: 0 | Status: DISCONTINUED | OUTPATIENT
Start: 2021-02-11 | End: 2021-02-11

## 2021-02-11 RX ORDER — VANCOMYCIN HCL 1 G
1000 VIAL (EA) INTRAVENOUS ONCE
Refills: 0 | Status: DISCONTINUED | OUTPATIENT
Start: 2021-02-11 | End: 2021-02-12

## 2021-02-11 RX ORDER — CHLORHEXIDINE GLUCONATE 213 G/1000ML
1 SOLUTION TOPICAL
Refills: 0 | Status: DISCONTINUED | OUTPATIENT
Start: 2021-02-11 | End: 2021-02-12

## 2021-02-11 RX ADMIN — HEPARIN SODIUM 1200 UNIT(S)/HR: 5000 INJECTION INTRAVENOUS; SUBCUTANEOUS at 13:07

## 2021-02-11 RX ADMIN — CHLORHEXIDINE GLUCONATE 15 MILLILITER(S): 213 SOLUTION TOPICAL at 05:02

## 2021-02-11 RX ADMIN — HEPARIN SODIUM 1200 UNIT(S)/HR: 5000 INJECTION INTRAVENOUS; SUBCUTANEOUS at 20:50

## 2021-02-11 RX ADMIN — MUPIROCIN 1 APPLICATION(S): 20 OINTMENT TOPICAL at 17:50

## 2021-02-11 RX ADMIN — ATORVASTATIN CALCIUM 80 MILLIGRAM(S): 80 TABLET, FILM COATED ORAL at 22:01

## 2021-02-11 RX ADMIN — HEPARIN SODIUM 1300 UNIT(S)/HR: 5000 INJECTION INTRAVENOUS; SUBCUTANEOUS at 06:38

## 2021-02-11 RX ADMIN — LOSARTAN POTASSIUM 50 MILLIGRAM(S): 100 TABLET, FILM COATED ORAL at 05:02

## 2021-02-11 RX ADMIN — CHLORHEXIDINE GLUCONATE 1 APPLICATION(S): 213 SOLUTION TOPICAL at 16:51

## 2021-02-11 RX ADMIN — Medication 81 MILLIGRAM(S): at 11:12

## 2021-02-11 RX ADMIN — Medication 50 MILLIGRAM(S): at 05:03

## 2021-02-11 RX ADMIN — CHLORHEXIDINE GLUCONATE 15 MILLILITER(S): 213 SOLUTION TOPICAL at 17:50

## 2021-02-11 RX ADMIN — MUPIROCIN 1 APPLICATION(S): 20 OINTMENT TOPICAL at 05:02

## 2021-02-11 NOTE — PROGRESS NOTE ADULT - SUBJECTIVE AND OBJECTIVE BOX
[facility] - [facility]                                   [address] [place] [address]                                                      PHONE: [phone]                                                         FAX: [phone]  [email or web address]  ---------------------------------------------------------------------------------------------------------------------------------    Overnight events/patient complaints: patient seen at bedside. He denies chest pain or SOB.       No Known Allergies    MEDICATIONS  (STANDING):  aspirin enteric coated 81 milliGRAM(s) Oral daily  atorvastatin 80 milliGRAM(s) Oral at bedtime  chlorhexidine 0.12% Liquid 15 milliLiter(s) Swish and Spit two times a day  heparin  Infusion.  Unit(s)/Hr (10 mL/Hr) IV Continuous <Continuous>  influenza   Vaccine 0.5 milliLiter(s) IntraMuscular once  losartan 50 milliGRAM(s) Oral daily  metoprolol succinate ER 50 milliGRAM(s) Oral daily  mupirocin 2% Ointment 1 Application(s) Topical two times a day    MEDICATIONS  (PRN):  aluminum hydroxide/magnesium hydroxide/simethicone Suspension 30 milliLiter(s) Oral every 4 hours PRN Dyspepsia  heparin   Injectable 6000 Unit(s) IV Push every 6 hours PRN For aPTT less than 40  nitroglycerin     SubLingual 0.4 milliGRAM(s) SubLingual every 5 minutes PRN Chest Pain  ondansetron Injectable 4 milliGRAM(s) IV Push every 6 hours PRN Nausea      Vital Signs Last 24 Hrs  T(C): 36.8 (11 Feb 2021 05:00), Max: 36.9 (10 Feb 2021 15:29)  T(F): 98.2 (11 Feb 2021 05:00), Max: 98.5 (10 Feb 2021 15:29)  HR: 61 (11 Feb 2021 07:23) (61 - 85)  BP: 140/66 (11 Feb 2021 07:23) (128/60 - 180/72)  BP(mean): --  RR: 16 (11 Feb 2021 07:23) (16 - 18)  SpO2: 98% (11 Feb 2021 07:23) (97% - 99%)  ICU Vital Signs Last 24 Hrs  KIRSTEN SHAE  I&O's Detail    10 Feb 2021 07:01  -  11 Feb 2021 07:00  --------------------------------------------------------  IN:    Heparin Infusion: 98 mL    Oral Fluid: 720 mL  Total IN: 818 mL    OUT:    Voided (mL): 300 mL  Total OUT: 300 mL    Total NET: 518 mL        Drug Dosing Weight  KIRSTEN KAUFMAN      PHYSICAL EXAM:  General: Appears well developed, well nourished alert and cooperative.  HEENT: Head; normocephalic, atraumatic.  Eyes: Pupils reactive, cornea wnl.  Neck: Supple, no nodes adenopathy, no NVD or carotid bruit or thyromegaly.  CARDIOVASCULAR: Normal S1 and S2, No murmur, rub, gallop or lift.   LUNGS: No rales, rhonchi or wheeze. Normal breath sounds bilaterally.  ABDOMEN: Soft, nontender without mass or organomegaly. bowel sounds normoactive.  EXTREMITIES: No clubbing, cyanosis or edema. Distal pulses wnl.   SKIN: warm and dry with normal turgor.  NEURO: Alert/oriented x 3/normal motor exam. No pathologic reflexes.    PSYCH: normal affect.        LABS:                        15.4   7.96  )-----------( 213      ( 11 Feb 2021 05:35 )             46.1     02-10    137  |  102  |  21.0<H>  ----------------------------<  116<H>  4.2   |  25.0  |  0.72    Ca    9.6      10 Feb 2021 04:16  Phos  3.2     02-10  Mg     2.1     02-10    TPro  7.2  /  Alb  3.7  /  TBili  0.3<L>  /  DBili  0.1  /  AST  26  /  ALT  22  /  AlkPhos  111  02-11    KIRSTEN KAUFMAN  CARDIAC MARKERS ( 10 Feb 2021 10:09 )  x     / 0.11 ng/mL / 123 U/L / x     / x      CARDIAC MARKERS ( 10 Feb 2021 04:16 )  x     / 0.04 ng/mL / 70 U/L / x     / x      CARDIAC MARKERS ( 10 Feb 2021 00:57 )  x     / 0.02 ng/mL / x     / x     / x          PT/INR - ( 10 Feb 2021 04:16 )   PT: 12.4 sec;   INR: 1.07 ratio         PTT - ( 11 Feb 2021 05:35 )  PTT:33.9 sec      RADIOLOGY & ADDITIONAL STUDIES:    INTERPRETATION OF TELEMETRY (personally reviewed): sinus rhythm     CARDIAC CATHETERIZATION 2/10/21  The coronary circulation is right dominant.  LM:   --  Proximal left main: Angiography showed minor luminal  irregularities with no flow limiting lesions.  LAD:   --  Proximal LAD: There was a 70 % stenosis.  --  Mid LAD: There was a 20 % stenosis at the site of a prior stent.  --  Distal LAD: There was a 100 % stenosis. The distal LAD is occluded at  the distal stent edge and fills via collateral flow from septal branches.  CX:   --  Proximal circumflex: Angiography showed minor luminal  irregularities with no flow limiting lesions.  --  Mid circumflex: Angiography showed minor luminal irregularities with no  flow limiting lesions.  --  Distal circumflex: Angiography showed minor luminal irregularities with  no flow limiting lesions.  RI:   --  Proximal ramus intermedius: There was a 70 % stenosis.  RCA:   --  Proximal RCA: Angiography showed minor luminal irregularities  with no flow limiting lesions.  --  Mid RCA: There was a 90 % stenosis at the site of a prior stent.  --  Distal RCA: There was a 80 % stenosis at the site of a prior stent.  --  RPDA: There was a 30 % stenosis.  --  RPLS: There was a 70 % stenosis.      ASSESSMENT AND PLAN:  In summary, KIRSTEN KAUFMAN is an 70y Male with past medical history significant for CAD/MI remote PCI to LAD/RCA lasted cardiac cath Prior stent in LAD is patent with 100% occluded RCA in which RCA was treated with thrombectomy/PTCA/STENT (STEPHAN-[facility]) good results, HTN, HLD, Tobacco Abuse who presents [facility] ER c/o left sided Chest Pain which began acutely the morning at rest and was relieved at that time by ASA 325mg PO but returned later in the day.     NSTEMI, Severe 3V CAD, PCI, Smoker, HTN, HLD -- Cath shows severe multivessel CAD with LAD in-stent re-stenosis.  - continue Heparin infusion for ACS protocol  - continue ASA 81 mg and high dose statin therapy  - continue Toprol XL 50 mg and Losartan 50 mg daily  - continue telemetry  - plan is for CABG -- f/u CT surgery recommendations for timing of surgery    Will follow closely with you.

## 2021-02-11 NOTE — PROGRESS NOTE ADULT - SUBJECTIVE AND OBJECTIVE BOX
Patient is a 70y old  Male who presents with a chief complaint of Chest Pain (11 Feb 2021 10:21) s/p [facility] with severe MVD       HEALTH ISSUES - PROBLEM Dx:  Pure hypercholesterolemia  HTN (hypertension)  CAD in native artery  chest pain       INTERVAL HPI/OVERNIGHT EVENTS:  Patient seen and examined at bedside. No acute events overnight. Patient states he is feeling well post [facility], they had gone through the wrist and he reports that the site is doing well, minimal pain and no noted other abnormalities. Pt states he is aware that he needs Sx intervention and is amenable if that is what is being recommended, his brother and father had the same procedure but years ago. He does have questions he wants to ask the ct sx team but aside from that he understands the plan of care and need for heparin ggt. No further episodes of chest pain overnight or this am. Patient denies SOB, abd pain, N/V, fever, chills, dysuria or any other complaints. All remainder ROS negative.     Vital Signs Last 24 Hrs  T(C): 36.9 (11 Feb 2021 10:57), Max: 36.9 (10 Feb 2021 15:29)  T(F): 98.5 (11 Feb 2021 10:57), Max: 98.5 (10 Feb 2021 15:29)  HR: 68 (11 Feb 2021 10:57) (61 - 85)  BP: 158/82 (11 Feb 2021 10:57) (128/60 - 180/72)  BP(mean): --  RR: 18 (11 Feb 2021 10:57) (16 - 18)  SpO2: 97% (11 Feb 2021 10:57) (97% - 98%)      I&O's Summary    10 Feb 2021 07:01  -  11 Feb 2021 07:00  --------------------------------------------------------  IN: 818 mL / OUT: 300 mL / NET: 518 mL        CONSTITUTIONAL: Obese Well appearing, well nourished, awake, alert and in no apparent distress  CARDIAC: Normal rate, regular rhythm.  Heart sounds S1, S2.  No murmurs, rubs or gallops   RESPIRATORY: Breath sounds clear and equal bilaterally. No wheezes, rhales or rhonchi  GASTROENTEROLOGY: Soft nt nd bs +normoactive   EXTREMITIES: No edema, cyanosis or deformity   R wrist with ecchymosis but otherwise no noted abnormality   NEUROLOGICAL: Alert and oriented, no focal deficits   SKIN: No rash, skin turgor wnl     MEDICATIONS  (STANDING):  aspirin enteric coated 81 milliGRAM(s) Oral daily  atorvastatin 80 milliGRAM(s) Oral at bedtime  chlorhexidine 0.12% Liquid 15 milliLiter(s) Swish and Spit two times a day  heparin  Infusion.  Unit(s)/Hr (10 mL/Hr) IV Continuous <Continuous>  influenza   Vaccine 0.5 milliLiter(s) IntraMuscular once  losartan 50 milliGRAM(s) Oral daily  metoprolol succinate ER 50 milliGRAM(s) Oral daily  mupirocin 2% Ointment 1 Application(s) Topical two times a day    MEDICATIONS  (PRN):  aluminum hydroxide/magnesium hydroxide/simethicone Suspension 30 milliLiter(s) Oral every 4 hours PRN Dyspepsia  heparin   Injectable 6000 Unit(s) IV Push every 6 hours PRN For aPTT less than 40  nitroglycerin     SubLingual 0.4 milliGRAM(s) SubLingual every 5 minutes PRN Chest Pain  ondansetron Injectable 4 milliGRAM(s) IV Push every 6 hours PRN Nausea      LABS:                        15.4   7.96  )-----------( 213      ( 11 Feb 2021 05:35 )             46.1     02-10    137  |  102  |  21.0<H>  ----------------------------<  116<H>  4.2   |  25.0  |  0.72    Ca    9.6      10 Feb 2021 04:16  Phos  3.2     02-10  Mg     2.1     02-10    TPro  7.2  /  Alb  3.7  /  TBili  0.3<L>  /  DBili  0.1  /  AST  26  /  ALT  22  /  AlkPhos  111  02-11    PT/INR - ( 10 Feb 2021 04:16 )   PT: 12.4 sec;   INR: 1.07 ratio         PTT - ( 11 Feb 2021 12:24 )  PTT:74.1 sec    LIVER FUNCTIONS - ( 11 Feb 2021 05:34 )  Alb: 3.7 g/dL / Pro: 7.2 g/dL / ALK PHOS: 111 U/L / ALT: 22 U/L / AST: 26 U/L / GGT: x             RADIOLOGY & ADDITIONAL TESTS:  S/p LHC:   INTERVENTIONAL IMPRESSIONS: Severe in stent restenosis of the mid and  distal RCA  70% proximal LAD stenosis  Occluded distal LAD that fills via septal collaterals.  70% ostial ramus stenosis

## 2021-02-11 NOTE — PROGRESS NOTE ADULT - SUBJECTIVE AND OBJECTIVE BOX
Cardiac Surgery Pre-op Note:                                                                                                           Surgeon: Dr. Izquierdo    Procedure: CABG    Patient is a 70y old Male who presents with a chief complaint of Chest Pain (11 Feb 2021 14:36)    HPI:  69 y/o M with PMHX CAD/MI s/p PCI x9, HTN, HLD, Hx Sinus Pauses, Tobacco Abuse presents to [facility] ER c/o L sided Chest Pain which began acutely the morning PTA at rest and was relieved at that time by ASA 325mg PO but returned later in the day. CP radiates from L hand and across chest. Similar in description to CP 2 years ago for which he had stents placed. Relieved with SL Nitro 0.4mg x1 in ER. +associated Palpitations. No diaphoresis. No associated SOB/BARRERA. Denies cough. Active Smoker but quit smoking 3 days ago. Has not seen Cardio for past 2 years. Compliant with medications. Plavix dc'd by Cardio last post-PCI. +Hx prior PCI to RCA, LAD, and Diag with repeat RCA occlusion in 2018 s/p 2 more PCI STEPHAN (total 9 stents per patient).  (10 Feb 2021 03:06)    PAST MEDICAL & SURGICAL HISTORY:  CAD in native artery  Sinus pause  HLD (hyperlipidemia)  HTN (hypertension)  Stented coronary artery x 9    FAMILY HISTORY:  No pertinent family history in first degree relatives    Subjective: Patient sitting in chair at bedside in no acute distress. All questions answered. Ambulates independently. Nervous for surgery tomorrow. 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 Denies fevers, chills, lightheadedness, dizziness, HA, CP, palpitations, SOB, cough, abdominal pain, N/V, diarrhea, numbness/tingling in extremities, or any other acute complaints. ROS negative x 10 systems except as noted above.    MEDICATIONS  (STANDING):  aspirin enteric coated 81 milliGRAM(s) Oral daily  atorvastatin 80 milliGRAM(s) Oral at bedtime  cefuroxime  IVPB 1500 milliGRAM(s) IV Intermittent once  chlorhexidine 0.12% Liquid 15 milliLiter(s) Swish and Spit two times a day  chlorhexidine 4% Liquid 1 Application(s) Topical two times a day  heparin  Infusion.  Unit(s)/Hr (10 mL/Hr) IV Continuous   influenza   Vaccine 0.5 milliLiter(s) IntraMuscular once  metoprolol succinate ER 50 milliGRAM(s) Oral daily  mupirocin 2% Ointment 1 Application(s) Topical two times a day  vancomycin  IVPB 1000 milliGRAM(s) IV Intermittent once    MEDICATIONS  (PRN):  aluminum hydroxide/magnesium hydroxide/simethicone Suspension 30 milliLiter(s) Oral every 4 hours PRN Dyspepsia  heparin   Injectable 6000 Unit(s) IV Push every 6 hours PRN For aPTT less than 40  nitroglycerin     SubLingual 0.4 milliGRAM(s) SubLingual every 5 minutes PRN Chest Pain  ondansetron Injectable 4 milliGRAM(s) IV Push every 6 hours PRN Nausea    Allergies: No Known Allergies    Vitals   T(C): 36.9 (12 Feb 2021 00:54), Max: 36.9 (11 Feb 2021 10:57)  T(F): 98.4 (12 Feb 2021 00:54), Max: 98.5 (11 Feb 2021 10:57)  HR: 61 (12 Feb 2021 00:00) (61 - 72)  BP: 144/67 (12 Feb 2021 00:00) (128/60 - 158/82)  BP(mean): 96 (12 Feb 2021 00:00) (96 - 108)  RR: 16 (12 Feb 2021 00:00) (16 - 22)  SpO2: 95% (12 Feb 2021 00:00) (95% - 99%)    I&O's Detail    10 Feb 2021 07:01  -  11 Feb 2021 07:00  --------------------------------------------------------  IN:    Heparin Infusion: 98 mL    Oral Fluid: 720 mL  Total IN: 818 mL    OUT:    Voided (mL): 300 mL  Total OUT: 300 mL    Total NET: 518 mL      11 Feb 2021 07:01  -  12 Feb 2021 01:43  --------------------------------------------------------  IN:    Heparin Infusion: 48 mL  Total IN: 48 mL    OUT:  Total OUT: 0 mL    Total NET: 48 mL    Physical Exam  Neuro: A+O x 3, non-focal, speech clear and intact  HEENT:  NCAT, PERRL, EOMI. No conjuctival edema or icterus, no thrush.    Neck:  Supple, trachea midline  Pulm: CTA, good air entry, equal bilaterally, no rales/rhonchi/wheezing, no accessory muscle use noted  CV: regular rate, regular rhythm, +S1S2, no murmur or rub noted  Abd: soft, NT, ND, + BS  Ext: DODSON x 4, +1 LE pitting edema b/l, no cyanosis or clubbing, distal motor/neuro/circ intact  Skin: warm, dry, well perfused      LABS:                        15.4   7.96  )-----------( 213      ( 11 Feb 2021 05:35 )             46.1     02-10    137  |  102  |  21.0<H>  ----------------------------<  116<H>  4.2   |  25.0  |  0.72    Ca    9.6      10 Feb 2021 04:16  Phos  3.2     02-10  Mg     2.1     02-10    TPro  7.2  /  Alb  3.7  /  TBili  0.3<L>  /  DBili  0.1  /  AST  26  /  ALT  22  /  AlkPhos  111  02-11    PT/INR - ( 10 Feb 2021 04:16 )   PT: 12.4 sec;   INR: 1.07 ratio       PTT - ( 11 Feb 2021 19:18 )  PTT:64.1 sec    LIVER FUNCTIONS - ( 11 Feb 2021 05:34 )  Alb: 3.7 g/dL / Pro: 7.2 g/dL / ALK PHOS: 111 U/L / ALT: 22 U/L / AST: 26 U/L / GGT: x           Thyroid Stimulating Hormone, Serum: <0.10 uIU/mL (02-11 @ 13:26)    Prealbumin, Serum: 17 mg/dL (02-11 @ 05:34)    MRSA PCR Result.: NotDetec (02-11 @ 18:07)      CXR:  < from: Xray Chest 1 View- PORTABLE-Urgent (Xray Chest 1 View- PORTABLE-Urgent .) (02.10.21 @ 00:48) >  Heart magnified by technique.  The lung fields and pleural surfaces are unremarkable.  Chest is similar to July 26, 2018.  IMPRESSION: No acute finding or change.  < end of copied text >    EKG:  < from: 12 Lead ECG (02.10.21 @ 08:24) >  Ventricular Rate 70 BPM  Atrial Rate 70 BPM  P-R Interval 188 ms  QRS Duration 148 ms  Q-T Interval 444 ms  QTC Calculation(Bazett) 479 ms  P Axis 53 degrees  R Axis 54 degrees  T Axis -7 degrees  Diagnosis Line Normal sinus rhythm  Right bundle branch block  Inferior infarct , age undetermined  < end of copied text >    Carotid Duplex:  < from:  Duplex Carotid Arteries Complete, Bilateral (02.10.21 @ 21:40) >  IMPRESSION:  No hemodynamically significant stenosis in the visualized internal carotid arteries. Elevated peak systolic velocity of the left external carotid artery.  < end of copied text >    PFT's: Printed in chart    Room air ABG:  ABG - ( 11 Feb 2021 16:29 )  pH, Arterial: 7.42  pH, Blood: x     /  pCO2: 40    /  pO2: 87    / HCO3: 25    / Base Excess: 0.9   /  SaO2: 97          Echo:  < from: TTE Echo Complete w/ Contrast w/ Doppler (02.10.21 @ 19:57) >  Summary:   1. Technically difficult study.   2. Endocardial visualization was enhanced with intravenous echo contrast.   3. Left ventricular ejection fraction, by visual estimation, is 55 to 60%.   4. Normal global left ventricular systolic function.   5. Basal and mid inferior wall is abnormal as describedabove.   6. Spectral Doppler shows impaired relaxation pattern of left ventricular myocardial filling (Grade I diastolic dysfunction).   7. Normal left ventricular internal cavity size.   8. Normal right ventricular size and function.   9. Normal left atrial size.  10. Normal right atrial size.  11. No evidence of mitral valve regurgitation.  12. Normal trileaflet aortic valve with normal opening.  13. There is no evidence of pericardial effusion.  < end of copied text >      Cath report:  < from: Cardiac Cath Lab - Adult (02.10.21 @ 17:00) >  VENTRICLES: Global left ventricular function was normal. EF calculated by contrast ventriculography was 55 %.  CORONARY VESSELS: The coronary circulation is right dominant.  LM:   --  Proximal left main: Angiography showed minor luminal irregularities with no flow limiting lesions.  LAD:   --  Proximal LAD: There was a 70% stenosis.  --  Mid LAD: There was a 20 % stenosis at the site of a prior stent.  --  Distal LAD: There was a 100 % stenosis. The distal LAD is occluded at the distal stent edge and fills via collateral flow from septal branches.  CX:   --  Proximal circumflex: Angiography showed minor luminal irregularities with no flow limiting lesions.  --  Mid circumflex: Angiography showed minor luminal irregularities with no flow limiting lesions.  --  Distal circumflex: Angiography showed minor luminal irregularities with no flow limiting lesions.  RI:   --  Proximal ramus intermedius: There was a 70 % stenosis.  RCA:   --  Proximal RCA: Angiography showed minor luminal irregularities with no flow limiting lesions.  --  Mid RCA: There was a 90 % stenosis at the site of a prior stent.  --  Distal RCA: There was a 80 % stenosis at the site of a prior stent.  --  RPDA: There was a 30 % stenosis.  --  RPLS: There was a 70 % stenosis.  COMPLICATIONS: There were no complications. No complications occurred during the cath lab visit.  DIAGNOSTIC IMPRESSIONS: Severe in stent restenosis of the mid and distal RCA 70% proximal LAD stenosis. Occluded distal LAD that fills via septal collaterals. 70% ostial ramus stenosis  DIAGNOSTIC RECOMMENDATIONS: CT surgery evaluation for CABG. Heparin drip after TR band is removed. Continue aspirin. High dose statin.  < end of copied text >      Pt has AICD/PPM [ ] Yes  [X] No   Pre-op Beta Blocker ordered within 24 hrs of surgery?  [X] Yes  [ ] No  If not, Why?  Pre-op Hibiclens & Peridex (BID x 2 days preferred) [X] Yes  [ ] No  Type & Cross  [X] Yes  [ ] No  NPO after Midnight [X] Yes  [ ] No  Pre-op ABX ordered, to be taped on chart:  [X] Yes  [ ] No   ( Vanco only if Anaphylaxis, or MRSA)  Consent obtained  [ ] Yes  [ ] No (To be obtained at bedside with surgeon)

## 2021-02-11 NOTE — PROGRESS NOTE ADULT - SUBJECTIVE AND OBJECTIVE BOX
[place] [facility]                                                       [facility]                                                        Office: [address] [address]                                                       Telephone: [phone]. Fax:[phone]                                                                             PROGRESS NOTE   Reason for follow up:                             Overnight: No new events.   Update:     Subjective: "  _I feel good __________"   Complains of:   Review of symptoms: Cardiac:  No chest pain. No dyspnea. No palpitations.  Respiratory:no cough. No dyspnea  Gastrointestinal: No diarrhea. No abdominal pain. No bleeding.     Past medical history: No updates.   Chronic conditions:  Hypertension: controlled. CHF: Stable. CAD: Stable ischemic heart disease. DM: Stable;    	  Vitals:  T(C): 36.8 (02-11-21 @ 05:00), Max: 36.9 (02-10-21 @ 15:29)  HR: 61 (02-11-21 @ 07:23) (61 - 85)  BP: 140/66 (02-11-21 @ 07:23) (128/60 - 180/72)  RR: 16 (02-11-21 @ 07:23) (16 - 18)  SpO2: 98% (02-11-21 @ 07:23) (97% - 99%)      PHYSICAL EXAM:  Appearance: Comfortable. No acute distress  HEENT:  Head and neck: Atraumatic. Normocephalic.  Normal oral mucosa, PERRL, Neck is supple. No JVD, No carotid bruit.   Neurologic: A & O x 3, no focal deficits. EOMI , Cranial nerves are intact.  Lymphatic: No cervical lymphadenopathy  Cardiovascular: Normal S1 S2, No murmur, rubs/gallops. No JVD, No edema  R radial cath site fingers warm and mobile  Respiratory: Lungs clear to auscultation  Gastrointestinal:  Soft, Non-tender, + BS  Lower Extremities: No edema  Psychiatry: Patient is calm. No agitation. Mood & affect appropriate  Skin: No rashes/ ecchymoses/cyanosis/ulcers visualized on the face, hands or feet.    CURRENT MEDICATIONS:  losartan 50 milliGRAM(s) Oral daily  metoprolol succinate ER 50 milliGRAM(s) Oral daily  nitroglycerin     SubLingual 0.4 milliGRAM(s) SubLingual every 5 minutes PRN  atorvastatin  aspirin enteric coated  chlorhexidine 0.12% Liquid  heparin  Infusion.  influenza   Vaccine  mupirocin 2% Ointment      LABS:	 	  CARDIAC MARKERS ( 10 Feb 2021 10:09 )  x     / 0.11 ng/mL / 123 U/L / x     / x      p-BNP 10 Feb 2021 10:09: x    , CARDIAC MARKERS ( 10 Feb 2021 04:16 )  x     / 0.04 ng/mL / 70 U/L / x     / x      p-BNP 10 Feb 2021 04:16: x    , CARDIAC MARKERS ( 10 Feb 2021 00:57 )  x     / 0.02 ng/mL / x     / x     / x      p-BNP 10 Feb 2021 00:57: 202 pg/mL                            15.4   7.96  )-----------( 213      ( 11 Feb 2021 05:35 )             46.1     02-10    137  |  102  |  21.0<H>  ----------------------------<  116<H>  4.2   |  25.0  |  0.72    Ca    9.6      10 Feb 2021 04:16  Phos  3.2     02-10  Mg     2.1     02-10    TPro  7.2  /  Alb  3.7  /  TBili  0.3<L>  /  DBili  0.1  /  AST  26  /  ALT  22  /  AlkPhos  111  02-11    proBNP: Serum Pro-Brain Natriuretic Peptide: 202 pg/mL (02-10 @ 00:57)    Lipid Profile: Date: 02-10 @ 04:16  Total cholesterol 134; Direct LDL: --; HDL: 39; Triglycerides:75    HgA1c: TSH: Thyroid Stimulating Hormone, Serum: <0.10 uIU/mL      TELEMETRY: Reviewed    ECG:  Reviewed by me. 	    DIAGNOSTIC TESTING:  [ ] Echocardiogram:   [ ]  Catheterization:  [ ] Stress Test:    OTHER: 	                                                                [place] [facility]                                                       [facility]                                                        Office: [address] [address]                                                       Telephone: [phone]. Fax:[phone]                                                                             PROGRESS NOTE   Reason for follow up:                             Overnight: No new events.   Update: Pt s/p Cardiac cath done via RRA revealed severe multi-vessel disease and need for CABG     Subjective: "  _I feel good __________"   Complains of:   Review of symptoms: Cardiac:  No chest pain. No dyspnea. No palpitations.  Respiratory:no cough. No dyspnea  Gastrointestinal: No diarrhea. No abdominal pain. No bleeding.     Past medical history: No updates.   Chronic conditions:  Hypertension: controlled. CHF: Stable. CAD: Stable ischemic heart disease. DM: Stable;    	  Vitals:  T(C): 36.8 (02-11-21 @ 05:00), Max: 36.9 (02-10-21 @ 15:29)  HR: 61 (02-11-21 @ 07:23) (61 - 85)  BP: 140/66 (02-11-21 @ 07:23) (128/60 - 180/72)  RR: 16 (02-11-21 @ 07:23) (16 - 18)  SpO2: 98% (02-11-21 @ 07:23) (97% - 99%)      PHYSICAL EXAM:  Appearance: Comfortable. No acute distress  HEENT:  Head and neck: Atraumatic. Normocephalic.  Normal oral mucosa, PERRL, Neck is supple. No JVD, No carotid bruit.   Neurologic: A & O x 3, no focal deficits. EOMI , Cranial nerves are intact.  Lymphatic: No cervical lymphadenopathy  Cardiovascular: Normal S1 S2, No murmur, rubs/gallops. No JVD, No edema  R radial cath site fingers warm and mobile  Respiratory: Lungs clear to auscultation  Gastrointestinal:  Soft, Non-tender, + BS  Lower Extremities: No edema  Psychiatry: Patient is calm. No agitation. Mood & affect appropriate  Skin: No rashes/ ecchymoses/cyanosis/ulcers visualized on the face, hands or feet.    CURRENT MEDICATIONS:  losartan 50 milliGRAM(s) Oral daily  metoprolol succinate ER 50 milliGRAM(s) Oral daily  nitroglycerin     SubLingual 0.4 milliGRAM(s) SubLingual every 5 minutes PRN  atorvastatin  aspirin enteric coated  chlorhexidine 0.12% Liquid  heparin  Infusion drip   influenza   Vaccine  mupirocin 2% Ointment      LABS:	 	  CARDIAC MARKERS ( 10 Feb 2021 10:09 )  x     / 0.11 ng/mL / 123 U/L / x     / x      p-BNP 10 Feb 2021 10:09: x    , CARDIAC MARKERS ( 10 Feb 2021 04:16 )  x     / 0.04 ng/mL / 70 U/L / x     / x      p-BNP 10 Feb 2021 04:16: x    , CARDIAC MARKERS ( 10 Feb 2021 00:57 )  x     / 0.02 ng/mL / x     / x     / x      p-BNP 10 Feb 2021 00:57: 202 pg/mL                            15.4   7.96  )-----------( 213      ( 11 Feb 2021 05:35 )             46.1     02-10    137  |  102  |  21.0<H>  ----------------------------<  116<H>  4.2   |  25.0  |  0.72    Ca    9.6      10 Feb 2021 04:16  Phos  3.2     02-10  Mg     2.1     02-10    TPro  7.2  /  Alb  3.7  /  TBili  0.3<L>  /  DBili  0.1  /  AST  26  /  ALT  22  /  AlkPhos  111  02-11    proBNP: Serum Pro-Brain Natriuretic Peptide: 202 pg/mL (02-10 @ 00:57)    Lipid Profile: Date: 02-10 @ 04:16  Total cholesterol 134; Direct LDL: --; HDL: 39; Triglycerides:75    HgA1c: TSH: Thyroid Stimulating Hormone, Serum: <0.10 uIU/mL      TELEMETRY: Reviewed  NSR Rate 60 - 80 no ectopy overnight   ECG:  Reviewed by me. 	    DIAGNOSTIC TESTING:  [ ] Echocardiogram:    Cardiac Cath Lab - Adult (02.10.21   VENTRICLES: Global left ventricular function was normal. EF calculated by  contrast ventriculography was 55 %.  CORONARY VESSELS: The coronary circulation is right dominant.  Proximal left main: Angiography showed minor luminal  irregularities with no flow limiting lesions.   Proximal LAD: There was a 70% stenosis.  Mid LAD: There was a 20 % stenosis at the site of a prior stent.  Distal LAD: There was a 100 % stenosis. The distal LAD is occluded at  the distal stent edge and fills via collateral flow from septal branches.  Proximal circumflex: Angiography showed minor luminal  irregularities with no flow limiting lesions.  Mid circumflex: Angiography showed minor luminal irregularities with no flow limiting lesions.   Distal circumflex: Angiography showed minor luminal irregularities withno flow limiting lesions.  Proximal ramus intermedius: There was a 70 % stenosis.  Proximal RCA: Angiography showed minor luminal irregularities with no flow limiting lesions.  Mid RCA: There was a 90 % stenosis at the site of a prior stent.  Distal RCA: There was a 80 % stenosis at the site of a prior stent.  RPDA: There was a 30 % stenosis.  RPLS: There was a 70 % stenosis.  COMPLICATIONS: There were no complications. No complications occurred during the cath lab visit.  DIAGNOSTIC IMPRESSIONS: Severe in stent restenosis of the mid and distal RCA  70% proximal LAD stenosis  Occluded distal LAD that fills via septal collaterals.  70% ostial ramus stenosis  DIAGNOSTIC RECOMMENDATIONS: CT surgery evaluation for CABG  Heparin drip after TR band is removed  Continue aspirin  High dose statin  INTERVENTIONAL IMPRESSIONS:   Severe in stent restenosis of the mid and distal RCA  70% proximal LAD stenosis Occluded distal LAD that fills via septal collaterals.  70% ostial ramus stenosis

## 2021-02-11 NOTE — PROGRESS NOTE ADULT - ATTENDING COMMENTS
Chart reviewed.  Pre-OP w/u reviewed  Risk, benefits and alternatives d/w patient  OR 2/12/2021 for CABG  No changes to report

## 2021-02-11 NOTE — PROGRESS NOTE ADULT - ASSESSMENT
71 y/o M with PMHX CAD/MI s/p PCI x9, HTN, HLD, Hx Sinus Pauses, Tobacco Abuse presents to [facility] ER c/o L sided Chest Pain which began acutely the morning PTA at rest and was relieved at that time by ASA 325mg PO but returned later in the day. CP radiates from L hand and across chest. Similar in description to CP 2 years ago for which he had stents placed. Relieved with SL Nitro 0.4mg x1 in ER. +associated Palpitations. No diaphoresis. No associated SOB/BARRERA. Denies cough. Active Smoker but quit smoking 3 days ago. Has not seen Cardio for past 2 years. Compliant with medications. Plavix dc'd by Cardio last post-PCI. +Hx prior PCI to RCA, LAD, and Diag with repeat RCA occlusion in 2018 s/p 2 more PCI STEPHAN (total 9 stents per patient).  now s/p LHC revealing severe multivessel disease done via RRA   Overnight no events no chest pain or SOB     ASSESSMENT/PLAN:    Severe multivessel Coronary artery disease  -CTS consulted - Dr Evans  - pre- op workup started   - Maintained on Heparin drip at presents   _ pre-op teaching started   - wrist precautions

## 2021-02-11 NOTE — PROGRESS NOTE ADULT - ASSESSMENT
70 year old Male with a medical history of CAD s/p prior PCI to RCA, LAD, and diag, presented with symptomatic chest pain.  +Multivessel CAD found on cardiac cath 2/10/21. Plan for CABG with Dr. Izquierdo tomorrow 2/12/21.

## 2021-02-11 NOTE — PROGRESS NOTE ADULT - ASSESSMENT
A/p: 70 y.o. Male with hx of CAD/MI remote PCI to LAD/RCA lasted cardiac cath Prior stent in LAD is patent with 100% occluded RCA in which RCA was treated with thrombectomy/PTCA/STENT (STEPHAN-Resolute), HTN, HLD, Tobacco dependence; who presents with Chest Pain, received relief with initial asa/ nitro on admission. Pt admitted with chest pain concern for ACS.  Troponin elevation to 0.11, NSTEMI and s/p LHC on 2/10 with noted multi vessel dz as well as in stent restenosis. Currently pt is being evaluated by ct sx for CABG, remains on heparin ggt post cath.  Aside from this pt had htn urgency with improvement in bp, anti hypertensives were optimized and up titrated with improvement in bp. Slow clinical improvement pending ct sx intervention and recommendations.     Severe CAD  -multi vessel dz   - s/p LHC with noted severe in stent restenosis of the mid and distal RCA, 70% proximal LAD stenosis, Occluded distal LAD that fills via septal collaterals. 70% ostial ramus stenosis  - no further episodes of chest pain overnight   - R wrist site wnl   - first two troponin were negative now troponin up trended to 0.11   - EKG NSR  RBBB   -c/w asa, troprol and atorvastatin increased to high intensity   -TTE with ef: 55-60%  grade 1 DD   - cardio f/u noted and appreciated   - ct sx consult noted and appreciated, awaiting further recommendations in regards to CABG       HTN urgency   - resolved   - bp has remained stable   - c/w Losartan  50 mg daily   - c/w Toprol XL 50 mg daily   - c/w ASA po qd   -cardio f/u noted and appreciated     Low TSH   - pt with no symptoms   - f/u free t3/ free t4 currently pending for further evaluation     HLD  -c/w atorvastatin po qhs     Obesity- BMI: 32   -Counselled on lifestyle diet modifications     Dvt ppx   - currently pt remains on heparin ggt     Activity level: Increase as tolerated     Dispo: Pt being evaluated by the ct sx team for CABG. Will await further recommendations. Pt is from home and at baseline independent in ADLs.    A/p: 70 y.o. Male with hx of CAD/MI remote PCI to LAD/RCA lasted cardiac cath Prior stent in LAD is patent with 100% occluded RCA in which RCA was treated with thrombectomy/PTCA/STENT (STEPHAN-Resolute), HTN, HLD, Tobacco dependence; who presents with Chest Pain, received relief with initial asa/ nitro on admission. Pt admitted with chest pain concern for ACS.  Troponin elevation to 0.11, NSTEMI and s/p LHC on 2/10 with noted multi vessel dz as well as in stent restenosis. Currently pt is being evaluated by ct sx for CABG, remains on heparin ggt post cath.  Aside from this pt had htn urgency with improvement in bp, anti hypertensives were optimized and up titrated with improvement in bp. Slow clinical improvement pending ct sx intervention and recommendations.     Severe CAD  -multi vessel dz   - s/p LHC with noted severe in stent restenosis of the mid and distal RCA, 70% proximal LAD stenosis, Occluded distal LAD that fills via septal collaterals. 70% ostial ramus stenosis  - no further episodes of chest pain overnight   - R wrist site wnl   - first two troponin were negative now troponin up trended to 0.11   - EKG NSR  RBBB   -c/w asa, troprol and atorvastatin increased to high intensity   -TTE with ef: 55-60%  grade 1 DD   - cardio f/u noted and appreciated   - ct sx consult noted and appreciated, awaiting further recommendations in regards to CABG       HTN urgency   - resolved   - bp has remained stable   - c/w Losartan  50 mg daily   - c/w Toprol XL 50 mg daily   - c/w ASA po qd   -cardio f/u noted and appreciated     Low TSH   - pt with no symptoms   - f/u free t3/ free t4 currently pending for further evaluation     HLD  -c/w atorvastatin po qhs     Tobacco dependence  - pt does not want nicotine replacement therapy   - smoking cessation counselling completed     Obesity- BMI: 32   -Counselled on lifestyle diet modifications     Dvt ppx   - currently pt remains on heparin ggt     Activity level: Increase as tolerated     Dispo: Pt being evaluated by the ct sx team for CABG. Will await further recommendations. Pt is from home and at baseline independent in ADLs.

## 2021-02-12 ENCOUNTER — APPOINTMENT (OUTPATIENT)
Dept: CARDIOTHORACIC SURGERY | Facility: HOSPITAL | Age: 71
End: 2021-02-12
Payer: MEDICAID

## 2021-02-12 ENCOUNTER — NON-APPOINTMENT (OUTPATIENT)
Age: 71
End: 2021-02-12

## 2021-02-12 DIAGNOSIS — R79.89 OTHER SPECIFIED ABNORMAL FINDINGS OF BLOOD CHEMISTRY: ICD-10-CM

## 2021-02-12 DIAGNOSIS — Z29.9 ENCOUNTER FOR PROPHYLACTIC MEASURES, UNSPECIFIED: ICD-10-CM

## 2021-02-12 LAB
ALBUMIN SERPL ELPH-MCNC: 3.3 G/DL — SIGNIFICANT CHANGE UP (ref 3.3–5.2)
ALP SERPL-CCNC: 71 U/L — SIGNIFICANT CHANGE UP (ref 40–120)
ALT FLD-CCNC: 18 U/L — SIGNIFICANT CHANGE UP
ANION GAP SERPL CALC-SCNC: 10 MMOL/L — SIGNIFICANT CHANGE UP (ref 5–17)
ANION GAP SERPL CALC-SCNC: 14 MMOL/L — SIGNIFICANT CHANGE UP (ref 5–17)
APTT BLD: 30.2 SEC — SIGNIFICANT CHANGE UP (ref 27.5–35.5)
AST SERPL-CCNC: 63 U/L — HIGH
BASE EXCESS BLDV CALC-SCNC: -1 MMOL/L — SIGNIFICANT CHANGE UP (ref -2–2)
BASOPHILS # BLD AUTO: 0.08 K/UL — SIGNIFICANT CHANGE UP (ref 0–0.2)
BASOPHILS NFR BLD AUTO: 1 % — SIGNIFICANT CHANGE UP (ref 0–2)
BILIRUB SERPL-MCNC: 0.6 MG/DL — SIGNIFICANT CHANGE UP (ref 0.4–2)
BLOOD GAS COMMENTS, VENOUS: SIGNIFICANT CHANGE UP
BUN SERPL-MCNC: 17 MG/DL — SIGNIFICANT CHANGE UP (ref 8–20)
BUN SERPL-MCNC: 21 MG/DL — HIGH (ref 8–20)
CA-I SERPL-SCNC: 1.19 MMOL/L — SIGNIFICANT CHANGE UP (ref 1.15–1.33)
CALCIUM SERPL-MCNC: 8.6 MG/DL — SIGNIFICANT CHANGE UP (ref 8.6–10.2)
CALCIUM SERPL-MCNC: 9.3 MG/DL — SIGNIFICANT CHANGE UP (ref 8.6–10.2)
CHLORIDE BLDV-SCNC: 107 MMOL/L — SIGNIFICANT CHANGE UP (ref 98–107)
CHLORIDE SERPL-SCNC: 103 MMOL/L — SIGNIFICANT CHANGE UP (ref 98–107)
CHLORIDE SERPL-SCNC: 104 MMOL/L — SIGNIFICANT CHANGE UP (ref 98–107)
CO2 SERPL-SCNC: 20 MMOL/L — LOW (ref 22–29)
CO2 SERPL-SCNC: 25 MMOL/L — SIGNIFICANT CHANGE UP (ref 22–29)
CREAT SERPL-MCNC: 0.74 MG/DL — SIGNIFICANT CHANGE UP (ref 0.5–1.3)
CREAT SERPL-MCNC: 0.78 MG/DL — SIGNIFICANT CHANGE UP (ref 0.5–1.3)
EOSINOPHIL # BLD AUTO: 0.24 K/UL — SIGNIFICANT CHANGE UP (ref 0–0.5)
EOSINOPHIL NFR BLD AUTO: 3 % — SIGNIFICANT CHANGE UP (ref 0–6)
GAS PNL BLDA: SIGNIFICANT CHANGE UP
GAS PNL BLDV: 139 MMOL/L — SIGNIFICANT CHANGE UP (ref 135–145)
GAS PNL BLDV: SIGNIFICANT CHANGE UP
GAS PNL BLDV: SIGNIFICANT CHANGE UP
GLUCOSE BLDC GLUCOMTR-MCNC: 119 MG/DL — HIGH (ref 70–99)
GLUCOSE BLDC GLUCOMTR-MCNC: 127 MG/DL — HIGH (ref 70–99)
GLUCOSE BLDC GLUCOMTR-MCNC: 142 MG/DL — HIGH (ref 70–99)
GLUCOSE BLDC GLUCOMTR-MCNC: 162 MG/DL — HIGH (ref 70–99)
GLUCOSE BLDC GLUCOMTR-MCNC: 165 MG/DL — HIGH (ref 70–99)
GLUCOSE BLDC GLUCOMTR-MCNC: 166 MG/DL — HIGH (ref 70–99)
GLUCOSE BLDV-MCNC: 164 MG/DL — HIGH (ref 70–99)
GLUCOSE SERPL-MCNC: 159 MG/DL — HIGH (ref 70–99)
GLUCOSE SERPL-MCNC: 96 MG/DL — SIGNIFICANT CHANGE UP (ref 70–99)
HCO3 BLDV-SCNC: 23 MMOL/L — SIGNIFICANT CHANGE UP (ref 21–29)
HCT VFR BLD CALC: 36.7 % — LOW (ref 39–50)
HCT VFR BLD CALC: 43.7 % — SIGNIFICANT CHANGE UP (ref 39–50)
HCT VFR BLDA CALC: 40 — SIGNIFICANT CHANGE UP (ref 39–50)
HGB BLD CALC-MCNC: 13.1 G/DL — SIGNIFICANT CHANGE UP (ref 13–17)
HGB BLD-MCNC: 12.2 G/DL — LOW (ref 13–17)
HGB BLD-MCNC: 14.5 G/DL — SIGNIFICANT CHANGE UP (ref 13–17)
HOROWITZ INDEX BLDV+IHG-RTO: SIGNIFICANT CHANGE UP
IMM GRANULOCYTES NFR BLD AUTO: 0.4 % — SIGNIFICANT CHANGE UP (ref 0–1.5)
INR BLD: 1.25 RATIO — HIGH (ref 0.88–1.16)
LACTATE BLDV-MCNC: 1.1 MMOL/L — SIGNIFICANT CHANGE UP (ref 0.5–2)
LYMPHOCYTES # BLD AUTO: 2.99 K/UL — SIGNIFICANT CHANGE UP (ref 1–3.3)
LYMPHOCYTES # BLD AUTO: 37 % — SIGNIFICANT CHANGE UP (ref 13–44)
MAGNESIUM SERPL-MCNC: 2.5 MG/DL — SIGNIFICANT CHANGE UP (ref 1.6–2.6)
MCHC RBC-ENTMCNC: 29 PG — SIGNIFICANT CHANGE UP (ref 27–34)
MCHC RBC-ENTMCNC: 29.5 PG — SIGNIFICANT CHANGE UP (ref 27–34)
MCHC RBC-ENTMCNC: 33.2 GM/DL — SIGNIFICANT CHANGE UP (ref 32–36)
MCHC RBC-ENTMCNC: 33.2 GM/DL — SIGNIFICANT CHANGE UP (ref 32–36)
MCV RBC AUTO: 87.4 FL — SIGNIFICANT CHANGE UP (ref 80–100)
MCV RBC AUTO: 88.6 FL — SIGNIFICANT CHANGE UP (ref 80–100)
MONOCYTES # BLD AUTO: 1 K/UL — HIGH (ref 0–0.9)
MONOCYTES NFR BLD AUTO: 12.4 % — SIGNIFICANT CHANGE UP (ref 2–14)
MRSA PCR RESULT.: SIGNIFICANT CHANGE UP
NEUTROPHILS # BLD AUTO: 3.74 K/UL — SIGNIFICANT CHANGE UP (ref 1.8–7.4)
NEUTROPHILS NFR BLD AUTO: 46.2 % — SIGNIFICANT CHANGE UP (ref 43–77)
OTHER CELLS CSF MANUAL: 13 ML/DL — LOW (ref 18–22)
PCO2 BLDV: 44 MMHG — SIGNIFICANT CHANGE UP (ref 35–50)
PH BLDV: 7.36 — SIGNIFICANT CHANGE UP (ref 7.32–7.43)
PLATELET # BLD AUTO: 162 K/UL — SIGNIFICANT CHANGE UP (ref 150–400)
PLATELET # BLD AUTO: 190 K/UL — SIGNIFICANT CHANGE UP (ref 150–400)
PO2 BLDV: 38 MMHG — SIGNIFICANT CHANGE UP (ref 25–45)
POTASSIUM BLDV-SCNC: 4.5 MMOL/L — SIGNIFICANT CHANGE UP (ref 3.4–4.5)
POTASSIUM SERPL-MCNC: 4 MMOL/L — SIGNIFICANT CHANGE UP (ref 3.5–5.3)
POTASSIUM SERPL-MCNC: 4.5 MMOL/L — SIGNIFICANT CHANGE UP (ref 3.5–5.3)
POTASSIUM SERPL-SCNC: 4 MMOL/L — SIGNIFICANT CHANGE UP (ref 3.5–5.3)
POTASSIUM SERPL-SCNC: 4.5 MMOL/L — SIGNIFICANT CHANGE UP (ref 3.5–5.3)
PROT SERPL-MCNC: 5.4 G/DL — LOW (ref 6.6–8.7)
PROTHROM AB SERPL-ACNC: 14.4 SEC — HIGH (ref 10.6–13.6)
RBC # BLD: 4.14 M/UL — LOW (ref 4.2–5.8)
RBC # BLD: 5 M/UL — SIGNIFICANT CHANGE UP (ref 4.2–5.8)
RBC # FLD: 13.8 % — SIGNIFICANT CHANGE UP (ref 10.3–14.5)
RBC # FLD: 13.9 % — SIGNIFICANT CHANGE UP (ref 10.3–14.5)
S AUREUS DNA NOSE QL NAA+PROBE: SIGNIFICANT CHANGE UP
SAO2 % BLDV: 72 % — SIGNIFICANT CHANGE UP
SODIUM SERPL-SCNC: 138 MMOL/L — SIGNIFICANT CHANGE UP (ref 135–145)
SODIUM SERPL-SCNC: 138 MMOL/L — SIGNIFICANT CHANGE UP (ref 135–145)
T3FREE SERPL-MCNC: 3.93 PG/ML — SIGNIFICANT CHANGE UP (ref 1.8–4.6)
T4 FREE SERPL-MCNC: 1.6 NG/DL — SIGNIFICANT CHANGE UP (ref 0.9–1.8)
WBC # BLD: 15.52 K/UL — HIGH (ref 3.8–10.5)
WBC # BLD: 8.08 K/UL — SIGNIFICANT CHANGE UP (ref 3.8–10.5)
WBC # FLD AUTO: 15.52 K/UL — HIGH (ref 3.8–10.5)
WBC # FLD AUTO: 8.08 K/UL — SIGNIFICANT CHANGE UP (ref 3.8–10.5)

## 2021-02-12 PROCEDURE — 33518 CABG ARTERY-VEIN TWO: CPT | Mod: AS

## 2021-02-12 PROCEDURE — 93010 ELECTROCARDIOGRAM REPORT: CPT | Mod: 76

## 2021-02-12 PROCEDURE — 33533 CABG ARTERIAL SINGLE: CPT

## 2021-02-12 PROCEDURE — 33518 CABG ARTERY-VEIN TWO: CPT

## 2021-02-12 PROCEDURE — 71045 X-RAY EXAM CHEST 1 VIEW: CPT | Mod: 26

## 2021-02-12 PROCEDURE — 33533 CABG ARTERIAL SINGLE: CPT | Mod: AS

## 2021-02-12 PROCEDURE — 99223 1ST HOSP IP/OBS HIGH 75: CPT

## 2021-02-12 PROCEDURE — 33508 ENDOSCOPIC VEIN HARVEST: CPT | Mod: 59

## 2021-02-12 RX ORDER — CLOPIDOGREL BISULFATE 75 MG/1
75 TABLET, FILM COATED ORAL DAILY
Refills: 0 | Status: DISCONTINUED | OUTPATIENT
Start: 2021-02-12 | End: 2021-02-17

## 2021-02-12 RX ORDER — VANCOMYCIN HCL 1 G
1000 VIAL (EA) INTRAVENOUS EVERY 12 HOURS
Refills: 0 | Status: COMPLETED | OUTPATIENT
Start: 2021-02-12 | End: 2021-02-14

## 2021-02-12 RX ORDER — PANTOPRAZOLE SODIUM 20 MG/1
40 TABLET, DELAYED RELEASE ORAL ONCE
Refills: 0 | Status: COMPLETED | OUTPATIENT
Start: 2021-02-12 | End: 2021-02-12

## 2021-02-12 RX ORDER — INSULIN HUMAN 100 [IU]/ML
2 INJECTION, SOLUTION SUBCUTANEOUS
Qty: 50 | Refills: 0 | Status: DISCONTINUED | OUTPATIENT
Start: 2021-02-12 | End: 2021-02-13

## 2021-02-12 RX ORDER — ASPIRIN/CALCIUM CARB/MAGNESIUM 324 MG
81 TABLET ORAL DAILY
Refills: 0 | Status: DISCONTINUED | OUTPATIENT
Start: 2021-02-12 | End: 2021-02-17

## 2021-02-12 RX ORDER — NOREPINEPHRINE BITARTRATE/D5W 8 MG/250ML
0.05 PLASTIC BAG, INJECTION (ML) INTRAVENOUS
Qty: 8 | Refills: 0 | Status: DISCONTINUED | OUTPATIENT
Start: 2021-02-12 | End: 2021-02-12

## 2021-02-12 RX ORDER — DEXMEDETOMIDINE HYDROCHLORIDE IN 0.9% SODIUM CHLORIDE 4 UG/ML
0.2 INJECTION INTRAVENOUS
Qty: 200 | Refills: 0 | Status: DISCONTINUED | OUTPATIENT
Start: 2021-02-12 | End: 2021-02-12

## 2021-02-12 RX ORDER — POTASSIUM CHLORIDE 20 MEQ
10 PACKET (EA) ORAL
Refills: 0 | Status: DISCONTINUED | OUTPATIENT
Start: 2021-02-12 | End: 2021-02-12

## 2021-02-12 RX ORDER — CHLORHEXIDINE GLUCONATE 213 G/1000ML
15 SOLUTION TOPICAL EVERY 12 HOURS
Refills: 0 | Status: DISCONTINUED | OUTPATIENT
Start: 2021-02-12 | End: 2021-02-12

## 2021-02-12 RX ORDER — SODIUM CHLORIDE 9 MG/ML
1000 INJECTION INTRAMUSCULAR; INTRAVENOUS; SUBCUTANEOUS
Refills: 0 | Status: DISCONTINUED | OUTPATIENT
Start: 2021-02-12 | End: 2021-02-14

## 2021-02-12 RX ORDER — PROPOFOL 10 MG/ML
10 INJECTION, EMULSION INTRAVENOUS
Qty: 1000 | Refills: 0 | Status: DISCONTINUED | OUTPATIENT
Start: 2021-02-12 | End: 2021-02-12

## 2021-02-12 RX ORDER — MEPERIDINE HYDROCHLORIDE 50 MG/ML
25 INJECTION INTRAMUSCULAR; INTRAVENOUS; SUBCUTANEOUS ONCE
Refills: 0 | Status: DISCONTINUED | OUTPATIENT
Start: 2021-02-12 | End: 2021-02-12

## 2021-02-12 RX ORDER — DEXTROSE 50 % IN WATER 50 %
50 SYRINGE (ML) INTRAVENOUS
Refills: 0 | Status: DISCONTINUED | OUTPATIENT
Start: 2021-02-12 | End: 2021-02-14

## 2021-02-12 RX ORDER — PANTOPRAZOLE SODIUM 20 MG/1
40 TABLET, DELAYED RELEASE ORAL DAILY
Refills: 0 | Status: DISCONTINUED | OUTPATIENT
Start: 2021-02-13 | End: 2021-02-17

## 2021-02-12 RX ORDER — ATORVASTATIN CALCIUM 80 MG/1
80 TABLET, FILM COATED ORAL AT BEDTIME
Refills: 0 | Status: DISCONTINUED | OUTPATIENT
Start: 2021-02-12 | End: 2021-02-17

## 2021-02-12 RX ORDER — ALBUMIN HUMAN 25 %
250 VIAL (ML) INTRAVENOUS ONCE
Refills: 0 | Status: COMPLETED | OUTPATIENT
Start: 2021-02-12 | End: 2021-02-12

## 2021-02-12 RX ORDER — SENNA PLUS 8.6 MG/1
2 TABLET ORAL AT BEDTIME
Refills: 0 | Status: DISCONTINUED | OUTPATIENT
Start: 2021-02-12 | End: 2021-02-17

## 2021-02-12 RX ORDER — DEXTROSE 50 % IN WATER 50 %
25 SYRINGE (ML) INTRAVENOUS
Refills: 0 | Status: DISCONTINUED | OUTPATIENT
Start: 2021-02-12 | End: 2021-02-14

## 2021-02-12 RX ORDER — CHLORHEXIDINE GLUCONATE 213 G/1000ML
5 SOLUTION TOPICAL
Refills: 0 | Status: DISCONTINUED | OUTPATIENT
Start: 2021-02-12 | End: 2021-02-14

## 2021-02-12 RX ORDER — HYDROMORPHONE HYDROCHLORIDE 2 MG/ML
0.5 INJECTION INTRAMUSCULAR; INTRAVENOUS; SUBCUTANEOUS ONCE
Refills: 0 | Status: DISCONTINUED | OUTPATIENT
Start: 2021-02-12 | End: 2021-02-12

## 2021-02-12 RX ORDER — ACETAMINOPHEN 500 MG
1000 TABLET ORAL ONCE
Refills: 0 | Status: COMPLETED | OUTPATIENT
Start: 2021-02-12 | End: 2021-02-12

## 2021-02-12 RX ORDER — FENTANYL CITRATE 50 UG/ML
50 INJECTION INTRAVENOUS
Refills: 0 | Status: DISCONTINUED | OUTPATIENT
Start: 2021-02-12 | End: 2021-02-12

## 2021-02-12 RX ORDER — SODIUM CHLORIDE 9 MG/ML
500 INJECTION, SOLUTION INTRAVENOUS ONCE
Refills: 0 | Status: COMPLETED | OUTPATIENT
Start: 2021-02-12 | End: 2021-02-12

## 2021-02-12 RX ORDER — CEFUROXIME AXETIL 250 MG
1500 TABLET ORAL EVERY 8 HOURS
Refills: 0 | Status: COMPLETED | OUTPATIENT
Start: 2021-02-12 | End: 2021-02-14

## 2021-02-12 RX ORDER — POTASSIUM CHLORIDE 20 MEQ
10 PACKET (EA) ORAL
Refills: 0 | Status: COMPLETED | OUTPATIENT
Start: 2021-02-12 | End: 2021-02-12

## 2021-02-12 RX ADMIN — Medication 0.4 MILLIGRAM(S): at 02:38

## 2021-02-12 RX ADMIN — Medication 81 MILLIGRAM(S): at 18:54

## 2021-02-12 RX ADMIN — FENTANYL CITRATE 50 MICROGRAM(S): 50 INJECTION INTRAVENOUS at 18:35

## 2021-02-12 RX ADMIN — Medication 100 MILLIEQUIVALENT(S): at 14:14

## 2021-02-12 RX ADMIN — Medication 100 MILLIGRAM(S): at 22:17

## 2021-02-12 RX ADMIN — Medication 50 MILLIGRAM(S): at 06:21

## 2021-02-12 RX ADMIN — SENNA PLUS 2 TABLET(S): 8.6 TABLET ORAL at 22:17

## 2021-02-12 RX ADMIN — CHLORHEXIDINE GLUCONATE 15 MILLILITER(S): 213 SOLUTION TOPICAL at 06:22

## 2021-02-12 RX ADMIN — Medication 250 MILLIGRAM(S): at 18:54

## 2021-02-12 RX ADMIN — CLOPIDOGREL BISULFATE 75 MILLIGRAM(S): 75 TABLET, FILM COATED ORAL at 18:54

## 2021-02-12 RX ADMIN — SODIUM CHLORIDE 1000 MILLILITER(S): 9 INJECTION, SOLUTION INTRAVENOUS at 18:51

## 2021-02-12 RX ADMIN — Medication 125 MILLILITER(S): at 17:55

## 2021-02-12 RX ADMIN — ATORVASTATIN CALCIUM 80 MILLIGRAM(S): 80 TABLET, FILM COATED ORAL at 22:17

## 2021-02-12 RX ADMIN — Medication 30 MILLILITER(S): at 06:20

## 2021-02-12 RX ADMIN — CHLORHEXIDINE GLUCONATE 1 APPLICATION(S): 213 SOLUTION TOPICAL at 06:20

## 2021-02-12 RX ADMIN — FENTANYL CITRATE 50 MICROGRAM(S): 50 INJECTION INTRAVENOUS at 19:27

## 2021-02-12 RX ADMIN — Medication 0.4 MILLIGRAM(S): at 03:50

## 2021-02-12 RX ADMIN — PANTOPRAZOLE SODIUM 40 MILLIGRAM(S): 20 TABLET, DELAYED RELEASE ORAL at 14:13

## 2021-02-12 RX ADMIN — MUPIROCIN 1 APPLICATION(S): 20 OINTMENT TOPICAL at 06:21

## 2021-02-12 RX ADMIN — Medication 0.4 MILLIGRAM(S): at 03:00

## 2021-02-12 RX ADMIN — Medication 400 MILLIGRAM(S): at 17:39

## 2021-02-12 RX ADMIN — HYDROMORPHONE HYDROCHLORIDE 0.5 MILLIGRAM(S): 2 INJECTION INTRAMUSCULAR; INTRAVENOUS; SUBCUTANEOUS at 22:05

## 2021-02-12 RX ADMIN — Medication 100 MILLIGRAM(S): at 14:14

## 2021-02-12 NOTE — CONSULT NOTE ADULT - SUBJECTIVE AND OBJECTIVE BOX
Patient is a 70y old  Male who presents with a chief complaint of Chest Pain (11 Feb 2021 22:43)    HPI:  70M with PMHX CAD/MI s/p PCI x9, HTN, HLD, Hx Sinus Pauses, Tobacco Abuse presents to [facility] ER c/o L sided Chest Pain which began acutely the morning PTA at rest and was relieved at that time by ASA 325mg PO but returned later in the day. CP radiates from L hand and across chest. Similar in description to CP 2 years ago for which he had stents placed. Active Smoker but quit smoking 3 days ago. Has not seen Cardio for past 2 years. Compliant with medications. Plavix dc'd by Cardio last post-PCI. +Hx prior PCI to RCA, LAD, and Diag with repeat RCA occlusion in 2018 s/p 2 more PCI STEPHAN (total 9 stents per patient). admitted for CABG  Consult for suppressed TSH <0.01  TSH has been suppressed since 2018    no fhx of thyroid issues  stopped smoking prior to admission      PAST MEDICAL & SURGICAL HISTORY:  CAD in native artery    Sinus pause    HLD (hyperlipidemia)    HTN (hypertension)    Stented coronary artery  x9        Social History:  +Tobacco Abuse quit smoking 3 days ago. Denies etoh or drug abuse. (10 Feb 2021 03:06)      FAMILY HISTORY:  No pertinent family history in first degree relatives          Allergies    No Known Allergies    Intolerances        REVIEW OF SYSTEMS:    CONSTITUTIONAL: No fever, weight loss, or fatigue  EYES: No eye pain, visual disturbances, or discharge  ENMT:  No difficulty hearing, tinnitus, vertigo; No sinus or throat pain  NECK: No pain or stiffness  RESPIRATORY: No cough, wheezing, chills or hemoptysis; No shortness of breath  CARDIOVASCULAR: No chest pain, palpitations, dizziness, or leg swelling  GASTROINTESTINAL: No abdominal or epigastric pain. No nausea, vomiting, or hematemesis; No diarrhea or constipation. No melena or hematochezia.  NEUROLOGICAL: No headaches, memory loss, loss of strength, numbness, or tremors  SKIN: No itching, burning, rashes, or lesions   MUSCULOSKELETAL: No joint pain or swelling; No muscle, back, or extremity pain  PSYCHIATRIC: No depression, anxiety, mood swings, or difficulty sleeping        MEDICATIONS  (STANDING):  influenza   Vaccine 0.5 milliLiter(s) IntraMuscular once    MEDICATIONS  (PRN):      Vital Signs Last 24 Hrs  T(C): 36.7 (12 Feb 2021 05:07), Max: 36.9 (12 Feb 2021 00:54)  T(F): 98 (12 Feb 2021 05:07), Max: 98.4 (12 Feb 2021 00:54)  HR: 60 (12 Feb 2021 06:51) (60 - 72)  BP: 130/66 (12 Feb 2021 06:51) (116/58 - 152/83)  BP(mean): 82 (12 Feb 2021 04:00) (82 - 108)  RR: 18 (12 Feb 2021 05:07) (16 - 24)  SpO2: 96% (12 Feb 2021 06:51) (93% - 99%)    Physical Exam:    Constitutional: NAD, well-developed  HEENT: EOMI, no exophalmos  Neck: trachea midline, no thyroid enlargement  Respiratory: intubated  Cardiovascular: bandaged anterior chest  Gastrointestinal: BS+, soft, ntnd  Extremities: +peripheral edema  Neurological: intubated sedated  Psychiatric: Normal mood and normal affect  Skin: no rashes, no acanthosis    LABS  02-12    138  |  103  |  21.0<H>  ----------------------------<  96  4.0   |  25.0  |  0.78    Ca    9.3      12 Feb 2021 03:34    TPro  7.2  /  Alb  3.7  /  TBili  0.3<L>  /  DBili  0.1  /  AST  26  /  ALT  22  /  AlkPhos  111  02-11                          14.5   8.08  )-----------( 190      ( 12 Feb 2021 03:34 )             43.7       A1C with Estimated Average Glucose Result: 5.4 % (02-11-21 @ 05:34)  A1C with Estimated Average Glucose Result: 5.5 % (02-10-21 @ 04:16)          Aspartate Aminotransferase (AST/SGOT): 26 U/L (02-11-21 @ 05:34)  Alkaline Phosphatase, Serum: 111 U/L (02-11-21 @ 05:34)  Alanine Aminotransferase (ALT/SGPT): 22 U/L (02-11-21 @ 05:34)  Albumin, Serum: 3.7 g/dL (02-11-21 @ 05:34)    Free Thyroxine, Serum: 1.6 ng/dL (02-12-21 @ 02:01)  Thyroid Stimulating Hormone, Serum: <0.10 uIU/mL (02-11-21 @ 13:26)  Thyroid Stimulating Hormone, Serum: <0.10 uIU/mL (02-10-21 @ 04:16)    CAPILLARY BLOOD GLUCOSE      POCT Blood Glucose.: 94 mg/dL (11 Feb 2021 17:10)     Patient is a 70y old  Male who presents with a chief complaint of Chest Pain (11 Feb 2021 22:43)    HPI:  70M with PMHX CAD/MI s/p PCI x9, HTN, HLD, Hx Sinus Pauses, Tobacco Abuse presents to [facility] ER c/o L sided Chest Pain which began acutely the morning PTA at rest and was relieved at that time by ASA 325mg PO but returned later in the day. CP radiates from L hand and across chest. Similar in description to CP 2 years ago for which he had stents placed. Active Smoker but quit smoking 3 days ago. Has not seen Cardio for past 2 years. Compliant with medications. Plavix dc'd by Cardio last post-PCI. +Hx prior PCI to RCA, LAD, and Diag with repeat RCA occlusion in 2018 s/p 2 more PCI STEPHAN (total 9 stents per patient). admitted for CABG  Consult for suppressed TSH <0.01  TSH has been suppressed since 2018    unable to obtain as patient is intubated and sedated    PAST MEDICAL & SURGICAL HISTORY:  CAD in native artery    Sinus pause    HLD (hyperlipidemia)    HTN (hypertension)    Stented coronary artery  x9        Social History:  +Tobacco Abuse quit smoking 3 days ago. Denies etoh or drug abuse. (10 Feb 2021 03:06)      FAMILY HISTORY:  No pertinent family history in first degree relatives          Allergies    No Known Allergies    Intolerances        REVIEW OF SYSTEMS:  unable to obtain      MEDICATIONS  (STANDING):  influenza   Vaccine 0.5 milliLiter(s) IntraMuscular once    MEDICATIONS  (PRN):      Vital Signs Last 24 Hrs  T(C): 36.7 (12 Feb 2021 05:07), Max: 36.9 (12 Feb 2021 00:54)  T(F): 98 (12 Feb 2021 05:07), Max: 98.4 (12 Feb 2021 00:54)  HR: 60 (12 Feb 2021 06:51) (60 - 72)  BP: 130/66 (12 Feb 2021 06:51) (116/58 - 152/83)  BP(mean): 82 (12 Feb 2021 04:00) (82 - 108)  RR: 18 (12 Feb 2021 05:07) (16 - 24)  SpO2: 96% (12 Feb 2021 06:51) (93% - 99%)    Physical Exam:    Constitutional: NAD, obese  HEENT: EOMI, no exophalmos  Respiratory: intubated  Cardiovascular: bandaged anterior chest  Gastrointestinal: BS+, soft, ntnd  Extremities: +peripheral edema  Neurological: intubated sedated  Psychiatric: Normal mood and normal affect  Skin: no rashes, no acanthosis    LABS  02-12    138  |  103  |  21.0<H>  ----------------------------<  96  4.0   |  25.0  |  0.78    Ca    9.3      12 Feb 2021 03:34    TPro  7.2  /  Alb  3.7  /  TBili  0.3<L>  /  DBili  0.1  /  AST  26  /  ALT  22  /  AlkPhos  111  02-11                          14.5   8.08  )-----------( 190      ( 12 Feb 2021 03:34 )             43.7       A1C with Estimated Average Glucose Result: 5.4 % (02-11-21 @ 05:34)  A1C with Estimated Average Glucose Result: 5.5 % (02-10-21 @ 04:16)          Aspartate Aminotransferase (AST/SGOT): 26 U/L (02-11-21 @ 05:34)  Alkaline Phosphatase, Serum: 111 U/L (02-11-21 @ 05:34)  Alanine Aminotransferase (ALT/SGPT): 22 U/L (02-11-21 @ 05:34)  Albumin, Serum: 3.7 g/dL (02-11-21 @ 05:34)    Free Thyroxine, Serum: 1.6 ng/dL (02-12-21 @ 02:01)  Thyroid Stimulating Hormone, Serum: <0.10 uIU/mL (02-11-21 @ 13:26)  Thyroid Stimulating Hormone, Serum: <0.10 uIU/mL (02-10-21 @ 04:16)    CAPILLARY BLOOD GLUCOSE      POCT Blood Glucose.: 94 mg/dL (11 Feb 2021 17:10)

## 2021-02-12 NOTE — CONSULT NOTE ADULT - ASSESSMENT
70M with PMHX CAD/MI s/p PCI x9, HTN, HLD, Hx Sinus Pauses, Tobacco Abuse presents to [facility] ER c/o L sided Chest Pain found to have severe CAD. s/p CABG 2/12/21  Consult for suppressed TSH    Hyperthyroidism  -probably long standing as TSH was suppressed since 2018 but normal TFTs  -check TSI  -will likely need therapy given cardiac issues but does not need to be started at this time  -can start as an outpatient but will need follow up    CAD- s/p CABG. care as per primary team    HLD- on statin 70M with PMHX CAD/MI s/p PCI x9, HTN, HLD, Hx Sinus Pauses, Tobacco Abuse presents to [facility] ER c/o L sided Chest Pain found to have severe CAD. s/p CABG 2/12/21  Consult for suppressed TSH    Hyperthyroidism  -probably long standing as TSH was suppressed since 2018 but normal TFTs  -check TSI  -will likely need therapy given cardiac issues but does not need to be started at this time  -can start as an outpatient but will need follow up    CAD- s/p CABG. care as per primary team    HLD- on statin    discussed with primary team

## 2021-02-12 NOTE — BRIEF OPERATIVE NOTE - NSICDXBRIEFPROCEDURE_GEN_ALL_CORE_FT
PROCEDURES:  CABG, using endoscopic vessel harvesting 12-Feb-2021 13:15:18 Three Vessel CABG (SVG-Ramus, SVG-RPDA, NAGEL-LAD) Padmini Seals

## 2021-02-13 LAB
ANION GAP SERPL CALC-SCNC: 8 MMOL/L — SIGNIFICANT CHANGE UP (ref 5–17)
BUN SERPL-MCNC: 17 MG/DL — SIGNIFICANT CHANGE UP (ref 8–20)
CALCIUM SERPL-MCNC: 8.8 MG/DL — SIGNIFICANT CHANGE UP (ref 8.6–10.2)
CHLORIDE SERPL-SCNC: 107 MMOL/L — SIGNIFICANT CHANGE UP (ref 98–107)
CK MB CFR SERPL CALC: 25 NG/ML — HIGH (ref 0–6.7)
CK MB CFR SERPL CALC: 37.6 NG/ML — HIGH (ref 0–6.7)
CK MB CFR SERPL CALC: 46.9 NG/ML — HIGH (ref 0–6.7)
CK SERPL-CCNC: 490 U/L — HIGH (ref 30–200)
CK SERPL-CCNC: 605 U/L — HIGH (ref 30–200)
CK SERPL-CCNC: 673 U/L — HIGH (ref 30–200)
CO2 SERPL-SCNC: 24 MMOL/L — SIGNIFICANT CHANGE UP (ref 22–29)
CREAT SERPL-MCNC: 0.68 MG/DL — SIGNIFICANT CHANGE UP (ref 0.5–1.3)
GLUCOSE BLDC GLUCOMTR-MCNC: 101 MG/DL — HIGH (ref 70–99)
GLUCOSE BLDC GLUCOMTR-MCNC: 109 MG/DL — HIGH (ref 70–99)
GLUCOSE BLDC GLUCOMTR-MCNC: 113 MG/DL — HIGH (ref 70–99)
GLUCOSE BLDC GLUCOMTR-MCNC: 116 MG/DL — HIGH (ref 70–99)
GLUCOSE BLDC GLUCOMTR-MCNC: 118 MG/DL — HIGH (ref 70–99)
GLUCOSE BLDC GLUCOMTR-MCNC: 120 MG/DL — HIGH (ref 70–99)
GLUCOSE BLDC GLUCOMTR-MCNC: 121 MG/DL — HIGH (ref 70–99)
GLUCOSE BLDC GLUCOMTR-MCNC: 121 MG/DL — HIGH (ref 70–99)
GLUCOSE BLDC GLUCOMTR-MCNC: 129 MG/DL — HIGH (ref 70–99)
GLUCOSE SERPL-MCNC: 122 MG/DL — HIGH (ref 70–99)
HCT VFR BLD CALC: 37.4 % — LOW (ref 39–50)
HGB BLD-MCNC: 12.2 G/DL — LOW (ref 13–17)
MAGNESIUM SERPL-MCNC: 2.4 MG/DL — SIGNIFICANT CHANGE UP (ref 1.6–2.6)
MCHC RBC-ENTMCNC: 29.2 PG — SIGNIFICANT CHANGE UP (ref 27–34)
MCHC RBC-ENTMCNC: 32.6 GM/DL — SIGNIFICANT CHANGE UP (ref 32–36)
MCV RBC AUTO: 89.5 FL — SIGNIFICANT CHANGE UP (ref 80–100)
PLATELET # BLD AUTO: 150 K/UL — SIGNIFICANT CHANGE UP (ref 150–400)
POTASSIUM SERPL-MCNC: 4.5 MMOL/L — SIGNIFICANT CHANGE UP (ref 3.5–5.3)
POTASSIUM SERPL-SCNC: 4.5 MMOL/L — SIGNIFICANT CHANGE UP (ref 3.5–5.3)
RBC # BLD: 4.18 M/UL — LOW (ref 4.2–5.8)
RBC # FLD: 14 % — SIGNIFICANT CHANGE UP (ref 10.3–14.5)
SODIUM SERPL-SCNC: 139 MMOL/L — SIGNIFICANT CHANGE UP (ref 135–145)
TROPONIN T SERPL-MCNC: 1.08 NG/ML — HIGH (ref 0–0.06)
TROPONIN T SERPL-MCNC: 1.13 NG/ML — HIGH (ref 0–0.06)
TROPONIN T SERPL-MCNC: 1.16 NG/ML — HIGH (ref 0–0.06)
WBC # BLD: 17.37 K/UL — HIGH (ref 3.8–10.5)
WBC # FLD AUTO: 17.37 K/UL — HIGH (ref 3.8–10.5)

## 2021-02-13 PROCEDURE — 71045 X-RAY EXAM CHEST 1 VIEW: CPT | Mod: 26

## 2021-02-13 PROCEDURE — 93010 ELECTROCARDIOGRAM REPORT: CPT | Mod: 76

## 2021-02-13 RX ORDER — GABAPENTIN 400 MG/1
300 CAPSULE ORAL THREE TIMES A DAY
Refills: 0 | Status: DISCONTINUED | OUTPATIENT
Start: 2021-02-13 | End: 2021-02-17

## 2021-02-13 RX ORDER — LIDOCAINE 4 G/100G
1 CREAM TOPICAL DAILY
Refills: 0 | Status: DISCONTINUED | OUTPATIENT
Start: 2021-02-13 | End: 2021-02-17

## 2021-02-13 RX ORDER — INSULIN LISPRO 100/ML
VIAL (ML) SUBCUTANEOUS
Refills: 0 | Status: DISCONTINUED | OUTPATIENT
Start: 2021-02-13 | End: 2021-02-17

## 2021-02-13 RX ORDER — OXYCODONE HYDROCHLORIDE 5 MG/1
5 TABLET ORAL EVERY 6 HOURS
Refills: 0 | Status: DISCONTINUED | OUTPATIENT
Start: 2021-02-13 | End: 2021-02-17

## 2021-02-13 RX ORDER — ACETAMINOPHEN 500 MG
1000 TABLET ORAL ONCE
Refills: 0 | Status: COMPLETED | OUTPATIENT
Start: 2021-02-13 | End: 2021-02-13

## 2021-02-13 RX ORDER — HYDROMORPHONE HYDROCHLORIDE 2 MG/ML
0.5 INJECTION INTRAMUSCULAR; INTRAVENOUS; SUBCUTANEOUS ONCE
Refills: 0 | Status: DISCONTINUED | OUTPATIENT
Start: 2021-02-13 | End: 2021-02-13

## 2021-02-13 RX ORDER — ENOXAPARIN SODIUM 100 MG/ML
40 INJECTION SUBCUTANEOUS DAILY
Refills: 0 | Status: DISCONTINUED | OUTPATIENT
Start: 2021-02-13 | End: 2021-02-17

## 2021-02-13 RX ORDER — OXYCODONE HYDROCHLORIDE 5 MG/1
10 TABLET ORAL EVERY 6 HOURS
Refills: 0 | Status: DISCONTINUED | OUTPATIENT
Start: 2021-02-13 | End: 2021-02-17

## 2021-02-13 RX ORDER — METOPROLOL TARTRATE 50 MG
25 TABLET ORAL
Refills: 0 | Status: DISCONTINUED | OUTPATIENT
Start: 2021-02-13 | End: 2021-02-14

## 2021-02-13 RX ADMIN — SENNA PLUS 2 TABLET(S): 8.6 TABLET ORAL at 21:08

## 2021-02-13 RX ADMIN — PANTOPRAZOLE SODIUM 40 MILLIGRAM(S): 20 TABLET, DELAYED RELEASE ORAL at 11:07

## 2021-02-13 RX ADMIN — Medication 100 MILLIGRAM(S): at 23:00

## 2021-02-13 RX ADMIN — ENOXAPARIN SODIUM 40 MILLIGRAM(S): 100 INJECTION SUBCUTANEOUS at 11:07

## 2021-02-13 RX ADMIN — Medication 100 MILLIGRAM(S): at 17:22

## 2021-02-13 RX ADMIN — Medication 81 MILLIGRAM(S): at 11:07

## 2021-02-13 RX ADMIN — Medication 25 MILLIGRAM(S): at 17:21

## 2021-02-13 RX ADMIN — GABAPENTIN 300 MILLIGRAM(S): 400 CAPSULE ORAL at 21:08

## 2021-02-13 RX ADMIN — Medication 100 MILLIGRAM(S): at 06:13

## 2021-02-13 RX ADMIN — OXYCODONE HYDROCHLORIDE 10 MILLIGRAM(S): 5 TABLET ORAL at 12:59

## 2021-02-13 RX ADMIN — CHLORHEXIDINE GLUCONATE 5 MILLILITER(S): 213 SOLUTION TOPICAL at 06:14

## 2021-02-13 RX ADMIN — CLOPIDOGREL BISULFATE 75 MILLIGRAM(S): 75 TABLET, FILM COATED ORAL at 11:07

## 2021-02-13 RX ADMIN — LIDOCAINE 1 PATCH: 4 CREAM TOPICAL at 20:31

## 2021-02-13 RX ADMIN — ATORVASTATIN CALCIUM 80 MILLIGRAM(S): 80 TABLET, FILM COATED ORAL at 21:08

## 2021-02-13 RX ADMIN — SODIUM CHLORIDE 5 MILLILITER(S): 9 INJECTION INTRAMUSCULAR; INTRAVENOUS; SUBCUTANEOUS at 06:23

## 2021-02-13 RX ADMIN — Medication 250 MILLIGRAM(S): at 17:55

## 2021-02-13 RX ADMIN — Medication 250 MILLIGRAM(S): at 06:42

## 2021-02-13 RX ADMIN — Medication 400 MILLIGRAM(S): at 05:00

## 2021-02-13 RX ADMIN — Medication 25 MILLIGRAM(S): at 06:17

## 2021-02-13 RX ADMIN — SODIUM CHLORIDE 10 MILLILITER(S): 9 INJECTION INTRAMUSCULAR; INTRAVENOUS; SUBCUTANEOUS at 06:22

## 2021-02-13 RX ADMIN — Medication 400 MILLIGRAM(S): at 12:59

## 2021-02-13 RX ADMIN — HYDROMORPHONE HYDROCHLORIDE 0.5 MILLIGRAM(S): 2 INJECTION INTRAMUSCULAR; INTRAVENOUS; SUBCUTANEOUS at 09:45

## 2021-02-13 RX ADMIN — OXYCODONE HYDROCHLORIDE 10 MILLIGRAM(S): 5 TABLET ORAL at 21:08

## 2021-02-13 RX ADMIN — LIDOCAINE 1 PATCH: 4 CREAM TOPICAL at 17:55

## 2021-02-13 RX ADMIN — INSULIN HUMAN 2 UNIT(S)/HR: 100 INJECTION, SOLUTION SUBCUTANEOUS at 06:14

## 2021-02-13 NOTE — PHYSICAL THERAPY INITIAL EVALUATION ADULT - GENERAL OBSERVATIONS, REHAB EVAL
Patient received standing in corridor with RN, NAD, breathing RA, +right IJ line, +right radial line, +IV, +monitor, +chest tube x2, +meehan, +pacer box, +SCDs. Pt agreeable to Physical Therapy evaluation.

## 2021-02-13 NOTE — PROGRESS NOTE ADULT - SUBJECTIVE AND OBJECTIVE BOX
[place] [facility] - [facility], THE HEART CENTER                                   [address] [place] [address]                                                      PHONE: [phone]                                                         FAX: [phone]  [email or web address]  ---------------------------------------------------------------------------------------------------------------------------------    Overnight events/patient complaints: patient seen at beside. he complains of pain at his surgical site, nausea, and weakness.       No Known Allergies    MEDICATIONS  (STANDING):  aspirin enteric coated 81 milliGRAM(s) Oral daily  atorvastatin 80 milliGRAM(s) Oral at bedtime  cefuroxime  IVPB 1500 milliGRAM(s) IV Intermittent every 8 hours  chlorhexidine 0.12% Liquid 5 milliLiter(s) Oral Mucosa two times a day  clopidogrel Tablet 75 milliGRAM(s) Oral daily  dextrose 50% Injectable 50 milliLiter(s) IV Push every 15 minutes  dextrose 50% Injectable 25 milliLiter(s) IV Push every 15 minutes  enoxaparin Injectable 40 milliGRAM(s) SubCutaneous daily  influenza   Vaccine 0.5 milliLiter(s) IntraMuscular once  insulin lispro (ADMELOG) corrective regimen sliding scale   SubCutaneous Before meals and at bedtime  insulin regular Infusion 2 Unit(s)/Hr (2 mL/Hr) IV Continuous <Continuous>  metoprolol tartrate 25 milliGRAM(s) Oral two times a day  pantoprazole    Tablet 40 milliGRAM(s) Oral daily  senna 2 Tablet(s) Oral at bedtime  sodium chloride 0.9%. 1000 milliLiter(s) (10 mL/Hr) IV Continuous <Continuous>  sodium chloride 0.9%. 1000 milliLiter(s) (5 mL/Hr) IV Continuous <Continuous>  vancomycin  IVPB 1000 milliGRAM(s) IV Intermittent every 12 hours    MEDICATIONS  (PRN):      Vital Signs Last 24 Hrs  T(C): 37.3 (13 Feb 2021 08:00), Max: 37.3 (13 Feb 2021 08:00)  T(F): 99.1 (13 Feb 2021 08:00), Max: 99.1 (13 Feb 2021 08:00)  HR: 84 (13 Feb 2021 10:00) (65 - 84)  BP: --  BP(mean): --  RR: 18 (13 Feb 2021 10:00) (10 - 44)  SpO2: 91% (13 Feb 2021 10:00) (91% - 100%)  ICU Vital Signs Last 24 Hrs  KIRSTEN KAUFMAN  I&O's Detail    12 Feb 2021 07:01  -  13 Feb 2021 07:00  --------------------------------------------------------  IN:    Albumin 5%  - 250 mL: 250 mL    Insulin: 40 mL    IV PiggyBack: 100 mL    IV PiggyBack: 150 mL    IV PiggyBack: 50 mL    Lactated Ringers Bolus: 500 mL    Norepinephrine: 12 mL    Oral Fluid: 390 mL    Propofol: 60 mL    sodium chloride 0.9%: 180 mL    sodium chloride 0.9%: 90 mL  Total IN: 1822 mL    OUT:    Chest Tube (mL): 210 mL    Chest Tube (mL): 190 mL    Chest Tube (mL): 200 mL    Indwelling Catheter - Urethral (mL): 1775 mL  Total OUT: 2375 mL    Total NET: -553 mL      13 Feb 2021 07:01  -  13 Feb 2021 11:23  --------------------------------------------------------  IN:    Insulin: 5 mL    Oral Fluid: 60 mL    sodium chloride 0.9%: 15 mL    sodium chloride 0.9%: 30 mL  Total IN: 110 mL    OUT:    Chest Tube (mL): 10 mL    Chest Tube (mL): 50 mL    Indwelling Catheter - Urethral (mL): 125 mL  Total OUT: 185 mL    Total NET: -75 mL        Drug Dosing Weight  KIRSTEN KAUFMAN  Mode: CPAP with PS, FiO2: 40, PEEP: 7, PS: 10    PHYSICAL EXAM:  General: Appears well developed, well nourished alert and cooperative.  HEENT: Head; normocephalic, atraumatic.  Eyes: Pupils reactive, cornea wnl.  Neck: Supple, no nodes adenopathy, no NVD or carotid bruit or thyromegaly.  CARDIOVASCULAR: Normal S1 and S2, No murmur, rub, gallop or lift.   LUNGS: reduced breath sounds b/l base  ABDOMEN: Soft, nontender without mass or organomegaly. bowel sounds normoactive.  EXTREMITIES: No clubbing, cyanosis or edema. Distal pulses wnl.   SKIN: warm and dry with normal turgor.  NEURO: Alert/oriented x 3/normal motor exam. No pathologic reflexes.    PSYCH: normal affect.        LABS:                        12.2   17.37 )-----------( 150      ( 13 Feb 2021 02:44 )             37.4     02-13    139  |  107  |  17.0  ----------------------------<  122<H>  4.5   |  24.0  |  0.68    Ca    8.8      13 Feb 2021 02:45  Mg     2.4     02-13    TPro  5.4<L>  /  Alb  3.3  /  TBili  0.6  /  DBili  x   /  AST  63<H>  /  ALT  18  /  AlkPhos  71  02-12    KIRSTEN KAUFMAN  CARDIAC MARKERS ( 13 Feb 2021 08:26 )  x     / 1.16 ng/mL / 673 U/L / x     / 46.9 ng/mL      PT/INR - ( 12 Feb 2021 14:17 )   PT: 14.4 sec;   INR: 1.25 ratio         PTT - ( 12 Feb 2021 03:34 )  PTT:30.2 sec      RADIOLOGY & ADDITIONAL STUDIES:    INTERPRETATION OF TELEMETRY (personally reviewed): sinus rhythm       ASSESSMENT AND PLAN:  In summary, KIRSTEN KAUFMAN is an 70y Male with past medical history significant for CAD/MI remote PCI to LAD/RCA lasted cardiac cath Prior stent in LAD is patent with 100% occluded RCA in which RCA was treated with thrombectomy/PTCA/STENT (STEPHAN-Resolute) good results, HTN, HLD, Tobacco Abuse who presents [facility] ER c/o left sided Chest Pain which began acutely the morning at rest and was relieved at that time by ASA 325mg PO but returned later in the day.     NSTEMI, Severe 3V CAD, PCI, Smoker, HTN, HLD -- Cath showed severe multivessel CAD with LAD in-stent re-stenosis and he is now s/p 3V CABG (SVG-Ramus, SVG-RPDA, LIMA-LAD) on 2/12/21.  - continue Heparin infusion for ACS protocol  - continue ASA 81 mg, Plavix 75, and high dose statin therapy  - continue Lopressor 25 mg BID  - continue telemetry  - supportive care and chest tube management CT ICU    Will follow closely with you.

## 2021-02-13 NOTE — PHYSICAL THERAPY INITIAL EVALUATION ADULT - ADDITIONAL COMMENTS
Patient states he lives alone, but will be staying with his cousin upon discharge. He states his cousin is able to assist as needed. He will have to perform 8 KRISS with railing down to basement level. Independent prior to admission without use of AD, no DME at home. +.

## 2021-02-13 NOTE — DIETITIAN INITIAL EVALUATION ADULT. - OTHER INFO
70 year old of CAD/MI remote PCI to LAD/RCA lasted cardiac cath prior stent in LAD is patent with 100% occluded RCA in which RCA was treated with thrombectomy/PTCA/STENT, HTN, HLD, tobacco dependence; who presents with chest pain. Pt admitted with chest pain concern for ACS. Cath showed severe multivessel CAD with LAD in-stent re-stenosis and is now s/p 3V CABG. Attempted to interview x2, pt sleeping soundly. Diet now advanced to consistent carbohydrate, DASH/TLC. RD to follow up with subjective interview/diet education as feasible.

## 2021-02-13 NOTE — DIETITIAN INITIAL EVALUATION ADULT. - PERTINENT MEDS FT
MEDICATIONS  (STANDING):  aspirin enteric coated 81 milliGRAM(s) Oral daily  atorvastatin 80 milliGRAM(s) Oral at bedtime  cefuroxime  IVPB 1500 milliGRAM(s) IV Intermittent every 8 hours  chlorhexidine 0.12% Liquid 5 milliLiter(s) Oral Mucosa two times a day  clopidogrel Tablet 75 milliGRAM(s) Oral daily  dextrose 50% Injectable 50 milliLiter(s) IV Push every 15 minutes  dextrose 50% Injectable 25 milliLiter(s) IV Push every 15 minutes  enoxaparin Injectable 40 milliGRAM(s) SubCutaneous daily  influenza   Vaccine 0.5 milliLiter(s) IntraMuscular once  insulin lispro (ADMELOG) corrective regimen sliding scale   SubCutaneous Before meals and at bedtime  insulin regular Infusion 2 Unit(s)/Hr (2 mL/Hr) IV Continuous <Continuous>  metoprolol tartrate 25 milliGRAM(s) Oral two times a day  pantoprazole    Tablet 40 milliGRAM(s) Oral daily  senna 2 Tablet(s) Oral at bedtime  sodium chloride 0.9%. 1000 milliLiter(s) (10 mL/Hr) IV Continuous <Continuous>  sodium chloride 0.9%. 1000 milliLiter(s) (5 mL/Hr) IV Continuous <Continuous>  vancomycin  IVPB 1000 milliGRAM(s) IV Intermittent every 12 hours    MEDICATIONS  (PRN):  oxyCODONE    IR 10 milliGRAM(s) Oral every 6 hours PRN Severe Pain (7 - 10)  oxyCODONE    IR 5 milliGRAM(s) Oral every 6 hours PRN Moderate Pain (4 - 6)

## 2021-02-13 NOTE — PROGRESS NOTE ADULT - SUBJECTIVE AND OBJECTIVE BOX
Subjective:  71yo M resting comfortable, requesting to get OOB to chair.      HPI:  71 y/o M with PMHX CAD/MI s/p PCI x9, HTN, HLD, Hx Sinus Pauses, Tobacco Abuse presents to [facility] ER c/o L sided Chest Pain which began acutely the morning PTA at rest and was relieved at that time by ASA 325mg PO but returned later in the day. CP radiates from L hand and across chest. Similar in description to CP 2 years ago for which he had stents placed. Relieved with SL Nitro 0.4mg x1 in ER. +associated Palpitations. No diaphoresis. No associated SOB/BARRERA. Denies cough. Active Smoker but quit smoking 3 days ago. Has not seen Cardio for past 2 years. Compliant with medications. Plavix dc'd by Cardio last post-PCI. +Hx prior PCI to RCA, LAD, and Diag with repeat RCA occlusion in 2018 s/p 2 more PCI STEPHAN (total 9 stents per patient).     ROS negative unless mentioned above. (10 Feb 2021 03:06)          PAST MEDICAL & SURGICAL HISTORY:  CAD in native artery    Sinus pause    HLD (hyperlipidemia)    HTN (hypertension)    Stented coronary artery  x9            MEDICATIONS  (STANDING):  aspirin enteric coated 81 milliGRAM(s) Oral daily  atorvastatin 80 milliGRAM(s) Oral at bedtime  cefuroxime  IVPB 1500 milliGRAM(s) IV Intermittent every 8 hours  chlorhexidine 0.12% Liquid 5 milliLiter(s) Oral Mucosa two times a day  clopidogrel Tablet 75 milliGRAM(s) Oral daily  dextrose 50% Injectable 50 milliLiter(s) IV Push every 15 minutes  dextrose 50% Injectable 25 milliLiter(s) IV Push every 15 minutes  HYDROmorphone  Injectable 0.5 milliGRAM(s) IV Push once  influenza   Vaccine 0.5 milliLiter(s) IntraMuscular once  insulin regular Infusion 2 Unit(s)/Hr (2 mL/Hr) IV Continuous <Continuous>  pantoprazole    Tablet 40 milliGRAM(s) Oral daily  senna 2 Tablet(s) Oral at bedtime  sodium chloride 0.9%. 1000 milliLiter(s) (10 mL/Hr) IV Continuous <Continuous>  sodium chloride 0.9%. 1000 milliLiter(s) (5 mL/Hr) IV Continuous <Continuous>  vancomycin  IVPB 1000 milliGRAM(s) IV Intermittent every 12 hours    MEDICATIONS  (PRN):          Allergies    No Known Allergies    Intolerances          WEIGHTS:  Daily Height in cm: 182.88 (12 Feb 2021 05:07)    Daily   Admit Wt: Drug Dosing Weight  Height (cm): 182.9 (12 Feb 2021 06:45)  Weight (kg): 108.9 (12 Feb 2021 06:45)  BMI (kg/m2): 32.6 (12 Feb 2021 06:45)  BSA (m2): 2.3 (12 Feb 2021 06:45)  I&O's Summary    11 Feb 2021 07:01  -  12 Feb 2021 07:00  --------------------------------------------------------  IN: 48 mL / OUT: 0 mL / NET: 48 mL    12 Feb 2021 07:01  -  13 Feb 2021 04:54  --------------------------------------------------------  IN: 1278 mL / OUT: 1700 mL / NET: -422 mL        VITAL SIGNS:  ICU Vital Signs Last 24 Hrs  T(C): 37 (12 Feb 2021 19:00), Max: 37.2 (12 Feb 2021 17:00)  T(F): 98.6 (12 Feb 2021 19:00), Max: 99 (12 Feb 2021 17:00)  HR: 77 (13 Feb 2021 04:30) (60 - 77)  BP: 130/66 (12 Feb 2021 06:51) (116/58 - 130/66)  BP(mean): --  ABP: 134/56 (13 Feb 2021 04:30) (58/58 - 151/69)  ABP(mean): 82 (13 Feb 2021 04:30) (58 - 99)  RR: 21 (13 Feb 2021 04:30) (10 - 44)  SpO2: 100% (13 Feb 2021 04:30) (93% - 100%)        All laboratory results, radiology and medications reviewed.    LABS:  02-13    139  |  107  |  17.0  ----------------------------<  122<H>  4.5   |  24.0  |  0.68    Ca    8.8      13 Feb 2021 02:45  Mg     2.4     02-13    TPro  5.4<L>  /  Alb  3.3  /  TBili  0.6  /  DBili  x   /  AST  63<H>  /  ALT  18  /  AlkPhos  71  02-12                                 12.2   17.37 )-----------( 150      ( 13 Feb 2021 02:44 )             37.4          PT/INR - ( 12 Feb 2021 14:17 )   PT: 14.4 sec;   INR: 1.25 ratio         PTT - ( 12 Feb 2021 03:34 )  PTT:30.2 sec  Bilirubin Total, Serum: 0.6 mg/dL (02-12 @ 14:17)    ABG - ( 12 Feb 2021 22:37 )  pH, Arterial: 7.36  pH, Blood: x     /  pCO2: 46    /  pO2: 109   / HCO3: 25    / Base Excess: 0.3   /  SaO2: 98                    CAPILLARY BLOOD GLUCOSE      POCT Blood Glucose.: 129 mg/dL (13 Feb 2021 03:08)  POCT Blood Glucose.: 120 mg/dL (13 Feb 2021 01:14)  POCT Blood Glucose.: 119 mg/dL (12 Feb 2021 22:04)  POCT Blood Glucose.: 127 mg/dL (12 Feb 2021 20:19)  POCT Blood Glucose.: 142 mg/dL (12 Feb 2021 17:54)  POCT Blood Glucose.: 165 mg/dL (12 Feb 2021 16:50)  POCT Blood Glucose.: 166 mg/dL (12 Feb 2021 15:33)  POCT Blood Glucose.: 162 mg/dL (12 Feb 2021 14:33)             PHYSICAL EXAM:  General:  well nourished, no acute distress  Neurology:  alert and oriented X 4, nonfocal, no gross deficits  Respiratory:  clear to auscultation bilaterally  CV:  regular rate and rhythm, normal S1 S2  Abdomen:  soft, nontender, nondistended, positive bowel sounds  Extremities:  warm, well perfused, no edema +DP pulses  Incisions:  midline sternal incision, c/d/i, sternum stable

## 2021-02-13 NOTE — DIETITIAN INITIAL EVALUATION ADULT. - PERTINENT LABORATORY DATA
02-13 Na139 mmol/L Glu 122 mg/dL<H> K+ 4.5 mmol/L Cr  0.68 mg/dL BUN 17.0 mg/dL Phos n/a   Alb n/a   PAB n/a

## 2021-02-13 NOTE — DIETITIAN INITIAL EVALUATION ADULT. - ORAL NUTRITION SUPPLEMENTS
if pt with suboptimal po intake, add Ensure Enlive TID to optimize po intake and provide an additional 350 kcal, 20g protein per serving.

## 2021-02-13 NOTE — PHYSICAL THERAPY INITIAL EVALUATION ADULT - PERTINENT HX OF CURRENT PROBLEM, REHAB EVAL
71 y/o M with PMHX CAD/MI s/p PCI x9, HTN, HLD, Hx Sinus Pauses, Tobacco Abuse presents to [facility] ER c/o L sided Chest Pain, now s/p CABG x3

## 2021-02-14 LAB
ANION GAP SERPL CALC-SCNC: 11 MMOL/L — SIGNIFICANT CHANGE UP (ref 5–17)
BUN SERPL-MCNC: 24 MG/DL — HIGH (ref 8–20)
CALCIUM SERPL-MCNC: 8.8 MG/DL — SIGNIFICANT CHANGE UP (ref 8.6–10.2)
CHLORIDE SERPL-SCNC: 106 MMOL/L — SIGNIFICANT CHANGE UP (ref 98–107)
CO2 SERPL-SCNC: 22 MMOL/L — SIGNIFICANT CHANGE UP (ref 22–29)
CREAT SERPL-MCNC: 0.75 MG/DL — SIGNIFICANT CHANGE UP (ref 0.5–1.3)
GLUCOSE BLDC GLUCOMTR-MCNC: 118 MG/DL — HIGH (ref 70–99)
GLUCOSE BLDC GLUCOMTR-MCNC: 128 MG/DL — HIGH (ref 70–99)
GLUCOSE SERPL-MCNC: 129 MG/DL — HIGH (ref 70–99)
HCT VFR BLD CALC: 35.5 % — LOW (ref 39–50)
HGB BLD-MCNC: 11.4 G/DL — LOW (ref 13–17)
MAGNESIUM SERPL-MCNC: 2.4 MG/DL — SIGNIFICANT CHANGE UP (ref 1.6–2.6)
MCHC RBC-ENTMCNC: 28.9 PG — SIGNIFICANT CHANGE UP (ref 27–34)
MCHC RBC-ENTMCNC: 32.1 GM/DL — SIGNIFICANT CHANGE UP (ref 32–36)
MCV RBC AUTO: 90.1 FL — SIGNIFICANT CHANGE UP (ref 80–100)
PLATELET # BLD AUTO: 138 K/UL — LOW (ref 150–400)
POTASSIUM SERPL-MCNC: 4.5 MMOL/L — SIGNIFICANT CHANGE UP (ref 3.5–5.3)
POTASSIUM SERPL-SCNC: 4.5 MMOL/L — SIGNIFICANT CHANGE UP (ref 3.5–5.3)
RBC # BLD: 3.94 M/UL — LOW (ref 4.2–5.8)
RBC # FLD: 14.3 % — SIGNIFICANT CHANGE UP (ref 10.3–14.5)
SODIUM SERPL-SCNC: 139 MMOL/L — SIGNIFICANT CHANGE UP (ref 135–145)
WBC # BLD: 16.67 K/UL — HIGH (ref 3.8–10.5)
WBC # FLD AUTO: 16.67 K/UL — HIGH (ref 3.8–10.5)

## 2021-02-14 PROCEDURE — 71045 X-RAY EXAM CHEST 1 VIEW: CPT | Mod: 26

## 2021-02-14 RX ORDER — METOPROLOL TARTRATE 50 MG
25 TABLET ORAL ONCE
Refills: 0 | Status: COMPLETED | OUTPATIENT
Start: 2021-02-14 | End: 2021-02-14

## 2021-02-14 RX ORDER — POLYETHYLENE GLYCOL 3350 17 G/17G
17 POWDER, FOR SOLUTION ORAL
Refills: 0 | Status: DISCONTINUED | OUTPATIENT
Start: 2021-02-14 | End: 2021-02-17

## 2021-02-14 RX ORDER — METOPROLOL TARTRATE 50 MG
50 TABLET ORAL EVERY 12 HOURS
Refills: 0 | Status: DISCONTINUED | OUTPATIENT
Start: 2021-02-14 | End: 2021-02-17

## 2021-02-14 RX ORDER — SODIUM CHLORIDE 9 MG/ML
3 INJECTION INTRAMUSCULAR; INTRAVENOUS; SUBCUTANEOUS EVERY 8 HOURS
Refills: 0 | Status: DISCONTINUED | OUTPATIENT
Start: 2021-02-14 | End: 2021-02-17

## 2021-02-14 RX ADMIN — LIDOCAINE 1 PATCH: 4 CREAM TOPICAL at 06:17

## 2021-02-14 RX ADMIN — OXYCODONE HYDROCHLORIDE 10 MILLIGRAM(S): 5 TABLET ORAL at 09:24

## 2021-02-14 RX ADMIN — ENOXAPARIN SODIUM 40 MILLIGRAM(S): 100 INJECTION SUBCUTANEOUS at 11:41

## 2021-02-14 RX ADMIN — Medication 25 MILLIGRAM(S): at 05:07

## 2021-02-14 RX ADMIN — CLOPIDOGREL BISULFATE 75 MILLIGRAM(S): 75 TABLET, FILM COATED ORAL at 11:41

## 2021-02-14 RX ADMIN — GABAPENTIN 300 MILLIGRAM(S): 400 CAPSULE ORAL at 20:48

## 2021-02-14 RX ADMIN — SODIUM CHLORIDE 3 MILLILITER(S): 9 INJECTION INTRAMUSCULAR; INTRAVENOUS; SUBCUTANEOUS at 13:29

## 2021-02-14 RX ADMIN — SENNA PLUS 2 TABLET(S): 8.6 TABLET ORAL at 20:48

## 2021-02-14 RX ADMIN — Medication 81 MILLIGRAM(S): at 11:41

## 2021-02-14 RX ADMIN — Medication 100 MILLIGRAM(S): at 05:07

## 2021-02-14 RX ADMIN — OXYCODONE HYDROCHLORIDE 10 MILLIGRAM(S): 5 TABLET ORAL at 15:50

## 2021-02-14 RX ADMIN — Medication 25 MILLIGRAM(S): at 09:22

## 2021-02-14 RX ADMIN — CHLORHEXIDINE GLUCONATE 5 MILLILITER(S): 213 SOLUTION TOPICAL at 05:06

## 2021-02-14 RX ADMIN — ATORVASTATIN CALCIUM 80 MILLIGRAM(S): 80 TABLET, FILM COATED ORAL at 20:48

## 2021-02-14 RX ADMIN — OXYCODONE HYDROCHLORIDE 10 MILLIGRAM(S): 5 TABLET ORAL at 21:55

## 2021-02-14 RX ADMIN — Medication 50 MILLIGRAM(S): at 16:34

## 2021-02-14 RX ADMIN — SODIUM CHLORIDE 3 MILLILITER(S): 9 INJECTION INTRAMUSCULAR; INTRAVENOUS; SUBCUTANEOUS at 20:37

## 2021-02-14 RX ADMIN — GABAPENTIN 300 MILLIGRAM(S): 400 CAPSULE ORAL at 05:07

## 2021-02-14 RX ADMIN — PANTOPRAZOLE SODIUM 40 MILLIGRAM(S): 20 TABLET, DELAYED RELEASE ORAL at 11:41

## 2021-02-14 RX ADMIN — Medication 250 MILLIGRAM(S): at 06:16

## 2021-02-14 NOTE — PROGRESS NOTE ADULT - ASSESSMENT
70 year old Male with a medical history of CAD s/p prior PCI to RCA, LAD, and diag, presented with symptomatic chest pain.  +Multivessel CAD found on cardiac cath 2/10/21. Now s/p C3L with Dr. Izquierdo 2/12/21.

## 2021-02-14 NOTE — PROGRESS NOTE ADULT - SUBJECTIVE AND OBJECTIVE BOX
[place] [facility] - [facility], THE HEART CENTER                                   [address] [place] [address]                                                      PHONE: [phone]                                                         FAX: [phone]  [email or web address]  ---------------------------------------------------------------------------------------------------------------------------------    Overnight events/patient complaints: patient seen at bedside. He feels very tired.       No Known Allergies    MEDICATIONS  (STANDING):  aspirin enteric coated 81 milliGRAM(s) Oral daily  atorvastatin 80 milliGRAM(s) Oral at bedtime  chlorhexidine 0.12% Liquid 5 milliLiter(s) Oral Mucosa two times a day  clopidogrel Tablet 75 milliGRAM(s) Oral daily  enoxaparin Injectable 40 milliGRAM(s) SubCutaneous daily  gabapentin 300 milliGRAM(s) Oral three times a day  influenza   Vaccine 0.5 milliLiter(s) IntraMuscular once  insulin lispro (ADMELOG) corrective regimen sliding scale   SubCutaneous Before meals and at bedtime  lidocaine   Patch 1 Patch Transdermal daily  metoprolol tartrate 50 milliGRAM(s) Oral every 12 hours  pantoprazole    Tablet 40 milliGRAM(s) Oral daily  senna 2 Tablet(s) Oral at bedtime  sodium chloride 0.9% lock flush 3 milliLiter(s) IV Push every 8 hours  sodium chloride 0.9%. 1000 milliLiter(s) (10 mL/Hr) IV Continuous <Continuous>  sodium chloride 0.9%. 1000 milliLiter(s) (5 mL/Hr) IV Continuous <Continuous>    MEDICATIONS  (PRN):  oxyCODONE    IR 10 milliGRAM(s) Oral every 6 hours PRN Severe Pain (7 - 10)  oxyCODONE    IR 5 milliGRAM(s) Oral every 6 hours PRN Moderate Pain (4 - 6)      Vital Signs Last 24 Hrs  T(C): 37.2 (14 Feb 2021 06:00), Max: 37.2 (14 Feb 2021 06:00)  T(F): 98.9 (14 Feb 2021 06:00), Max: 98.9 (14 Feb 2021 06:00)  HR: 85 (14 Feb 2021 10:00) (80 - 101)  BP: 122/59 (14 Feb 2021 10:00) (113/57 - 132/68)  BP(mean): 83 (14 Feb 2021 10:00) (83 - 90)  RR: 16 (14 Feb 2021 10:00) (16 - 32)  SpO2: 92% (14 Feb 2021 10:00) (91% - 98%)  ICU Vital Signs Last 24 Hrs  KIRSTEN KAUFMAN  I&O's Detail    13 Feb 2021 07:01  -  14 Feb 2021 07:00  --------------------------------------------------------  IN:    Insulin: 9.5 mL    IV PiggyBack: 100 mL    IV PiggyBack: 500 mL    IV PiggyBack: 100 mL    Oral Fluid: 180 mL    sodium chloride 0.9%: 230 mL    sodium chloride 0.9%: 120 mL  Total IN: 1239.5 mL    OUT:    Chest Tube (mL): 10 mL    Chest Tube (mL): 190 mL    Indwelling Catheter - Urethral (mL): 1185 mL  Total OUT: 1385 mL    Total NET: -145.5 mL      14 Feb 2021 07:01  -  14 Feb 2021 12:00  --------------------------------------------------------  IN:  Total IN: 0 mL    OUT:    Chest Tube (mL): 50 mL    sodium chloride 0.9%: 0 mL    sodium chloride 0.9%: 0 mL    Voided (mL): 200 mL  Total OUT: 250 mL    Total NET: -250 mL        Drug Dosing Weight  KIRSTEN KAUFMAN      PHYSICAL EXAM:  General: Appears well developed, well nourished alert and cooperative.  HEENT: Head; normocephalic, atraumatic.  Eyes: Pupils reactive, cornea wnl.  Neck: Supple, no nodes adenopathy, no NVD or carotid bruit or thyromegaly.  CARDIOVASCULAR: Normal S1 and S2, No murmur, rub, gallop or lift.   LUNGS: reduced breath sounds b/l  ABDOMEN: Soft, nontender without mass or organomegaly. bowel sounds normoactive.  EXTREMITIES: No clubbing, cyanosis or edema. Distal pulses wnl.   SKIN: warm and dry with normal turgor.  NEURO: Alert/oriented x 3/normal motor exam. No pathologic reflexes.    PSYCH: normal affect.        LABS:                        11.4   16.67 )-----------( 138      ( 14 Feb 2021 03:02 )             35.5     02-14    139  |  106  |  24.0<H>  ----------------------------<  129<H>  4.5   |  22.0  |  0.75    Ca    8.8      14 Feb 2021 03:02  Mg     2.4     02-14    TPro  5.4<L>  /  Alb  3.3  /  TBili  0.6  /  DBili  x   /  AST  63<H>  /  ALT  18  /  AlkPhos  71  02-12    KIRSTEN KAUFMAN  CARDIAC MARKERS ( 13 Feb 2021 15:34 )  x     / 1.08 ng/mL / 490 U/L / x     / 25.0 ng/mL  CARDIAC MARKERS ( 13 Feb 2021 11:34 )  x     / 1.13 ng/mL / 605 U/L / x     / 37.6 ng/mL  CARDIAC MARKERS ( 13 Feb 2021 08:26 )  x     / 1.16 ng/mL / 673 U/L / x     / 46.9 ng/mL      PT/INR - ( 12 Feb 2021 14:17 )   PT: 14.4 sec;   INR: 1.25 ratio               RADIOLOGY & ADDITIONAL STUDIES:    INTERPRETATION OF TELEMETRY (personally reviewed): sinus rhythm       ASSESSMENT AND PLAN:  In summary, KIRSTEN KAUFMAN is an 70y Male with past medical history significant for CAD/MI remote PCI to LAD/RCA lasted cardiac cath Prior stent in LAD is patent with 100% occluded RCA in which RCA was treated with thrombectomy/PTCA/STENT (STEPHAN-Resolute) good results, HTN, HLD, Tobacco Abuse who presents [facility] ER c/o left sided Chest Pain which began acutely the morning at rest and was relieved at that time by ASA 325mg PO but returned later in the day.     NSTEMI, Severe 3V CAD, PCI, Smoker, HTN, HLD -- Cath showed severe multivessel CAD with LAD in-stent re-stenosis and he is now s/p 3V CABG (SVG-Ramus, SVG-RPDA, LIMA-LAD) on 2/12/21.  - continue ASA 81 mg, Plavix 75, and high dose statin therapy  - continue Lopressor 50 mg BID  - continue telemetry  - supportive care and chest tube management CT ICU    Will follow closely with you.

## 2021-02-14 NOTE — PROGRESS NOTE ADULT - SUBJECTIVE AND OBJECTIVE BOX
Subjective:  71yo M resting comfortable, pain well controlled.      HPI:  71 y/o M with PMHX CAD/MI s/p PCI x9, HTN, HLD, Hx Sinus Pauses, Tobacco Abuse presents to [facility] ER c/o L sided Chest Pain which began acutely the morning PTA at rest and was relieved at that time by ASA 325mg PO but returned later in the day. CP radiates from L hand and across chest. Similar in description to CP 2 years ago for which he had stents placed. Relieved with SL Nitro 0.4mg x1 in ER. +associated Palpitations. No diaphoresis. No associated SOB/BARRERA. Denies cough. Active Smoker but quit smoking 3 days ago. Has not seen Cardio for past 2 years. Compliant with medications. Plavix dc'd by Cardio last post-PCI. +Hx prior PCI to RCA, LAD, and Diag with repeat RCA occlusion in 2018 s/p 2 more PCI STEPHAN (total 9 stents per patient).     ROS negative unless mentioned above. (10 Feb 2021 03:06)          PAST MEDICAL & SURGICAL HISTORY:  CAD in native artery    Sinus pause    HLD (hyperlipidemia)    HTN (hypertension)    Stented coronary artery  x9            MEDICATIONS  (STANDING):  aspirin enteric coated 81 milliGRAM(s) Oral daily  atorvastatin 80 milliGRAM(s) Oral at bedtime  cefuroxime  IVPB 1500 milliGRAM(s) IV Intermittent every 8 hours  chlorhexidine 0.12% Liquid 5 milliLiter(s) Oral Mucosa two times a day  clopidogrel Tablet 75 milliGRAM(s) Oral daily  dextrose 50% Injectable 50 milliLiter(s) IV Push every 15 minutes  dextrose 50% Injectable 25 milliLiter(s) IV Push every 15 minutes  enoxaparin Injectable 40 milliGRAM(s) SubCutaneous daily  gabapentin 300 milliGRAM(s) Oral three times a day  influenza   Vaccine 0.5 milliLiter(s) IntraMuscular once  insulin lispro (ADMELOG) corrective regimen sliding scale   SubCutaneous Before meals and at bedtime  lidocaine   Patch 1 Patch Transdermal daily  metoprolol tartrate 25 milliGRAM(s) Oral two times a day  pantoprazole    Tablet 40 milliGRAM(s) Oral daily  senna 2 Tablet(s) Oral at bedtime  sodium chloride 0.9%. 1000 milliLiter(s) (10 mL/Hr) IV Continuous <Continuous>  sodium chloride 0.9%. 1000 milliLiter(s) (5 mL/Hr) IV Continuous <Continuous>  vancomycin  IVPB 1000 milliGRAM(s) IV Intermittent every 12 hours    MEDICATIONS  (PRN):  oxyCODONE    IR 10 milliGRAM(s) Oral every 6 hours PRN Severe Pain (7 - 10)  oxyCODONE    IR 5 milliGRAM(s) Oral every 6 hours PRN Moderate Pain (4 - 6)          Allergies    No Known Allergies    Intolerances          WEIGHTS:  Daily     Daily   Admit Wt: Drug Dosing Weight  Height (cm): 182.9 (12 Feb 2021 06:45)  Weight (kg): 108.9 (12 Feb 2021 06:45)  BMI (kg/m2): 32.6 (12 Feb 2021 06:45)  BSA (m2): 2.3 (12 Feb 2021 06:45)  I&O's Summary    12 Feb 2021 07:01  -  13 Feb 2021 07:00  --------------------------------------------------------  IN: 1822 mL / OUT: 2375 mL / NET: -553 mL    13 Feb 2021 07:01  -  14 Feb 2021 03:52  --------------------------------------------------------  IN: 769.5 mL / OUT: 645 mL / NET: 124.5 mL        VITAL SIGNS:  ICU Vital Signs Last 24 Hrs  T(C): 37.2 (13 Feb 2021 12:00), Max: 37.3 (13 Feb 2021 08:00)  T(F): 98.9 (13 Feb 2021 12:00), Max: 99.1 (13 Feb 2021 08:00)  HR: 92 (14 Feb 2021 03:40) (70 - 101)  BP: --  BP(mean): --  ABP: 130/52 (14 Feb 2021 03:00) (102/48 - 157/60)  ABP(mean): 76 (14 Feb 2021 03:00) (65 - 88)  RR: 23 (14 Feb 2021 03:00) (14 - 32)  SpO2: 98% (14 Feb 2021 03:40) (91% - 100%)        All laboratory results, radiology and medications reviewed.    LABS:  02-14    139  |  106  |  24.0<H>  ----------------------------<  129<H>  4.5   |  22.0  |  0.75    Ca    8.8      14 Feb 2021 03:02  Mg     2.4     02-14    TPro  5.4<L>  /  Alb  3.3  /  TBili  0.6  /  DBili  x   /  AST  63<H>  /  ALT  18  /  AlkPhos  71  02-12                                 11.4   16.67 )-----------( 138      ( 14 Feb 2021 03:02 )             35.5          PT/INR - ( 12 Feb 2021 14:17 )   PT: 14.4 sec;   INR: 1.25 ratio             ABG - ( 12 Feb 2021 22:37 )  pH, Arterial: 7.36  pH, Blood: x     /  pCO2: 46    /  pO2: 109   / HCO3: 25    / Base Excess: 0.3   /  SaO2: 98                CARDIAC MARKERS ( 13 Feb 2021 15:34 )  x     / 1.08 ng/mL / 490 U/L / x     / 25.0 ng/mL  CARDIAC MARKERS ( 13 Feb 2021 11:34 )  x     / 1.13 ng/mL / 605 U/L / x     / 37.6 ng/mL  CARDIAC MARKERS ( 13 Feb 2021 08:26 )  x     / 1.16 ng/mL / 673 U/L / x     / 46.9 ng/mL      CAPILLARY BLOOD GLUCOSE      POCT Blood Glucose.: 128 mg/dL (14 Feb 2021 00:13)  POCT Blood Glucose.: 118 mg/dL (13 Feb 2021 17:19)  POCT Blood Glucose.: 121 mg/dL (13 Feb 2021 13:03)  POCT Blood Glucose.: 113 mg/dL (13 Feb 2021 11:05)  POCT Blood Glucose.: 116 mg/dL (13 Feb 2021 09:47)  POCT Blood Glucose.: 101 mg/dL (13 Feb 2021 09:02)  POCT Blood Glucose.: 109 mg/dL (13 Feb 2021 08:03)  POCT Blood Glucose.: 121 mg/dL (13 Feb 2021 05:57)             PHYSICAL EXAM:  General:  well nourished, no acute distress  Neurology:  alert and oriented X 4, nonfocal, no gross deficits  Respiratory:  clear to auscultation bilaterally  CV:  regular rate and rhythm, normal S1 S2  Abdomen:  soft, nontender, nondistended, positive bowel sounds  Extremities:  warm, well perfused, no edema +DP pulses  Incisions:  midline sternal incision, c/d/i, sternum stable

## 2021-02-15 LAB
ANION GAP SERPL CALC-SCNC: 12 MMOL/L — SIGNIFICANT CHANGE UP (ref 5–17)
BUN SERPL-MCNC: 21 MG/DL — HIGH (ref 8–20)
CALCIUM SERPL-MCNC: 8.6 MG/DL — SIGNIFICANT CHANGE UP (ref 8.6–10.2)
CHLORIDE SERPL-SCNC: 103 MMOL/L — SIGNIFICANT CHANGE UP (ref 98–107)
CO2 SERPL-SCNC: 24 MMOL/L — SIGNIFICANT CHANGE UP (ref 22–29)
CREAT SERPL-MCNC: 0.7 MG/DL — SIGNIFICANT CHANGE UP (ref 0.5–1.3)
GLUCOSE BLDC GLUCOMTR-MCNC: 107 MG/DL — HIGH (ref 70–99)
GLUCOSE BLDC GLUCOMTR-MCNC: 109 MG/DL — HIGH (ref 70–99)
GLUCOSE BLDC GLUCOMTR-MCNC: 118 MG/DL — HIGH (ref 70–99)
GLUCOSE BLDC GLUCOMTR-MCNC: 121 MG/DL — HIGH (ref 70–99)
GLUCOSE BLDC GLUCOMTR-MCNC: 125 MG/DL — HIGH (ref 70–99)
GLUCOSE SERPL-MCNC: 97 MG/DL — SIGNIFICANT CHANGE UP (ref 70–99)
HCT VFR BLD CALC: 35 % — LOW (ref 39–50)
HGB BLD-MCNC: 11.2 G/DL — LOW (ref 13–17)
MAGNESIUM SERPL-MCNC: 2.4 MG/DL — SIGNIFICANT CHANGE UP (ref 1.6–2.6)
MCHC RBC-ENTMCNC: 29.1 PG — SIGNIFICANT CHANGE UP (ref 27–34)
MCHC RBC-ENTMCNC: 32 GM/DL — SIGNIFICANT CHANGE UP (ref 32–36)
MCV RBC AUTO: 90.9 FL — SIGNIFICANT CHANGE UP (ref 80–100)
PLATELET # BLD AUTO: 130 K/UL — LOW (ref 150–400)
POTASSIUM SERPL-MCNC: 4 MMOL/L — SIGNIFICANT CHANGE UP (ref 3.5–5.3)
POTASSIUM SERPL-SCNC: 4 MMOL/L — SIGNIFICANT CHANGE UP (ref 3.5–5.3)
RBC # BLD: 3.85 M/UL — LOW (ref 4.2–5.8)
RBC # FLD: 14.2 % — SIGNIFICANT CHANGE UP (ref 10.3–14.5)
SODIUM SERPL-SCNC: 139 MMOL/L — SIGNIFICANT CHANGE UP (ref 135–145)
TSI ACT/NOR SER: 0.38 IU/L — SIGNIFICANT CHANGE UP (ref 0–0.55)
WBC # BLD: 13.05 K/UL — HIGH (ref 3.8–10.5)
WBC # FLD AUTO: 13.05 K/UL — HIGH (ref 3.8–10.5)

## 2021-02-15 PROCEDURE — 93010 ELECTROCARDIOGRAM REPORT: CPT

## 2021-02-15 PROCEDURE — 71045 X-RAY EXAM CHEST 1 VIEW: CPT | Mod: 26

## 2021-02-15 RX ADMIN — POLYETHYLENE GLYCOL 3350 17 GRAM(S): 17 POWDER, FOR SOLUTION ORAL at 16:33

## 2021-02-15 RX ADMIN — GABAPENTIN 300 MILLIGRAM(S): 400 CAPSULE ORAL at 04:25

## 2021-02-15 RX ADMIN — PANTOPRAZOLE SODIUM 40 MILLIGRAM(S): 20 TABLET, DELAYED RELEASE ORAL at 04:27

## 2021-02-15 RX ADMIN — SODIUM CHLORIDE 3 MILLILITER(S): 9 INJECTION INTRAMUSCULAR; INTRAVENOUS; SUBCUTANEOUS at 12:38

## 2021-02-15 RX ADMIN — SODIUM CHLORIDE 3 MILLILITER(S): 9 INJECTION INTRAMUSCULAR; INTRAVENOUS; SUBCUTANEOUS at 20:25

## 2021-02-15 RX ADMIN — OXYCODONE HYDROCHLORIDE 10 MILLIGRAM(S): 5 TABLET ORAL at 16:33

## 2021-02-15 RX ADMIN — OXYCODONE HYDROCHLORIDE 10 MILLIGRAM(S): 5 TABLET ORAL at 10:24

## 2021-02-15 RX ADMIN — SENNA PLUS 2 TABLET(S): 8.6 TABLET ORAL at 20:23

## 2021-02-15 RX ADMIN — OXYCODONE HYDROCHLORIDE 10 MILLIGRAM(S): 5 TABLET ORAL at 04:24

## 2021-02-15 RX ADMIN — ENOXAPARIN SODIUM 40 MILLIGRAM(S): 100 INJECTION SUBCUTANEOUS at 20:24

## 2021-02-15 RX ADMIN — POLYETHYLENE GLYCOL 3350 17 GRAM(S): 17 POWDER, FOR SOLUTION ORAL at 04:26

## 2021-02-15 RX ADMIN — SODIUM CHLORIDE 3 MILLILITER(S): 9 INJECTION INTRAMUSCULAR; INTRAVENOUS; SUBCUTANEOUS at 04:28

## 2021-02-15 RX ADMIN — ATORVASTATIN CALCIUM 80 MILLIGRAM(S): 80 TABLET, FILM COATED ORAL at 20:24

## 2021-02-15 RX ADMIN — OXYCODONE HYDROCHLORIDE 10 MILLIGRAM(S): 5 TABLET ORAL at 23:54

## 2021-02-15 RX ADMIN — GABAPENTIN 300 MILLIGRAM(S): 400 CAPSULE ORAL at 20:23

## 2021-02-15 RX ADMIN — Medication 50 MILLIGRAM(S): at 16:32

## 2021-02-15 RX ADMIN — CLOPIDOGREL BISULFATE 75 MILLIGRAM(S): 75 TABLET, FILM COATED ORAL at 10:22

## 2021-02-15 RX ADMIN — Medication 50 MILLIGRAM(S): at 04:25

## 2021-02-15 RX ADMIN — GABAPENTIN 300 MILLIGRAM(S): 400 CAPSULE ORAL at 12:41

## 2021-02-15 RX ADMIN — Medication 81 MILLIGRAM(S): at 10:22

## 2021-02-15 NOTE — PROGRESS NOTE ADULT - SUBJECTIVE AND OBJECTIVE BOX
Brief summary:  71 y/o M with PMHX CAD/MI s/p PCI x9, HTN, HLD, Hx Sinus Pauses, Tobacco Abuse presents to [facility] ER +NSTEMI, c/o L sided Chest Pain which began 2/10 at rest and was relieved at that time by ASA 325mg PO but returned later in the day. CP radiates from L hand and across chest. Similar in description to CP 2 years ago for which he had stents placed. Pt is an Active Smoker but quit smoking 3 days ago. Has not seen Cardio for past 2 years.  +Hx prior PCI to RCA, LAD, and Diag with repeat RCA occlusion in 2018 s/p 2 more PCI STEPHAN (total 9 stents per patient.  Pt is now POD# 2  s/p C3 L w/ Dr. Izquierdo on 2/11     SUBJECTIVE:  Pt sitting in bed alert and orientedX 3. Pt states, " I am Ok, just a little soreness around my chest".  Pt c/o of incisional pain but denies chest pain, shortness of breath, palpitations, headache, dizziness, nausea, or vomiting.      PAST MEDICAL & SURGICAL HISTORY:  CAD in native artery    Sinus pause    HLD (hyperlipidemia)    HTN (hypertension)    Stented coronary artery  x9        MEDICATIONS  aspirin enteric coated 81 milliGRAM(s) Oral daily  atorvastatin 80 milliGRAM(s) Oral at bedtime  clopidogrel Tablet 75 milliGRAM(s) Oral daily  enoxaparin Injectable 40 milliGRAM(s) SubCutaneous daily  gabapentin 300 milliGRAM(s) Oral three times a day  influenza   Vaccine 0.5 milliLiter(s) IntraMuscular once  insulin lispro (ADMELOG) corrective regimen sliding scale   SubCutaneous Before meals and at bedtime  lidocaine   Patch 1 Patch Transdermal daily  metoprolol tartrate 50 milliGRAM(s) Oral every 12 hours  oxyCODONE    IR 10 milliGRAM(s) Oral every 6 hours PRN  oxyCODONE    IR 5 milliGRAM(s) Oral every 6 hours PRN  pantoprazole    Tablet 40 milliGRAM(s) Oral daily  polyethylene glycol 3350 17 Gram(s) Oral two times a day  senna 2 Tablet(s) Oral at bedtime  sodium chloride 0.9% lock flush 3 milliLiter(s) IV Push every 8 hours  MEDICATIONS  (PRN):  oxyCODONE    IR 10 milliGRAM(s) Oral every 6 hours PRN Severe Pain (7 - 10)  oxyCODONE    IR 5 milliGRAM(s) Oral every 6 hours PRN Moderate Pain (4 - 6)      Daily                             11.4   16.67 )-----------( 138      ( 14 Feb 2021 03:02 )             35.5   02-14    139  |  106  |  24.0<H>  ----------------------------<  129<H>  4.5   |  22.0  |  0.75    Ca    8.8      14 Feb 2021 03:02  Mg     2.4     02-14      CARDIAC MARKERS ( 13 Feb 2021 15:34 )  x     / 1.08 ng/mL / 490 U/L / x     / 25.0 ng/mL  CARDIAC MARKERS ( 13 Feb 2021 11:34 )  x     / 1.13 ng/mL / 605 U/L / x     / 37.6 ng/mL  CARDIAC MARKERS ( 13 Feb 2021 08:26 )  x     / 1.16 ng/mL / 673 U/L / x     / 46.9 ng/mL          Objective:  T(C): 37.2 (02-14-21 @ 20:35), Max: 37.3 (02-14-21 @ 12:00)  HR: 77 (02-14-21 @ 21:48) (77 - 97)  BP: 124/73 (02-14-21 @ 20:35) (113/57 - 145/72)  RR: 16 (02-14-21 @ 20:35) (15 - 32)  SpO2: 100% (02-14-21 @ 21:48) (92% - 100%)  Wt(kg): --CAPILLARY BLOOD GLUCOSE      POCT Blood Glucose.: 107 mg/dL (14 Feb 2021 21:44)  POCT Blood Glucose.: 118 mg/dL (14 Feb 2021 11:40)  I&O's Summary    13 Feb 2021 07:01  -  14 Feb 2021 07:00  --------------------------------------------------------  IN: 1239.5 mL / OUT: 1385 mL / NET: -145.5 mL    14 Feb 2021 07:01  -  15 Feb 2021 02:20  --------------------------------------------------------  IN: 720 mL / OUT: 1060 mL / NET: -340 mL        Physical Exam  Neuro: A+O x 3, non-focal, speech clear and intact  Pulm: CTA, equal bilaterally  CV: RRR, +S1S2  Abd: soft, NT, ND, +BS  Ext: +DP Pulses b/l, +PT pulses, b/l LE edma  Inc: MSI C/D/I/stable w/ dressing Mepilex AG, Left EVH mepliex AG C/D/I with Ace wrap  Chest tubes: Pericardial tisha C/D/I    Imaging:  CXR:  < from: Xray Chest 1 View- PORTABLE-Routine (02.14.21 @ 06:58) >  IMPRESSION:    Small bilateral pleural effusions and bibasilar atelectasis.    < end of copied text >      ECG:  < from: 12 Lead ECG (02.13.21 @ 15:30) >    Ventricular Rate 93 BPM    Atrial Rate 93 BPM    P-R Interval 162 ms    QRS Duration 134 ms    Q-T Interval 374 ms    QTC Calculation(Bazett) 465 ms    P Axis 48 degrees    R Axis 6 degrees    T Axis -11 degrees    Diagnosis Line Normal sinus rhythm  Incomplete RBBB  Inferior infarct , age undetermined    < end of copied text >               Brief summary:  69 y/o M with PMHX CAD/MI s/p PCI x9, HTN, HLD, Hx Sinus Pauses, Tobacco Abuse presents to [facility] ER +NSTEMI, c/o L sided Chest Pain which began 2/10 at rest and was relieved at that time by ASA 325mg PO but returned later in the day. CP radiates from L hand and across chest. Similar in description to CP 2 years ago for which he had stents placed. Pt is an Active Smoker but quit smoking 3 days ago. Has not seen Cardio for past 2 years.  +Hx prior PCI to RCA, LAD, and Diag with repeat RCA occlusion in 2018 s/p 2 more PCI STEPHAN (total 9 stents per patient.  Pt is now POD# 4  s/p C3 L w/ Dr. Izquierdo on 2/11     SUBJECTIVE:  Pt sitting in bed alert and orientedX 3. Pt states, " I am Ok, just a little soreness around my chest".  Pt c/o of incisional pain but denies chest pain, shortness of breath, palpitations, headache, dizziness, nausea, or vomiting.      PAST MEDICAL & SURGICAL HISTORY:  CAD in native artery    Sinus pause    HLD (hyperlipidemia)    HTN (hypertension)    Stented coronary artery  x9        MEDICATIONS  aspirin enteric coated 81 milliGRAM(s) Oral daily  atorvastatin 80 milliGRAM(s) Oral at bedtime  clopidogrel Tablet 75 milliGRAM(s) Oral daily  enoxaparin Injectable 40 milliGRAM(s) SubCutaneous daily  gabapentin 300 milliGRAM(s) Oral three times a day  influenza   Vaccine 0.5 milliLiter(s) IntraMuscular once  insulin lispro (ADMELOG) corrective regimen sliding scale   SubCutaneous Before meals and at bedtime  lidocaine   Patch 1 Patch Transdermal daily  metoprolol tartrate 50 milliGRAM(s) Oral every 12 hours  oxyCODONE    IR 10 milliGRAM(s) Oral every 6 hours PRN  oxyCODONE    IR 5 milliGRAM(s) Oral every 6 hours PRN  pantoprazole    Tablet 40 milliGRAM(s) Oral daily  polyethylene glycol 3350 17 Gram(s) Oral two times a day  senna 2 Tablet(s) Oral at bedtime  sodium chloride 0.9% lock flush 3 milliLiter(s) IV Push every 8 hours  MEDICATIONS  (PRN):  oxyCODONE    IR 10 milliGRAM(s) Oral every 6 hours PRN Severe Pain (7 - 10)  oxyCODONE    IR 5 milliGRAM(s) Oral every 6 hours PRN Moderate Pain (4 - 6)      Daily                             11.4   16.67 )-----------( 138      ( 14 Feb 2021 03:02 )             35.5   02-14    139  |  106  |  24.0<H>  ----------------------------<  129<H>  4.5   |  22.0  |  0.75    Ca    8.8      14 Feb 2021 03:02  Mg     2.4     02-14      CARDIAC MARKERS ( 13 Feb 2021 15:34 )  x     / 1.08 ng/mL / 490 U/L / x     / 25.0 ng/mL  CARDIAC MARKERS ( 13 Feb 2021 11:34 )  x     / 1.13 ng/mL / 605 U/L / x     / 37.6 ng/mL  CARDIAC MARKERS ( 13 Feb 2021 08:26 )  x     / 1.16 ng/mL / 673 U/L / x     / 46.9 ng/mL          Objective:  T(C): 37.2 (02-14-21 @ 20:35), Max: 37.3 (02-14-21 @ 12:00)  HR: 77 (02-14-21 @ 21:48) (77 - 97)  BP: 124/73 (02-14-21 @ 20:35) (113/57 - 145/72)  RR: 16 (02-14-21 @ 20:35) (15 - 32)  SpO2: 100% (02-14-21 @ 21:48) (92% - 100%)  Wt(kg): --CAPILLARY BLOOD GLUCOSE      POCT Blood Glucose.: 107 mg/dL (14 Feb 2021 21:44)  POCT Blood Glucose.: 118 mg/dL (14 Feb 2021 11:40)  I&O's Summary    13 Feb 2021 07:01  -  14 Feb 2021 07:00  --------------------------------------------------------  IN: 1239.5 mL / OUT: 1385 mL / NET: -145.5 mL    14 Feb 2021 07:01  -  15 Feb 2021 02:20  --------------------------------------------------------  IN: 720 mL / OUT: 1060 mL / NET: -340 mL        Physical Exam  Neuro: A+O x 3, non-focal, speech clear and intact  Pulm: CTA, equal bilaterally  CV: RRR, +S1S2  Abd: soft, NT, ND, +BS  Ext: +DP Pulses b/l, +PT pulses, b/l LE edma  Inc: MSI C/D/I/stable w/ dressing Mepilex AG, Left EVH mepliex AG C/D/I with Ace wrap  Chest tubes: Pericardial tisha C/D/I    Imaging:  CXR:  < from: Xray Chest 1 View- PORTABLE-Routine (02.14.21 @ 06:58) >  IMPRESSION:    Small bilateral pleural effusions and bibasilar atelectasis.    < end of copied text >      ECG:  < from: 12 Lead ECG (02.13.21 @ 15:30) >    Ventricular Rate 93 BPM    Atrial Rate 93 BPM    P-R Interval 162 ms    QRS Duration 134 ms    Q-T Interval 374 ms    QTC Calculation(Bazett) 465 ms    P Axis 48 degrees    R Axis 6 degrees    T Axis -11 degrees    Diagnosis Line Normal sinus rhythm  Incomplete RBBB  Inferior infarct , age undetermined    < end of copied text >

## 2021-02-15 NOTE — CHART NOTE - NSCHARTNOTEFT_GEN_A_CORE
CTS Addendnum Note;      Briefly, 70 year old male with a PMH of CAD/MI s/p PCI x9, HTN, HLD, Hx Sinus Pauses, Tobacco Abuse presents to [facility] ER +NSTEMI, c/o L sided Chest Pain which began 2/10 at rest and was relieved at that time by ASA 325mg PO but returned later in the day. CP radiates from L hand and across chest. Similar in description to CP 2 years ago for which he had stents placed. Pt is an Active Smoker but quit smoking 3 days ago. Has not seen Cardio for past 2 years.  +Hx prior PCI to RCA, LAD, and Diag with repeat RCA occlusion in 2018 s/p 2 more PCI STEPHAN (total 9 stents per patient.  Pt is now POD#3 s/p C3 LIMA w/ Dr. Izquierdo on 2/11.  No post -op complications       Patient seen & examined, vitals stable, medications & radiologic labs reviewed.  Sinus rhythm as per telemetry. Pericardial tisha to pleural vac draining serous sang drainage.        Plan:  Continue Oxy PRN, lidoderm, & neurontin for pain management  Continue ASA, Plavix, & Lipitor for CAD  Continue Lopressor  Encourage the use of CDBE, I/S, OOB to chair, daily PT, ambulate with assist  D/C Bipap as per Dr. Crane  Maintain pericardial tisha for increased drainage  Continue senna & miralax for bowel regimen  Continue Lovenox & SCD's for DVT prophylaxis  Continue Ensure supplementation for nutritional support  Discussed plan with Dr. Crane & CTS in morning rounds.

## 2021-02-15 NOTE — PROGRESS NOTE ADULT - ASSESSMENT
70 year old Male with a medical history of CAD s/p prior PCI to RCA, LAD, and diag, presented with symptomatic chest pain, +NSTEMI s/p cardiac cath 2/10/21 indicating MVD. Now s/p C3L with Dr. Izquierdo 2/12/21.

## 2021-02-15 NOTE — PROGRESS NOTE ADULT - SUBJECTIVE AND OBJECTIVE BOX
[facility] - [facility]                                   [address] [place] [address]                                                      PHONE: [phone]                                                         FAX: [phone]  [email or web address]  ---------------------------------------------------------------------------------------------------------------------------------    Overnight events/patient complaints:  NAD feeling well today     No Known Allergies    MEDICATIONS  (STANDING):  aspirin enteric coated 81 milliGRAM(s) Oral daily  atorvastatin 80 milliGRAM(s) Oral at bedtime  clopidogrel Tablet 75 milliGRAM(s) Oral daily  enoxaparin Injectable 40 milliGRAM(s) SubCutaneous daily  gabapentin 300 milliGRAM(s) Oral three times a day  insulin lispro (ADMELOG) corrective regimen sliding scale   SubCutaneous Before meals and at bedtime  lidocaine   Patch 1 Patch Transdermal daily  metoprolol tartrate 50 milliGRAM(s) Oral every 12 hours  pantoprazole    Tablet 40 milliGRAM(s) Oral daily  polyethylene glycol 3350 17 Gram(s) Oral two times a day  senna 2 Tablet(s) Oral at bedtime  sodium chloride 0.9% lock flush 3 milliLiter(s) IV Push every 8 hours    MEDICATIONS  (PRN):  oxyCODONE    IR 10 milliGRAM(s) Oral every 6 hours PRN Severe Pain (7 - 10)  oxyCODONE    IR 5 milliGRAM(s) Oral every 6 hours PRN Moderate Pain (4 - 6)      Vital Signs Last 24 Hrs  T(C): 37 (15 Feb 2021 04:00), Max: 37.3 (14 Feb 2021 12:00)  T(F): 98.6 (15 Feb 2021 04:00), Max: 99.1 (14 Feb 2021 12:00)  HR: 80 (15 Feb 2021 04:28) (77 - 97)  BP: 135/72 (15 Feb 2021 04:28) (120/65 - 145/72)  BP(mean): 89 (14 Feb 2021 16:00) (83 - 89)  RR: 18 (15 Feb 2021 04:28) (15 - 18)  SpO2: 95% (15 Feb 2021 04:28) (92% - 100%)  ICU Vital Signs Last 24 Hrs  KIRSTEN KAUFMAN  I&O's Detail    14 Feb 2021 07:01  -  15 Feb 2021 07:00  --------------------------------------------------------  IN:    Oral Fluid: 840 mL  Total IN: 840 mL    OUT:    Chest Tube (mL): 300 mL    sodium chloride 0.9%: 0 mL    sodium chloride 0.9%: 0 mL    Voided (mL): 1250 mL  Total OUT: 1550 mL    Total NET: -710 mL        I&O's Summary    14 Feb 2021 07:01  -  15 Feb 2021 07:00  --------------------------------------------------------  IN: 840 mL / OUT: 1550 mL / NET: -710 mL      Drug Dosing Weight  KIRSTEN KAUFMAN      PHYSICAL EXAM:  General: Appears well developed, well nourished alert and cooperative.  HEENT: Head; normocephalic, atraumatic.  Eyes: Pupils reactive, cornea wnl.  Neck: Supple, no nodes adenopathy, no NVD or carotid bruit or thyromegaly.  CARDIOVASCULAR: Normal S1 and S2, No murmur, rub, gallop or lift.   LUNGS: Decrease BS B/L + chest tube   ABDOMEN: Soft, nontender without mass or organomegaly. bowel sounds normoactive.  EXTREMITIES: No clubbing, cyanosis or edema. Distal pulses wnl.   SKIN: warm and dry with normal turgor.  NEURO: Alert/oriented x 3/normal motor exam. No pathologic reflexes.    PSYCH: normal affect.        LABS:                        11.2   13.05 )-----------( 130      ( 15 Feb 2021 06:29 )             35.0     02-15    139  |  103  |  21.0<H>  ----------------------------<  97  4.0   |  24.0  |  0.70    Ca    8.6      15 Feb 2021 06:27  Mg     2.4     02-15      KIRSTEN KAUFMAN  CARDIAC MARKERS ( 13 Feb 2021 15:34 )  x     / 1.08 ng/mL / 490 U/L / x     / 25.0 ng/mL  CARDIAC MARKERS ( 13 Feb 2021 11:34 )  x     / 1.13 ng/mL / 605 U/L / x     / 37.6 ng/mL      RADIOLOGY & ADDITIONAL STUDIES:    INTERPRETATION OF TELEMETRY (personally reviewed):     70y Male with past medical history significant for CAD/MI remote PCI to LAD/RCA lasted cardiac cath Prior stent in LAD is patent with 100% occluded RCA in which RCA was treated with thrombectomy/PTCA/STENT (STEPHAN-Resolute) good results, HTN, HLD, Tobacco Abuse who presents [facility] ER c/o left sided Chest Pain which began acutely the morning at rest and was relieved at that time by ASA 325mg PO but returned later in the day; NSTEMI, Severe 3V CAD, PCI, Smoker, HTN, HLD -- Cath showed severe multivessel CAD with LAD in-stent re-stenosis and he is now s/p 3V CABG (SVG-Ramus, SVG-RPDA, LIMA-LAD) on 2/12/21.    Plan  1.  Continue DAPT therapy   2.  Continue current CV medications   3.  Supportive care and chest tube management CT ICU    Will follow closely with you.

## 2021-02-16 ENCOUNTER — TRANSCRIPTION ENCOUNTER (OUTPATIENT)
Age: 71
End: 2021-02-16

## 2021-02-16 LAB
ALBUMIN SERPL ELPH-MCNC: 2.9 G/DL — LOW (ref 3.3–5.2)
ALP SERPL-CCNC: 117 U/L — SIGNIFICANT CHANGE UP (ref 40–120)
ALT FLD-CCNC: 35 U/L — SIGNIFICANT CHANGE UP
ANION GAP SERPL CALC-SCNC: 9 MMOL/L — SIGNIFICANT CHANGE UP (ref 5–17)
AST SERPL-CCNC: 35 U/L — SIGNIFICANT CHANGE UP
BILIRUB SERPL-MCNC: 0.4 MG/DL — SIGNIFICANT CHANGE UP (ref 0.4–2)
BUN SERPL-MCNC: 23 MG/DL — HIGH (ref 8–20)
CALCIUM SERPL-MCNC: 8.5 MG/DL — LOW (ref 8.6–10.2)
CHLORIDE SERPL-SCNC: 104 MMOL/L — SIGNIFICANT CHANGE UP (ref 98–107)
CO2 SERPL-SCNC: 26 MMOL/L — SIGNIFICANT CHANGE UP (ref 22–29)
CREAT SERPL-MCNC: 0.69 MG/DL — SIGNIFICANT CHANGE UP (ref 0.5–1.3)
GLUCOSE BLDC GLUCOMTR-MCNC: 101 MG/DL — HIGH (ref 70–99)
GLUCOSE BLDC GLUCOMTR-MCNC: 104 MG/DL — HIGH (ref 70–99)
GLUCOSE BLDC GLUCOMTR-MCNC: 110 MG/DL — HIGH (ref 70–99)
GLUCOSE BLDC GLUCOMTR-MCNC: 122 MG/DL — HIGH (ref 70–99)
GLUCOSE SERPL-MCNC: 107 MG/DL — HIGH (ref 70–99)
HCT VFR BLD CALC: 33.1 % — LOW (ref 39–50)
HGB BLD-MCNC: 10.7 G/DL — LOW (ref 13–17)
MAGNESIUM SERPL-MCNC: 2.2 MG/DL — SIGNIFICANT CHANGE UP (ref 1.6–2.6)
MCHC RBC-ENTMCNC: 29 PG — SIGNIFICANT CHANGE UP (ref 27–34)
MCHC RBC-ENTMCNC: 32.3 GM/DL — SIGNIFICANT CHANGE UP (ref 32–36)
MCV RBC AUTO: 89.7 FL — SIGNIFICANT CHANGE UP (ref 80–100)
PHOSPHATE SERPL-MCNC: 2.6 MG/DL — SIGNIFICANT CHANGE UP (ref 2.4–4.7)
PLATELET # BLD AUTO: 153 K/UL — SIGNIFICANT CHANGE UP (ref 150–400)
POTASSIUM SERPL-MCNC: 4 MMOL/L — SIGNIFICANT CHANGE UP (ref 3.5–5.3)
POTASSIUM SERPL-SCNC: 4 MMOL/L — SIGNIFICANT CHANGE UP (ref 3.5–5.3)
PROT SERPL-MCNC: 5.7 G/DL — LOW (ref 6.6–8.7)
RBC # BLD: 3.69 M/UL — LOW (ref 4.2–5.8)
RBC # FLD: 13.9 % — SIGNIFICANT CHANGE UP (ref 10.3–14.5)
SODIUM SERPL-SCNC: 139 MMOL/L — SIGNIFICANT CHANGE UP (ref 135–145)
WBC # BLD: 11.19 K/UL — HIGH (ref 3.8–10.5)
WBC # FLD AUTO: 11.19 K/UL — HIGH (ref 3.8–10.5)

## 2021-02-16 PROCEDURE — 93010 ELECTROCARDIOGRAM REPORT: CPT

## 2021-02-16 PROCEDURE — 71045 X-RAY EXAM CHEST 1 VIEW: CPT | Mod: 26,77

## 2021-02-16 PROCEDURE — 71045 X-RAY EXAM CHEST 1 VIEW: CPT | Mod: 26

## 2021-02-16 RX ORDER — FUROSEMIDE 40 MG
40 TABLET ORAL ONCE
Refills: 0 | Status: COMPLETED | OUTPATIENT
Start: 2021-02-16 | End: 2021-02-16

## 2021-02-16 RX ADMIN — PANTOPRAZOLE SODIUM 40 MILLIGRAM(S): 20 TABLET, DELAYED RELEASE ORAL at 09:25

## 2021-02-16 RX ADMIN — GABAPENTIN 300 MILLIGRAM(S): 400 CAPSULE ORAL at 04:37

## 2021-02-16 RX ADMIN — Medication 40 MILLIGRAM(S): at 09:26

## 2021-02-16 RX ADMIN — ATORVASTATIN CALCIUM 80 MILLIGRAM(S): 80 TABLET, FILM COATED ORAL at 20:26

## 2021-02-16 RX ADMIN — SODIUM CHLORIDE 3 MILLILITER(S): 9 INJECTION INTRAMUSCULAR; INTRAVENOUS; SUBCUTANEOUS at 04:39

## 2021-02-16 RX ADMIN — SENNA PLUS 2 TABLET(S): 8.6 TABLET ORAL at 20:26

## 2021-02-16 RX ADMIN — ENOXAPARIN SODIUM 40 MILLIGRAM(S): 100 INJECTION SUBCUTANEOUS at 20:26

## 2021-02-16 RX ADMIN — OXYCODONE HYDROCHLORIDE 10 MILLIGRAM(S): 5 TABLET ORAL at 10:17

## 2021-02-16 RX ADMIN — GABAPENTIN 300 MILLIGRAM(S): 400 CAPSULE ORAL at 12:17

## 2021-02-16 RX ADMIN — Medication 50 MILLIGRAM(S): at 04:37

## 2021-02-16 RX ADMIN — Medication 81 MILLIGRAM(S): at 09:25

## 2021-02-16 RX ADMIN — SODIUM CHLORIDE 3 MILLILITER(S): 9 INJECTION INTRAMUSCULAR; INTRAVENOUS; SUBCUTANEOUS at 12:13

## 2021-02-16 RX ADMIN — GABAPENTIN 300 MILLIGRAM(S): 400 CAPSULE ORAL at 20:26

## 2021-02-16 RX ADMIN — OXYCODONE HYDROCHLORIDE 5 MILLIGRAM(S): 5 TABLET ORAL at 16:18

## 2021-02-16 RX ADMIN — SODIUM CHLORIDE 3 MILLILITER(S): 9 INJECTION INTRAMUSCULAR; INTRAVENOUS; SUBCUTANEOUS at 20:31

## 2021-02-16 RX ADMIN — Medication 50 MILLIGRAM(S): at 16:18

## 2021-02-16 RX ADMIN — OXYCODONE HYDROCHLORIDE 5 MILLIGRAM(S): 5 TABLET ORAL at 20:29

## 2021-02-16 RX ADMIN — CLOPIDOGREL BISULFATE 75 MILLIGRAM(S): 75 TABLET, FILM COATED ORAL at 09:25

## 2021-02-16 NOTE — PROGRESS NOTE ADULT - SUBJECTIVE AND OBJECTIVE BOX
[facility], [facility]                                   [address] [place] [address]                                                      PHONE: [phone]                                                         FAX: [phone]  [email or web address]  ---------------------------------------------------------------------------------------------------------------------------------    Overnight events/patient complaints:  NAD feeling well today     No Known Allergies    MEDICATIONS  (STANDING):  aspirin enteric coated 81 milliGRAM(s) Oral daily  atorvastatin 80 milliGRAM(s) Oral at bedtime  clopidogrel Tablet 75 milliGRAM(s) Oral daily  enoxaparin Injectable 40 milliGRAM(s) SubCutaneous daily  gabapentin 300 milliGRAM(s) Oral three times a day  insulin lispro (ADMELOG) corrective regimen sliding scale   SubCutaneous Before meals and at bedtime  lidocaine   Patch 1 Patch Transdermal daily  metoprolol tartrate 50 milliGRAM(s) Oral every 12 hours  pantoprazole    Tablet 40 milliGRAM(s) Oral daily  polyethylene glycol 3350 17 Gram(s) Oral two times a day  senna 2 Tablet(s) Oral at bedtime  sodium chloride 0.9% lock flush 3 milliLiter(s) IV Push every 8 hours    MEDICATIONS  (PRN):  oxyCODONE    IR 10 milliGRAM(s) Oral every 6 hours PRN Severe Pain (7 - 10)  oxyCODONE    IR 5 milliGRAM(s) Oral every 6 hours PRN Moderate Pain (4 - 6)      Vital Signs Last 24 Hrs  T(C): 36.7 (16 Feb 2021 08:16), Max: 36.9 (15 Feb 2021 15:40)  T(F): 98 (16 Feb 2021 08:16), Max: 98.5 (15 Feb 2021 16:33)  HR: 75 (16 Feb 2021 09:26) (75 - 88)  BP: 114/88 (16 Feb 2021 09:26) (113/78 - 140/85)  BP(mean): --  RR: 17 (16 Feb 2021 08:16) (17 - 18)  SpO2: 97% (16 Feb 2021 08:16) (97% - 99%)  ICU Vital Signs Last 24 Hrs  KIRSTEN KAUFMAN  I&O's Detail    15 Feb 2021 07:01  -  16 Feb 2021 07:00  --------------------------------------------------------  IN:    Oral Fluid: 720 mL  Total IN: 720 mL    OUT:    Chest Tube (mL): 20 mL    Voided (mL): 1800 mL  Total OUT: 1820 mL    Total NET: -1100 mL        I&O's Summary    15 Feb 2021 07:01  -  16 Feb 2021 07:00  --------------------------------------------------------  IN: 720 mL / OUT: 1820 mL / NET: -1100 mL      Drug Dosing Weight  KIRSTEN KAUFMAN      PHYSICAL EXAM:  General: Appears well developed, well nourished alert and cooperative.  HEENT: Head; normocephalic, atraumatic.  Eyes: Pupils reactive, cornea wnl.  Neck: Supple, no nodes adenopathy, no NVD or carotid bruit or thyromegaly.  CARDIOVASCULAR: Normal S1 and S2, 1/6 murmur, rub, gallop or lift.   LUNGS: Decrease BS B/L + chest tube   ABDOMEN: Soft, nontender without mass or organomegaly. bowel sounds normoactive.  EXTREMITIES: No clubbing, cyanosis + edema. Distal pulses wnl.   SKIN: warm and dry with normal turgor.  NEURO: Alert/oriented x 3/normal motor exam. No pathologic reflexes.    PSYCH: normal affect.        LABS:                        10.7   11.19 )-----------( 153      ( 16 Feb 2021 06:00 )             33.1     02-16    139  |  104  |  23.0<H>  ----------------------------<  107<H>  4.0   |  26.0  |  0.69    Ca    8.5<L>      16 Feb 2021 06:00  Phos  2.6     02-16  Mg     2.2     02-16    TPro  5.7<L>  /  Alb  2.9<L>  /  TBili  0.4  /  DBili  x   /  AST  35  /  ALT  35  /  AlkPhos  117  02-16    KIRSTEN KAUFMAN            RADIOLOGY & ADDITIONAL STUDIES:    INTERPRETATION OF TELEMETRY (personally reviewed):    70y Male with past medical history significant for CAD/MI remote PCI to LAD/RCA lasted cardiac cath Prior stent in LAD is patent with 100% occluded RCA in which RCA was treated with thrombectomy/PTCA/STENT (STEPHAN-Resolute) good results, HTN, HLD, Tobacco Abuse who presents [facility] ER c/o left sided Chest Pain which began acutely the morning at rest and was relieved at that time by ASA 325mg PO but returned later in the day; NSTEMI, Severe 3V CAD, PCI, Smoker, HTN, HLD -- Cath showed severe multivessel CAD with LAD in-stent re-stenosis and he is now s/p 3V CABG (SVG-Ramus, SVG-RPDA, LIMA-LAD) on 2/12/21.    Plan  1.  Continue DAPT therapy   2.  Continue current CV medications   3.  Supportive care and chest tube management CT ICU    Will follow closely with you.

## 2021-02-16 NOTE — PROGRESS NOTE ADULT - SUBJECTIVE AND OBJECTIVE BOX
Brief summary:  71 y/o M with PMHX CAD/MI s/p PCI x9, HTN, HLD, Hx Sinus Pauses, Tobacco Abuse presents to [facility] ER +NSTEMI, c/o L sided Chest Pain which began 2/10 at rest and was relieved at that time by ASA 325mg PO but returned later in the day. CP radiates from L hand and across chest. Similar in description to CP 2 years ago for which he had stents placed. Pt is an Active Smoker but quit smoking 3 days ago. Has not seen Cardio for past 2 years.  +Hx prior PCI to RCA, LAD, and Diag with repeat RCA occlusion in 2018 s/p 2 more PCI STEPHAN (total 9 stents per patient.  Pt is now POD# 5  s/p C3 L w/ Dr. Izquierdo on 2/11     SUBJECTIVE:   Pt in bed talking on cell phone, pt states, " You know what I feel really good, I have no pain to complain about"  Patient denies acute pain with radiating or aggravating factors.  He denies chest pain, shortness of breath, palpitations, headache, dizziness, nausea, or vomiting.      PAST MEDICAL & SURGICAL HISTORY:  CAD in native artery    Sinus pause    HLD (hyperlipidemia)    HTN (hypertension)    Stented coronary artery  x9        MEDICATIONS  aspirin enteric coated 81 milliGRAM(s) Oral daily  atorvastatin 80 milliGRAM(s) Oral at bedtime  clopidogrel Tablet 75 milliGRAM(s) Oral daily  enoxaparin Injectable 40 milliGRAM(s) SubCutaneous daily  gabapentin 300 milliGRAM(s) Oral three times a day  insulin lispro (ADMELOG) corrective regimen sliding scale   SubCutaneous Before meals and at bedtime  lidocaine   Patch 1 Patch Transdermal daily  metoprolol tartrate 50 milliGRAM(s) Oral every 12 hours  oxyCODONE    IR 10 milliGRAM(s) Oral every 6 hours PRN  oxyCODONE    IR 5 milliGRAM(s) Oral every 6 hours PRN  pantoprazole    Tablet 40 milliGRAM(s) Oral daily  polyethylene glycol 3350 17 Gram(s) Oral two times a day  senna 2 Tablet(s) Oral at bedtime  sodium chloride 0.9% lock flush 3 milliLiter(s) IV Push every 8 hours  MEDICATIONS  (PRN):  oxyCODONE    IR 10 milliGRAM(s) Oral every 6 hours PRN Severe Pain (7 - 10)  oxyCODONE    IR 5 milliGRAM(s) Oral every 6 hours PRN Moderate Pain (4 - 6)      Daily                            11.2   13.05 )-----------( 130      ( 15 Feb 2021 06:29 )             35.0   02-15    139  |  103  |  21.0<H>  ----------------------------<  97  4.0   |  24.0  |  0.70    Ca    8.6      15 Feb 2021 06:27  Mg     2.4     02-15          Objective:  T(C): 36.8 (02-15-21 @ 20:18), Max: 37 (02-15-21 @ 04:00)  HR: 78 (02-15-21 @ 20:18) (78 - 98)  BP: 132/71 (02-15-21 @ 20:18) (122/74 - 138/80)  RR: 18 (02-15-21 @ 20:18) (17 - 18)  SpO2: 99% (02-15-21 @ 20:18) (95% - 99%)  Wt(kg): --CAPILLARY BLOOD GLUCOSE      POCT Blood Glucose.: 125 mg/dL (15 Feb 2021 20:57)  POCT Blood Glucose.: 121 mg/dL (15 Feb 2021 16:32)  POCT Blood Glucose.: 118 mg/dL (15 Feb 2021 12:16)  POCT Blood Glucose.: 109 mg/dL (15 Feb 2021 08:15)  I&O's Summary    14 Feb 2021 07:01  -  15 Feb 2021 07:00  --------------------------------------------------------  IN: 840 mL / OUT: 1550 mL / NET: -710 mL    15 Feb 2021 07:01  -  16 Feb 2021 01:51  --------------------------------------------------------  IN: 720 mL / OUT: 1500 mL / NET: -780 mL      Physical Exam  Neuro: A+O x 3, non-focal, speech clear and intact  Pulm: CTA, equal bilaterally  CV: RRR, +S1S2  Abd: soft, NT, ND, +BS  Ext: +DP Pulses b/l, +PT pulses, b/l LE edma  Inc: MSI C/D/I/stable w/ dressing Mepilex AG, Left EVH mepliex AG C/D/I with Ace wrap  Chest tubes: Pericardial tisha C/D/I    Imaging:  CXR:      ECG:               Brief summary:  69 y/o M with PMHX CAD/MI s/p PCI x9, HTN, HLD, Hx Sinus Pauses, Tobacco Abuse presents to [facility] ER +NSTEMI, c/o L sided Chest Pain which began 2/10 at rest and was relieved at that time by ASA 325mg PO but returned later in the day. CP radiates from L hand and across chest. Similar in description to CP 2 years ago for which he had stents placed. Pt is an Active Smoker but quit smoking 3 days ago. Has not seen Cardio for past 2 years.  +Hx prior PCI to RCA, LAD, and Diag with repeat RCA occlusion in 2018 s/p 2 more PCI STEPHAN (total 9 stents per patient.  Pt is now POD# 5  s/p C3 L w/ Dr. Izquierdo on 2/11     SUBJECTIVE:   Pt in bed talking on cell phone, pt states, " You know what I feel really good, I have no pain to complain about"  Patient denies acute pain with radiating or aggravating factors.  He denies chest pain, shortness of breath, palpitations, headache, dizziness, nausea, or vomiting.      PAST MEDICAL & SURGICAL HISTORY:  CAD in native artery    Sinus pause    HLD (hyperlipidemia)    HTN (hypertension)    Stented coronary artery  x9        MEDICATIONS  aspirin enteric coated 81 milliGRAM(s) Oral daily  atorvastatin 80 milliGRAM(s) Oral at bedtime  clopidogrel Tablet 75 milliGRAM(s) Oral daily  enoxaparin Injectable 40 milliGRAM(s) SubCutaneous daily  gabapentin 300 milliGRAM(s) Oral three times a day  insulin lispro (ADMELOG) corrective regimen sliding scale   SubCutaneous Before meals and at bedtime  lidocaine   Patch 1 Patch Transdermal daily  metoprolol tartrate 50 milliGRAM(s) Oral every 12 hours  oxyCODONE    IR 10 milliGRAM(s) Oral every 6 hours PRN  oxyCODONE    IR 5 milliGRAM(s) Oral every 6 hours PRN  pantoprazole    Tablet 40 milliGRAM(s) Oral daily  polyethylene glycol 3350 17 Gram(s) Oral two times a day  senna 2 Tablet(s) Oral at bedtime  sodium chloride 0.9% lock flush 3 milliLiter(s) IV Push every 8 hours  MEDICATIONS  (PRN):  oxyCODONE    IR 10 milliGRAM(s) Oral every 6 hours PRN Severe Pain (7 - 10)  oxyCODONE    IR 5 milliGRAM(s) Oral every 6 hours PRN Moderate Pain (4 - 6)      Daily                            11.2   13.05 )-----------( 130      ( 15 Feb 2021 06:29 )             35.0   02-15    139  |  103  |  21.0<H>  ----------------------------<  97  4.0   |  24.0  |  0.70    Ca    8.6      15 Feb 2021 06:27  Mg     2.4     02-15          Objective:  T(C): 36.8 (02-15-21 @ 20:18), Max: 37 (02-15-21 @ 04:00)  HR: 78 (02-15-21 @ 20:18) (78 - 98)  BP: 132/71 (02-15-21 @ 20:18) (122/74 - 138/80)  RR: 18 (02-15-21 @ 20:18) (17 - 18)  SpO2: 99% (02-15-21 @ 20:18) (95% - 99%)  Wt(kg): --CAPILLARY BLOOD GLUCOSE      POCT Blood Glucose.: 125 mg/dL (15 Feb 2021 20:57)  POCT Blood Glucose.: 121 mg/dL (15 Feb 2021 16:32)  POCT Blood Glucose.: 118 mg/dL (15 Feb 2021 12:16)  POCT Blood Glucose.: 109 mg/dL (15 Feb 2021 08:15)  I&O's Summary    14 Feb 2021 07:01  -  15 Feb 2021 07:00  --------------------------------------------------------  IN: 840 mL / OUT: 1550 mL / NET: -710 mL    15 Feb 2021 07:01  -  16 Feb 2021 01:51  --------------------------------------------------------  IN: 720 mL / OUT: 1500 mL / NET: -780 mL      Physical Exam  Neuro: A+O x 3, non-focal, speech clear and intact  Pulm: CTA, equal bilaterally, no accessory muscles, wheezing, or rhonchi noted   CV: RRR, +S1S2  Abd: soft, NT, ND, +BS  Ext: +DP Pulses b/l, +PT pulses, b/l LE  +2 edema  Inc: MSI C/D/I/stable w/ dressing Mepilex AG, Left EVH mepliex AG C/D/I with Ace wrap  Chest tubes: Pericardial tisha C/D/I    Imaging:  CXR:  < from: Xray Chest 1 View- PORTABLE-Routine (02.15.21 @ 05:42) >  COMPARISON STUDY: 2/14/2021    Frontal expiratory view of the chest shows the heart to be similar in size. Sternal wires and mediastinal clips are again noted.    The lungs show clearing of the bases and there is no evidence of pneumothorax nor pleural effusion.    IMPRESSION:  Interval basilar clearing.    < end of copied text >      ECG:    < from: 12 Lead ECG (02.13.21 @ 15:30) >  Ventricular Rate 93 BPM    Atrial Rate 93 BPM    P-R Interval 162 ms    QRS Duration 134 ms    Q-T Interval 374 ms    QTC Calculation(Bazett) 465 ms    P Axis 48 degrees    R Axis 6 degrees    T Axis -11 degrees    < end of copied text >

## 2021-02-16 NOTE — CHART NOTE - NSCHARTNOTEFT_GEN_A_CORE
70 year old Male with a medical history of CAD s/p prior PCI to RCA, LAD, and diag, presented with symptomatic chest pain, +NSTEMI s/p cardiac cath 2/10/21 indicating MVD. Now s/p C3L with Dr. Izquierdo 2/12/21.       Plan:  Pericardial tisha removed. Pacing wires cut.  Lasix 40mg IV x 1 and daily torsemide to start tomorrow for post op volume overload.  Physical therapy.  Continue ASA, Plavix, lopressor, Lipitor.  Plan likely for discharge tomorrow.  Discussed with Dr. Izquierdo in AM rounds.

## 2021-02-17 ENCOUNTER — TRANSCRIPTION ENCOUNTER (OUTPATIENT)
Age: 71
End: 2021-02-17

## 2021-02-17 VITALS
HEART RATE: 82 BPM | SYSTOLIC BLOOD PRESSURE: 102 MMHG | TEMPERATURE: 99 F | OXYGEN SATURATION: 95 % | DIASTOLIC BLOOD PRESSURE: 67 MMHG | RESPIRATION RATE: 18 BRPM

## 2021-02-17 LAB
ALBUMIN SERPL ELPH-MCNC: 2.9 G/DL — LOW (ref 3.3–5.2)
ALP SERPL-CCNC: 122 U/L — HIGH (ref 40–120)
ALT FLD-CCNC: 38 U/L — SIGNIFICANT CHANGE UP
ANION GAP SERPL CALC-SCNC: 9 MMOL/L — SIGNIFICANT CHANGE UP (ref 5–17)
AST SERPL-CCNC: 26 U/L — SIGNIFICANT CHANGE UP
BILIRUB SERPL-MCNC: 0.6 MG/DL — SIGNIFICANT CHANGE UP (ref 0.4–2)
BUN SERPL-MCNC: 21 MG/DL — HIGH (ref 8–20)
CALCIUM SERPL-MCNC: 8.7 MG/DL — SIGNIFICANT CHANGE UP (ref 8.6–10.2)
CHLORIDE SERPL-SCNC: 102 MMOL/L — SIGNIFICANT CHANGE UP (ref 98–107)
CO2 SERPL-SCNC: 26 MMOL/L — SIGNIFICANT CHANGE UP (ref 22–29)
CREAT SERPL-MCNC: 0.71 MG/DL — SIGNIFICANT CHANGE UP (ref 0.5–1.3)
GLUCOSE BLDC GLUCOMTR-MCNC: 106 MG/DL — HIGH (ref 70–99)
GLUCOSE BLDC GLUCOMTR-MCNC: 99 MG/DL — SIGNIFICANT CHANGE UP (ref 70–99)
GLUCOSE SERPL-MCNC: 95 MG/DL — SIGNIFICANT CHANGE UP (ref 70–99)
HCT VFR BLD CALC: 34.8 % — LOW (ref 39–50)
HGB BLD-MCNC: 11.1 G/DL — LOW (ref 13–17)
MAGNESIUM SERPL-MCNC: 2 MG/DL — SIGNIFICANT CHANGE UP (ref 1.6–2.6)
MCHC RBC-ENTMCNC: 28.7 PG — SIGNIFICANT CHANGE UP (ref 27–34)
MCHC RBC-ENTMCNC: 31.9 GM/DL — LOW (ref 32–36)
MCV RBC AUTO: 89.9 FL — SIGNIFICANT CHANGE UP (ref 80–100)
PLATELET # BLD AUTO: 220 K/UL — SIGNIFICANT CHANGE UP (ref 150–400)
POTASSIUM SERPL-MCNC: 4.1 MMOL/L — SIGNIFICANT CHANGE UP (ref 3.5–5.3)
POTASSIUM SERPL-SCNC: 4.1 MMOL/L — SIGNIFICANT CHANGE UP (ref 3.5–5.3)
PROT SERPL-MCNC: 5.9 G/DL — LOW (ref 6.6–8.7)
RBC # BLD: 3.87 M/UL — LOW (ref 4.2–5.8)
RBC # FLD: 13.8 % — SIGNIFICANT CHANGE UP (ref 10.3–14.5)
SODIUM SERPL-SCNC: 137 MMOL/L — SIGNIFICANT CHANGE UP (ref 135–145)
WBC # BLD: 10.61 K/UL — HIGH (ref 3.8–10.5)
WBC # FLD AUTO: 10.61 K/UL — HIGH (ref 3.8–10.5)

## 2021-02-17 PROCEDURE — 93458 L HRT ARTERY/VENTRICLE ANGIO: CPT

## 2021-02-17 PROCEDURE — 93312 ECHO TRANSESOPHAGEAL: CPT

## 2021-02-17 PROCEDURE — C1889: CPT

## 2021-02-17 PROCEDURE — 85014 HEMATOCRIT: CPT

## 2021-02-17 PROCEDURE — 99152 MOD SED SAME PHYS/QHP 5/>YRS: CPT

## 2021-02-17 PROCEDURE — 85730 THROMBOPLASTIN TIME PARTIAL: CPT

## 2021-02-17 PROCEDURE — 85610 PROTHROMBIN TIME: CPT

## 2021-02-17 PROCEDURE — 86900 BLOOD TYPING SEROLOGIC ABO: CPT

## 2021-02-17 PROCEDURE — 85576 BLOOD PLATELET AGGREGATION: CPT

## 2021-02-17 PROCEDURE — 94010 BREATHING CAPACITY TEST: CPT

## 2021-02-17 PROCEDURE — C1713: CPT

## 2021-02-17 PROCEDURE — 85025 COMPLETE CBC W/AUTO DIFF WBC: CPT

## 2021-02-17 PROCEDURE — 80061 LIPID PANEL: CPT

## 2021-02-17 PROCEDURE — 82553 CREATINE MB FRACTION: CPT

## 2021-02-17 PROCEDURE — 82803 BLOOD GASES ANY COMBINATION: CPT

## 2021-02-17 PROCEDURE — 83880 ASSAY OF NATRIURETIC PEPTIDE: CPT

## 2021-02-17 PROCEDURE — 86850 RBC ANTIBODY SCREEN: CPT

## 2021-02-17 PROCEDURE — 86901 BLOOD TYPING SEROLOGIC RH(D): CPT

## 2021-02-17 PROCEDURE — P9045: CPT

## 2021-02-17 PROCEDURE — 84134 ASSAY OF PREALBUMIN: CPT

## 2021-02-17 PROCEDURE — 82435 ASSAY OF BLOOD CHLORIDE: CPT

## 2021-02-17 PROCEDURE — 86803 HEPATITIS C AB TEST: CPT

## 2021-02-17 PROCEDURE — 82330 ASSAY OF CALCIUM: CPT

## 2021-02-17 PROCEDURE — 94760 N-INVAS EAR/PLS OXIMETRY 1: CPT

## 2021-02-17 PROCEDURE — 93320 DOPPLER ECHO COMPLETE: CPT

## 2021-02-17 PROCEDURE — 80053 COMPREHEN METABOLIC PANEL: CPT

## 2021-02-17 PROCEDURE — 97116 GAIT TRAINING THERAPY: CPT

## 2021-02-17 PROCEDURE — 80076 HEPATIC FUNCTION PANEL: CPT

## 2021-02-17 PROCEDURE — 84445 ASSAY OF TSI GLOBULIN: CPT

## 2021-02-17 PROCEDURE — 99153 MOD SED SAME PHYS/QHP EA: CPT

## 2021-02-17 PROCEDURE — 94002 VENT MGMT INPAT INIT DAY: CPT

## 2021-02-17 PROCEDURE — 86769 SARS-COV-2 COVID-19 ANTIBODY: CPT

## 2021-02-17 PROCEDURE — C8929: CPT

## 2021-02-17 PROCEDURE — 83690 ASSAY OF LIPASE: CPT

## 2021-02-17 PROCEDURE — 83735 ASSAY OF MAGNESIUM: CPT

## 2021-02-17 PROCEDURE — 83036 HEMOGLOBIN GLYCOSYLATED A1C: CPT

## 2021-02-17 PROCEDURE — 36600 WITHDRAWAL OF ARTERIAL BLOOD: CPT

## 2021-02-17 PROCEDURE — 97530 THERAPEUTIC ACTIVITIES: CPT

## 2021-02-17 PROCEDURE — 71045 X-RAY EXAM CHEST 1 VIEW: CPT | Mod: 26

## 2021-02-17 PROCEDURE — 85018 HEMOGLOBIN: CPT

## 2021-02-17 PROCEDURE — C1769: CPT

## 2021-02-17 PROCEDURE — 0225U NFCT DS DNA&RNA 21 SARSCOV2: CPT

## 2021-02-17 PROCEDURE — 82550 ASSAY OF CK (CPK): CPT

## 2021-02-17 PROCEDURE — 93005 ELECTROCARDIOGRAM TRACING: CPT

## 2021-02-17 PROCEDURE — 99024 POSTOP FOLLOW-UP VISIT: CPT

## 2021-02-17 PROCEDURE — 82962 GLUCOSE BLOOD TEST: CPT

## 2021-02-17 PROCEDURE — 84439 ASSAY OF FREE THYROXINE: CPT

## 2021-02-17 PROCEDURE — 93325 DOPPLER ECHO COLOR FLOW MAPG: CPT

## 2021-02-17 PROCEDURE — 93880 EXTRACRANIAL BILAT STUDY: CPT

## 2021-02-17 PROCEDURE — C1887: CPT

## 2021-02-17 PROCEDURE — 87641 MR-STAPH DNA AMP PROBE: CPT

## 2021-02-17 PROCEDURE — C1751: CPT

## 2021-02-17 PROCEDURE — 85027 COMPLETE CBC AUTOMATED: CPT

## 2021-02-17 PROCEDURE — 84481 FREE ASSAY (FT-3): CPT

## 2021-02-17 PROCEDURE — 93010 ELECTROCARDIOGRAM REPORT: CPT

## 2021-02-17 PROCEDURE — 84443 ASSAY THYROID STIM HORMONE: CPT

## 2021-02-17 PROCEDURE — 80048 BASIC METABOLIC PNL TOTAL CA: CPT

## 2021-02-17 PROCEDURE — 84295 ASSAY OF SERUM SODIUM: CPT

## 2021-02-17 PROCEDURE — 87640 STAPH A DNA AMP PROBE: CPT

## 2021-02-17 PROCEDURE — 94660 CPAP INITIATION&MGMT: CPT

## 2021-02-17 PROCEDURE — C1894: CPT

## 2021-02-17 PROCEDURE — 86923 COMPATIBILITY TEST ELECTRIC: CPT

## 2021-02-17 PROCEDURE — 83605 ASSAY OF LACTIC ACID: CPT

## 2021-02-17 PROCEDURE — 84132 ASSAY OF SERUM POTASSIUM: CPT

## 2021-02-17 PROCEDURE — 84100 ASSAY OF PHOSPHORUS: CPT

## 2021-02-17 PROCEDURE — 82947 ASSAY GLUCOSE BLOOD QUANT: CPT

## 2021-02-17 PROCEDURE — 84484 ASSAY OF TROPONIN QUANT: CPT

## 2021-02-17 PROCEDURE — 36415 COLL VENOUS BLD VENIPUNCTURE: CPT

## 2021-02-17 PROCEDURE — 97163 PT EVAL HIGH COMPLEX 45 MIN: CPT

## 2021-02-17 PROCEDURE — 99285 EMERGENCY DEPT VISIT HI MDM: CPT | Mod: 25

## 2021-02-17 PROCEDURE — 71045 X-RAY EXAM CHEST 1 VIEW: CPT

## 2021-02-17 RX ORDER — ATORVASTATIN CALCIUM 80 MG/1
1 TABLET, FILM COATED ORAL
Qty: 30 | Refills: 1
Start: 2021-02-17 | End: 2021-04-17

## 2021-02-17 RX ORDER — CLOPIDOGREL BISULFATE 75 MG/1
1 TABLET, FILM COATED ORAL
Qty: 30 | Refills: 1
Start: 2021-02-17 | End: 2021-04-17

## 2021-02-17 RX ORDER — SENNA PLUS 8.6 MG/1
2 TABLET ORAL
Qty: 14 | Refills: 0
Start: 2021-02-17 | End: 2021-02-23

## 2021-02-17 RX ORDER — POTASSIUM CHLORIDE 20 MEQ
1 PACKET (EA) ORAL
Qty: 5 | Refills: 0
Start: 2021-02-17 | End: 2021-02-21

## 2021-02-17 RX ORDER — METOPROLOL TARTRATE 50 MG
1 TABLET ORAL
Qty: 60 | Refills: 1
Start: 2021-02-17 | End: 2021-04-17

## 2021-02-17 RX ORDER — ASPIRIN/CALCIUM CARB/MAGNESIUM 324 MG
1 TABLET ORAL
Qty: 30 | Refills: 1
Start: 2021-02-17 | End: 2021-04-17

## 2021-02-17 RX ADMIN — GABAPENTIN 300 MILLIGRAM(S): 400 CAPSULE ORAL at 06:01

## 2021-02-17 RX ADMIN — SODIUM CHLORIDE 3 MILLILITER(S): 9 INJECTION INTRAMUSCULAR; INTRAVENOUS; SUBCUTANEOUS at 09:40

## 2021-02-17 RX ADMIN — OXYCODONE HYDROCHLORIDE 10 MILLIGRAM(S): 5 TABLET ORAL at 11:24

## 2021-02-17 RX ADMIN — Medication 81 MILLIGRAM(S): at 11:25

## 2021-02-17 RX ADMIN — OXYCODONE HYDROCHLORIDE 5 MILLIGRAM(S): 5 TABLET ORAL at 06:01

## 2021-02-17 RX ADMIN — CLOPIDOGREL BISULFATE 75 MILLIGRAM(S): 75 TABLET, FILM COATED ORAL at 11:25

## 2021-02-17 RX ADMIN — SODIUM CHLORIDE 3 MILLILITER(S): 9 INJECTION INTRAMUSCULAR; INTRAVENOUS; SUBCUTANEOUS at 06:02

## 2021-02-17 RX ADMIN — Medication 20 MILLIGRAM(S): at 06:01

## 2021-02-17 RX ADMIN — GABAPENTIN 300 MILLIGRAM(S): 400 CAPSULE ORAL at 11:24

## 2021-02-17 RX ADMIN — OXYCODONE HYDROCHLORIDE 10 MILLIGRAM(S): 5 TABLET ORAL at 16:01

## 2021-02-17 RX ADMIN — PANTOPRAZOLE SODIUM 40 MILLIGRAM(S): 20 TABLET, DELAYED RELEASE ORAL at 11:25

## 2021-02-17 RX ADMIN — Medication 50 MILLIGRAM(S): at 06:01

## 2021-02-17 NOTE — CHART NOTE - NSCHARTNOTEFT_GEN_A_CORE
70 year old Male with a medical history of CAD s/p prior PCI to RCA, LAD, and diag, presented with symptomatic chest pain, +NSTEMI s/p cardiac cath 2/10/21 indicating MVD. Now s/p C3L with Dr. Izquierdo 2/12/21.       Plan:  DC MSI mepilex and LLE mepilex today.  Shower today and daily thereafter.  Torsemide x 5 days with post op volume overload.  Continue ASA, Plavix, lopressor, Lipitor.  Plan for discharge home today.  Discussed with Dr. Izquierdo in AM rounds.

## 2021-02-17 NOTE — PROGRESS NOTE ADULT - REASON FOR ADMISSION
Chest Pain

## 2021-02-17 NOTE — PROGRESS NOTE ADULT - SUBJECTIVE AND OBJECTIVE BOX
Subjective: Patient lying in bed, no acute distress noted, denies chest pian, pressure, palpitation, dizziness, abdominal pain, N/V/D.      VITAL SIGNS  Vital Signs Last 24 Hrs  T(C): 36.8 (21 @ 20:22), Max: 37.4 (21 @ 15:33)  T(F): 98.3 (21 @ 20:22), Max: 99.4 (21 @ 15:33)  HR: 80 (21 @ 20:22) (75 - 95)  BP: 130/83 (21 @ 20:22) (113/72 - 140/85)  RR: 18 (21 @ 20:22) (17 - 18)  SpO2: 95% (21 @ 20:22) (95% - 98%)  on room air          Telemetry/Alarms: First degree AV block  LVEF: 55%    MEDICATIONS  aspirin enteric coated 81 milliGRAM(s) Oral daily  atorvastatin 80 milliGRAM(s) Oral at bedtime  clopidogrel Tablet 75 milliGRAM(s) Oral daily  enoxaparin Injectable 40 milliGRAM(s) SubCutaneous daily  gabapentin 300 milliGRAM(s) Oral three times a day  insulin lispro (ADMELOG) corrective regimen sliding scale   SubCutaneous Before meals and at bedtime  lidocaine   Patch 1 Patch Transdermal daily  metoprolol tartrate 50 milliGRAM(s) Oral every 12 hours  oxyCODONE    IR 10 milliGRAM(s) Oral every 6 hours PRN  oxyCODONE    IR 5 milliGRAM(s) Oral every 6 hours PRN  pantoprazole    Tablet 40 milliGRAM(s) Oral daily  polyethylene glycol 3350 17 Gram(s) Oral two times a day  senna 2 Tablet(s) Oral at bedtime  sodium chloride 0.9% lock flush 3 milliLiter(s) IV Push every 8 hours  torsemide 20 milliGRAM(s) Oral daily      PHYSICAL EXAM  General: well nourished, well developed, no acute distress  Neurology: alert and oriented x 3, nonfocal, no gross deficits  Respiratory: clear to auscultation bilaterally  CV: regular rate and rhythm, normal S1, S2, no murmur, gallops or rubs  Abdomen: soft, nontender, nondistended, positive bowel sounds, last bowel movement   Extremities: warm, well perfused. +3 BLE edema. + DP pulses bilaterally  Incisions: midline sternal incision, + mepilex, c/d/i. sternum stable, Left EVH mepliex AG C/D/I with Ace wrap  Psych: Appropriate mood and affect    02-15 @ 07:01  -   @ 07:00  --------------------------------------------------------  IN: 720 mL / OUT: 1820 mL / NET: -1100 mL     @ 07:01  -   @ 02:47  --------------------------------------------------------  IN: 240 mL / OUT: 1100 mL / NET: -860 mL        Weights:  Daily     Daily Weight in k.2 (2021 06:00)  Admit Wt: Drug Dosing Weight  Height (cm): 182.9 (2021 06:45)  Weight (kg): 108.9 (2021 06:45)  BMI (kg/m2): 32.6 (2021 06:45)  BSA (m2): 2.3 (2021 06:45)    All laboratory results, radiology and medications reviewed.    LABS      139  |  104  |  23.0<H>  ----------------------------<  107<H>  4.0   |  26.0  |  0.69    Ca    8.5<L>      2021 06:00  Phos  2.6       Mg     2.2         TPro  5.7<L>  /  Alb  2.9<L>  /  TBili  0.4  /  DBili  x   /  AST  35  /  ALT  35  /  AlkPhos  117                                   10.7   11.19 )-----------( 153      ( 2021 06:00 )             33.1            Bilirubin Total, Serum: 0.4 mg/dL ( @ 06:00)    CAPILLARY BLOOD GLUCOSE      POCT Blood Glucose.: 101 mg/dL (2021 20:46)  POCT Blood Glucose.: 122 mg/dL (2021 16:16)  POCT Blood Glucose.: 104 mg/dL (2021 11:21)  POCT Blood Glucose.: 110 mg/dL (2021 07:57)         < from: Xray Chest 1 View- PORTABLE-Urgent (Xray Chest 1 View- PORTABLE-Urgent .) (21 @ 12:44) >  INTERPRETATION:  AP chest on 2021 at 11:38 AM. Patient has abnormal chest sounds.    Heart is enlarged. Sternotomy again noted.    Bandlike atelectasis in the left lower lung field again noted.    No pneumothorax.    Chest is similar to earlier in the day.    IMPRESSION: No change from earlier in the day.    < end of copied text >  < from: 12 Lead ECG (21 @ 07:21) >    Ventricular Rate 71 BPM    Atrial Rate 71 BPM    P-R Interval 170 ms    QRS Duration 148 ms    Q-T Interval 438 ms    QTC Calculation(Bazett) 475 ms    P Axis 9 degrees    R Axis 26 degrees    T Axis -18 degrees    Diagnosis Line Normal sinus rhythm  Right bundle branch block  Inferior infarct , age undetermined    < end of copied text >  < from: TTE Echo Complete w/ Contrast w/ Doppler (02.10.21 @ 19:57) >    Summary:   1. Technically difficult study.   2. Endocardial visualization was enhanced with intravenous echo contrast.   3. Left ventricular ejection fraction, by visual estimation, is 55 to 60%.   4. Normal global left ventricular systolic function.   5. Basal and mid inferior wall is abnormal as describedabove.   6. Spectral Doppler shows impaired relaxation pattern of left ventricular myocardial filling (Grade I diastolic dysfunction).   7. Normal left ventricular internal cavity size.   8. Normal right ventricular size and function.   9. Normal left atrial size.  10. Normal right atrial size.  11. No evidence of mitral valve regurgitation.  12. Normal trileaflet aortic valve with normal opening.  13. There is no evidence of pericardial effusion.    < end of copied text >    PAST MEDICAL & SURGICAL HISTORY:  CAD in native artery    Sinus pause    HLD (hyperlipidemia)    HTN (hypertension)    Stented coronary artery  x9

## 2021-02-17 NOTE — DISCHARGE NOTE PROVIDER - NSDCFUADDINST_GEN_ALL_CORE_FT
Please call the Cardiothoracic Surgery office at [phone] if you are experiencing any shortness of breath, chest pain, fevers or chills, drainage from the incisions, persistent nausea, vomiting or if you have any questions about your medications. If the symptoms are severe, call 911 and go to the nearest hospital. You can also call [phone] for an emergency [facility] ambulance, which will take you to the closest [facility].    If you need any assistance for making any appointments for a new consult or referral in any specialty, please call our [facility] Clinical Coordination Center at [phone].  Your Care Navigator Nurse Practitioner will be in touch to see you in your home within a few days from discharge. The Follow Your [facility] program can help ensure you understand your medications, discharge instructions and answer any questions you may have at that time. They are also a great source to address concerns during the day and may be reached at [phone].

## 2021-02-17 NOTE — PROGRESS NOTE ADULT - PROBLEM SELECTOR PLAN 3
Continue statin
Continue statin
Continue statin as tolerated in preop setting.
Continue statin

## 2021-02-17 NOTE — DISCHARGE NOTE PROVIDER - REASON FOR ADMISSION
CAD now s/p three Vessel CABG (SVG-Ramus, SVG-RPDA, LIMA-LAD) on 2/12/2021 with Dr. Izquierdo  Chest Pain

## 2021-02-17 NOTE — PROGRESS NOTE ADULT - PROBLEM SELECTOR PLAN 2
Continue Lopressor 50 mg BID  Torsemide 20 mg daily  DASH diet
Continue Lopressor 25 mg BID
Continue Lopressor 50 mg BID
Continue beta-blocker as tolerated in preop setting.
Resume Lopressor 25 mg BID
Continue Lopressor 50 mg BID

## 2021-02-17 NOTE — DISCHARGE NOTE NURSING/CASE MANAGEMENT/SOCIAL WORK - PATIENT PORTAL LINK FT
You can access the FollowMyHealth Patient Portal offered by [facility] by registering at the following website: [email or web address]. By joining [facility]’s FollowMyHealth portal, you will also be able to view your health information using other applications (apps) compatible with our system.

## 2021-02-17 NOTE — PROGRESS NOTE ADULT - PROBLEM SELECTOR PLAN 4
Low TSH noted in pre-op blood work.   Patient clinically asymptomatic for hyperthyroidism.   Endocrine consulted and evaluated (Dr. Tiwari)  No medical changes as this time, will need f/u in outpatient setting
Low TSH noted in pre-op bloodwork.   Patient clinically asymptomatic for hyperthyroidism.   Endocrine consulted and evaluated (Dr. Tiwari) noting:   No medical changes as this time, will need f/u in outpatient setting
Low TSH noted in pre-op bloodwork.   Patient clinically asymptomatic for hyperthyroidism.   Endocrine consulted and evaluated (Dr. Tiwari) noting:   No medical changes as this time, will need f/u in outpatient setting
Low TSH noted in preop bloodwork.   Patient clinically asymptomatic for hyperthyroidism.   Endocrine consult Dr. Tiwari consulted.  No medical changes as this time, will need f/u in outpatient setting
Low TSH noted in preop bloodwork.   Patient clinically asymptomatic for hyperthyroidism.   Endocrine consult Dr. Tiwari contacted via telephone. TSH level should not affect surgery, but waiting on T3 and T4 level for further evaluation of possible thyroid condition.
Low TSH noted in preop bloodwork.   Patient clinically asymptomatic for hyperthyroidism.   Endocrine consult Dr. Tiwari consulted.  No medical changes as this time, will need f/u in outpatient setting

## 2021-02-17 NOTE — DISCHARGE NOTE PROVIDER - CARE PROVIDER_API CALL
Jamie Izquierdo)  Surgery; Thoracic and Cardiac Surgery  [address] [place] [address]  Phone: [phone]  Fax: [phone]  Follow Up Time:     Sixto Bliss)  Cardiology; Internal Medicine; Interventional Cardiology  [address] [place] [address]  Phone: [phone]  Fax: [phone]  Follow Up Time:     Coty Tiwari)  Internal Medicine  [facility] [place] [address]  Phone: [phone]  Fax: [phone]  Follow Up Time:    Sixto Bliss)  Cardiology; Internal Medicine; Interventional Cardiology  [address] [place] [address]  Phone: [phone]  Fax: [phone]  Follow Up Time:     Coty Tiwari)  Internal Medicine  [address] [place] [address]  Phone: [phone]  Fax: [phone]  Follow Up Time:     arturo cardenas  [facility]. [facility] [address]  [facility] [address]  Phone: [phone]  Fax: (   )    -  Follow Up Time:

## 2021-02-17 NOTE — PROGRESS NOTE ADULT - PROBLEM SELECTOR PLAN 5
Hep gtt and SCDs for DVT prophylaxis.  Protonix for GI prophylaxis  Preop Abx taped to chart for OR.     Plan to be discussed further with CT Surgery attending / team in AM rounds.
Pantoprazole for GI prophylaxis  SCDs, Lovenox for DVT prophylaxis    Plan to be discussed Dr. Izquierdo/ CTS team in AM rounds
Pantoprazole for GI prophylaxis  SCDs, Lovenox for DVT prophylaxis    Plan to be discussed / reviewed with CTICU team during AM rounds.
Pantoprazole for GI prophylaxis  SCDs, Lovenox for DVT prophylaxis    Plan to be discussed Dr. Izquierdo/ CTS team in AM rounds
Pantoprazole for GI prophylaxis  SCDs, Lovenox for DVT prophylaxis
Pantoprazole for GI prophylaxis  SCDs, Lovenox for DVT prophylaxis

## 2021-02-17 NOTE — PROGRESS NOTE ADULT - PROBLEM SELECTOR PROBLEM 1
CAD in native artery

## 2021-02-17 NOTE — DISCHARGE NOTE PROVIDER - NSDCCPTREATMENT_GEN_ALL_CORE_FT
PRINCIPAL PROCEDURE  Procedure: CABG, using endoscopic vessel harvesting  Findings and Treatment: Three Vessel CABG (SVG-Ramus, SVG-RPDA, LIMA-LAD)

## 2021-02-17 NOTE — DISCHARGE NOTE PROVIDER - CARE PROVIDERS DIRECT ADDRESSES
,DirectAddress_Unknown,DirectAddress_Unknown,[email or web address] ,DirectAddress_Unknown,[email or web address],DirectAddress_Unknown

## 2021-02-17 NOTE — DISCHARGE NOTE PROVIDER - NSDCCPCAREPLAN_GEN_ALL_CORE_FT
PRINCIPAL DISCHARGE DIAGNOSIS  Diagnosis: ACS (acute coronary syndrome)  Assessment and Plan of Treatment: 1. Take ALL of your medications as ordered. Fill your prescriptions the day you are discharged and take according to the schedule you were given. Continue to take a stool softener if you are taking narcotic pain medications. AVOID medications such as ibuprofen or naproxen if you have had bypass surgery. If you have any questions or are unable to fill the prescriptions, please call the office right away at [phone].  2. Shower daily. Clean all incisions daily while showering with warm water and mild soap, pat dry with a clean towel and do not cover with any dressings unless instructed to. No bathing, swimming in a pool or the ocean until instructed by MD.  DO NOT use creams or lotions on the wound.  3. We advise that you do not drive until instructed by MD.   4. You may not return to work until instructed by MD.   5. Please eat a low fat, low cholesterol, low salt diet. (No added/extra salt)  6. Weigh yourself every day in the morning and record it in the weight log in your red folder. Notify the office of any weight gain more than 2-3 pounds in 24 hours.  7. Continue breathing exercises several times a day. Continue to use your heart pillow.  8. No heavy lifting nothing greater than 5 pounds until cleared by MD.   9. Call / Notify MD any fever greater than 101.0, any drainage from incisions or if they become red, hot or very tender to the touch.  10. Increase activity as tolerated. Walk indoors and/or outdoors at least 3 times a day.        SECONDARY DISCHARGE DIAGNOSES  Diagnosis: HTN (hypertension)  Assessment and Plan of Treatment: Folow up with your cardiologist and primary care doctor  Continue to take aal medications as ordered  Choose lean meats and poultry without skin and prepare them without added saturated/trans fat. Choose fish at least twice a week, especially those high in omega-3 (salmon or trout). Select fat-free or low-fat dairy products. To lower cholesterol, reduce saturated fat to no more than 5 to 6 percent of total calories. For someone eating 2,000 calories a day, that’s about 13 grams of saturated fat.  Cut back on beverages and foods with added sugars.  Choose and prepare foods with little or no salt. To lower blood pressure, reducing daily intake of sodium to 1,500 mg is desirable because it can lower blood pressure.  If you drink alcohol, drink sparingly and NOT if you are taking narcotics for pain. Follow the American Heart Association recommendations when you eat out, and keep an eye on your portion sizes.      Diagnosis: Pure hypercholesterolemia  Assessment and Plan of Treatment: Follow up with your primary care dosctor  Continue to take all medications as ordered    Diagnosis: Low TSH level  Assessment and Plan of Treatment: Follow up with endocrinologist as outpatient

## 2021-02-17 NOTE — PROGRESS NOTE ADULT - PROVIDER SPECIALTY LIST ADULT
CT Surgery
Cardiology
Anesthesia
Cardiology
Hospitalist
CT Surgery

## 2021-02-17 NOTE — DISCHARGE NOTE PROVIDER - NSDCFUADDAPPT_GEN_ALL_CORE_FT
Please follow up with Dr. Izquierdo on  at [facility].      Please follow up with Dr. Izquierdo on   at [facility].      Please follow up with Dr. Izquierdo on 3/3/21 at 2:30pm  at [facility].      Please follow up with Dr. Izquierdo on 3/3/21 at 2:30pm  at [facility].     Please call the office when you arrive in the parking lot.  The office will then instruct you regarding when to enter the building.  [phone]

## 2021-02-17 NOTE — DISCHARGE NOTE PROVIDER - NSDCMRMEDTOKEN_GEN_ALL_CORE_FT
aspirin 81 mg oral tablet, chewable: 1 tab(s) orally once a day  atorvastatin 80 mg oral tablet: 1 tab(s) orally once a day (at bedtime)   losartan 25 mg oral tablet: 1 tab(s) orally once a day   aspirin 81 mg oral delayed release tablet: 1 tab(s) orally once a day  atorvastatin 80 mg oral tablet: 1 tab(s) orally once a day (at bedtime)  clopidogrel 75 mg oral tablet: 1 tab(s) orally once a day  metoprolol tartrate 50 mg oral tablet: 1 tab(s) orally every 12 hours  oxycodone-acetaminophen 5 mg-325 mg oral tablet: 1 tab(s) orally every 6 hours as needed for severe pain MDD:4 tabs  Potassium Chloride (Eqv-Klor-Con 10) 10 mEq oral tablet, extended release: 1 tab(s) orally once a day   WHILE TAKING TORSEMIDE ONLY  senna oral tablet: 2 tab(s) orally once a day (at bedtime) TAKE WHILE TAKING NARCOTICS ONLY  torsemide 20 mg oral tablet: 1 tab(s) orally once a day

## 2021-02-17 NOTE — DISCHARGE NOTE PROVIDER - PROVIDER TOKENS
PROVIDER:[TOKEN:[[id number]]],PROVIDER:[TOKEN:[[id number]]],PROVIDER:[TOKEN:[[id number]]] PROVIDER:[TOKEN:[[id number]]],PROVIDER:[TOKEN:[[id number]]],FREE:[LAST:[theresa],FIRST:[arturo],PHONE:[phone]],FAX:[(   )    -],ADDRESS:[[address] [place] [address]]]

## 2021-02-17 NOTE — PROGRESS NOTE ADULT - PROBLEM SELECTOR PLAN 1
s/p C3L with Dr. Izquierdo on 2/12  Continue Lopressor 50 mg BID as tolerated  ASA/Plavix for acute graft patency  Statin for anti inflammatory properties  Encourage IS hourly while awake  OOB to chair, ambulate with PT assist daily
s/p C3L with Dr. Izquierdo on 2/12  Continue Lopressor 50 mg BID as tolerated  ASA/Plavix for acute graft patency  Statin for anti inflammatory properties  Encourage IS hourly while awake  OOB to chair, ambulate with PT assist daily  Lasix 40 mg IV x1 dose given yesterday (2/16),  Continue Torsemide 20 mg daily
s/p C3L with Dr. Izquierdo on 2/12  Continue Lopressor 50 mg BID as tolerated  ASA/Plavix for acute graft patency  Statin for anti inflammatory properties  Encourage IS hourly while awake  OOB to chair, ambulate with PT assist daily  Isolated PW   F/U Moe tube
Preoperative workup completed.   TTE without significant valvular disease.   Carotid US without significant stenosis.   CXR clear 2/10.   Cath as above.   Discontinue Hep gtt at midnight for OR in AM.   Preop Abx taped to chart.   Continue ASA, BB, and statin.   Plan for CABG tomorrow 2/12/21.
s/p C3L with Dr. Izquierdo on 2/12  Continue Lopressor 25 mg BID  ASA/Plavix for acute graft patency  Statin for anti inflammatory properties  Encourage IS hourly while awake  OOB to chair, ambulate with PT assist daily
s/p C3L with Dr. Izquierdo on 2/12  No inotropic or pressor support required  Start Lopressor 25 mg BID  ASA/Plavix for acute graft patency  Statin for anti inflammatory properties  Extubated, encourage IS hourly while awake  OOB to chair, ambulate with PT assist daily

## 2021-02-17 NOTE — PROGRESS NOTE ADULT - PROBLEM SELECTOR PROBLEM 3
Pure hypercholesterolemia

## 2021-02-17 NOTE — DISCHARGE NOTE PROVIDER - HOSPITAL COURSE
67 year old M with PMH of CAD/MI s/p PCI x9, HTN, HLD,  PCI to RCA, LAD, and diag, presented with symptomatic chest pain, +NSTEMI s/p cardiac cath 2/10/21 indicating MVD. Now s/p C3L with Dr. Izquierdo 2/12/21.   Post op course was uneventful, now ready for discharge home as per DR. Izquierdo.   < from: Xray Chest 1 View- PORTABLE-Urgent (Xray Chest 1 View- PORTABLE-Urgent .) (02.16.21 @ 12:44) >    Heart is enlarged. Sternotomy again noted.    Bandlike atelectasis in the left lower lung field again noted.    No pneumothorax.    Chest is similar to earlier in the day.    IMPRESSION: No change from earlier in the day.    < end of copied text >    < from: TTE Echo Complete w/ Contrast w/ Doppler (02.10.21 @ 19:57) >    Summary:   1. Technically difficult study.   2. Endocardial visualization was enhanced with intravenous echo contrast.   3. Left ventricular ejection fraction, by visual estimation, is 55 to 60%.   4. Normal global left ventricular systolic function.   5. Basal and mid inferior wall is abnormal as describedabove.   6. Spectral Doppler shows impaired relaxation pattern of left ventricular myocardial filling (Grade I diastolic dysfunction).   7. Normal left ventricular internal cavity size.   8. Normal right ventricular size and function.   9. Normal left atrial size.  10. Normal right atrial size.  11. No evidence of mitral valve regurgitation.  12. Normal trileaflet aortic valve with normal opening.  13. There is no evidence of pericardial effusion.    < end of copied text >    < from: 12 Lead ECG (02.16.21 @ 07:21) >    Ventricular Rate 71 BPM    Atrial Rate 71 BPM    P-R Interval 170 ms    QRS Duration 148 ms    Q-T Interval 438 ms    QTC Calculation(Bazett) 475 ms    P Axis 9 degrees    R Axis 26 degrees    T Axis -18 degrees    Diagnosis Line Normal sinus rhythm  Right bundle branch block  Inferior infarct , age undetermined    < end of copied text >

## 2021-02-18 ENCOUNTER — NON-APPOINTMENT (OUTPATIENT)
Age: 71
End: 2021-02-18

## 2021-02-18 ENCOUNTER — APPOINTMENT (OUTPATIENT)
Dept: CARE COORDINATION | Facility: HOME HEALTH | Age: 71
End: 2021-02-18
Payer: SELF-PAY

## 2021-02-18 DIAGNOSIS — Z86.79 PERSONAL HISTORY OF OTHER DISEASES OF THE CIRCULATORY SYSTEM: ICD-10-CM

## 2021-02-18 DIAGNOSIS — Z86.39 PERSONAL HISTORY OF OTHER ENDOCRINE, NUTRITIONAL AND METABOLIC DISEASE: ICD-10-CM

## 2021-02-18 PROCEDURE — 99024 POSTOP FOLLOW-UP VISIT: CPT

## 2021-02-18 RX ORDER — METOPROLOL TARTRATE 50 MG/1
50 TABLET, FILM COATED ORAL
Refills: 0 | Status: ACTIVE | COMMUNITY

## 2021-02-18 RX ORDER — CLOPIDOGREL 75 MG/1
75 TABLET, FILM COATED ORAL
Refills: 0 | Status: ACTIVE | COMMUNITY

## 2021-02-18 RX ORDER — ATORVASTATIN CALCIUM 80 MG/1
80 TABLET, FILM COATED ORAL
Refills: 0 | Status: ACTIVE | COMMUNITY

## 2021-02-18 RX ORDER — SENNOSIDES 8.6 MG TABLETS 8.6 MG/1
8.6 TABLET ORAL
Refills: 0 | Status: ACTIVE | COMMUNITY

## 2021-02-18 RX ORDER — OXYCODONE HYDROCHLORIDE AND ACETAMINOPHEN 5; 325 MG/1; MG/1
5-325 TABLET ORAL
Refills: 0 | Status: ACTIVE | COMMUNITY

## 2021-02-19 NOTE — PHYSICAL EXAM
[Sclera] : the sclera and conjunctiva were normal [Neck Appearance] : the appearance of the neck was normal [Respiration, Rhythm And Depth] : normal respiratory rhythm and effort [Exaggerated Use Of Accessory Muscles For Inspiration] : no accessory muscle use [Chest Visual Inspection Thoracic Asymmetry] : no chest asymmetry [FreeTextEntry2] : B/L LE without edema, B/L calves soft, NT, ND as per pt [Skin Color & Pigmentation] : normal skin color and pigmentation [] : no rash [FreeTextEntry1] : SVG harvest site without erythema, warmth or drainage [Oriented To Time, Place, And Person] : oriented to person, place, and time [Impaired Insight] : insight and judgment were intact [Affect] : the affect was normal

## 2021-02-19 NOTE — REASON FOR VISIT
[Follow-Up: _____] : a [unfilled] follow-up visit [FreeTextEntry1] : FOLLOW YOUR HEART- Transitional Care Management Program- [facility]\par \par

## 2021-02-19 NOTE — ASSESSMENT
[FreeTextEntry1] : Pt recovering well at home s/p OHS. Reviewed all medications and dosages with pt understanding. Pt has all medications in home and is taking as prescribed. Pain controlled with current medication regimen. Pt and his cousin concerned with a "bump at the top of my incision" After review of incision encouraged pt that this is a normal post operative finding at this time. Pt advised of worsening symptoms and NP will follow with a home visit on Monday as well. No further new symptoms, issues or concerns to report at this time.\par

## 2021-02-22 ENCOUNTER — APPOINTMENT (OUTPATIENT)
Dept: CARE COORDINATION | Facility: HOME HEALTH | Age: 71
End: 2021-02-22
Payer: SELF-PAY

## 2021-02-22 VITALS
SYSTOLIC BLOOD PRESSURE: 108 MMHG | DIASTOLIC BLOOD PRESSURE: 68 MMHG | RESPIRATION RATE: 15 BRPM | OXYGEN SATURATION: 96 % | HEART RATE: 82 BPM

## 2021-02-22 PROCEDURE — 99024 POSTOP FOLLOW-UP VISIT: CPT

## 2021-02-22 NOTE — ASSESSMENT
[FreeTextEntry1] : Pt recovering well at home s/p OHS. Reviewed all medications and dosages with pt understanding. Pt has all medications in home and is taking as prescribed. Pain controlled with current medication regimen. Pt and his cousin concerned with a "bump at the top of my incision" After review of incision encouraged pt that this is a normal post operative finding at this time. PT wt down 7 lbs. Pt walked outside today nearly one mile, without symptoms and is now feeling "sore". Encouraged pt this is normal finding post operatively. Pt advised of worsening symptoms  No further new symptoms, issues or concerns to report at this time.\par

## 2021-02-22 NOTE — REASON FOR VISIT
[Follow-Up: _____] : a [unfilled] follow-up visit [Family Member] : family member [FreeTextEntry1] : FOLLOW YOUR HEART- Transitional Care Management Program- [facility]\par \par

## 2021-02-22 NOTE — HISTORY OF PRESENT ILLNESS
[FreeTextEntry1] : 70 YOM with pmhx including CAD/MI s/p PCI x9, HTN, HLD, Hx Sinus Pauses, Tobacco Abuse+Hx prior PCI. Pt p/w CP that radiates to Left hand. Pt had a cat-> MVD-> OR 2/11 with Dr. Izquierdo for c3L. Post op course uneventful and pt discharged home to cousin's home with support of home care services. Follow up [facility] visit completed in home.  \par CC "I walked a lot and felt great, but now I'm sore"

## 2021-02-22 NOTE — PHYSICAL EXAM
[Sclera] : the sclera and conjunctiva were normal [Neck Appearance] : the appearance of the neck was normal [Respiration, Rhythm And Depth] : normal respiratory rhythm and effort [Exaggerated Use Of Accessory Muscles For Inspiration] : no accessory muscle use [Auscultation Breath Sounds / Voice Sounds] : lungs were clear to auscultation bilaterally [Apical Impulse] : the apical impulse was normal [Heart Rate And Rhythm] : heart rate was normal and rhythm regular [Heart Sounds] : normal S1 and S2 [Murmurs] : no murmurs [Chest Visual Inspection Thoracic Asymmetry] : no chest asymmetry [1+] : left 1+ [FreeTextEntry2] : B/L LE without edema, B/L calves soft, NT, ND  [Abnormal Walk] : normal gait [Musculoskeletal - Swelling] : no joint swelling seen [Skin Color & Pigmentation] : normal skin color and pigmentation [] : no rash [FreeTextEntry1] : SVG harvest site without erythema, warmth or drainage [Oriented To Time, Place, And Person] : oriented to person, place, and time [Impaired Insight] : insight and judgment were intact [Affect] : the affect was normal

## 2021-02-24 PROBLEM — Z95.1 S/P CORONARY ARTERY BYPASS GRAFT X 3: Status: ACTIVE | Noted: 2021-02-18

## 2021-03-03 ENCOUNTER — APPOINTMENT (OUTPATIENT)
Dept: CARDIOTHORACIC SURGERY | Facility: CLINIC | Age: 71
End: 2021-03-03
Payer: SELF-PAY

## 2021-03-03 VITALS
BODY MASS INDEX: 32.51 KG/M2 | SYSTOLIC BLOOD PRESSURE: 152 MMHG | WEIGHT: 240 LBS | OXYGEN SATURATION: 99 % | HEART RATE: 76 BPM | HEIGHT: 72 IN | DIASTOLIC BLOOD PRESSURE: 65 MMHG | RESPIRATION RATE: 16 BRPM

## 2021-03-03 DIAGNOSIS — I25.10 ATHEROSCLEROTIC HEART DISEASE OF NATIVE CORONARY ARTERY W/OUT ANGINA PECTORIS: ICD-10-CM

## 2021-03-03 DIAGNOSIS — Z95.1 PRESENCE OF AORTOCORONARY BYPASS GRAFT: ICD-10-CM

## 2021-03-03 PROCEDURE — 99024 POSTOP FOLLOW-UP VISIT: CPT

## 2021-03-03 RX ORDER — TORSEMIDE 20 MG/1
20 TABLET ORAL
Refills: 0 | Status: COMPLETED | COMMUNITY
End: 2021-03-03

## 2021-03-03 RX ORDER — POTASSIUM CHLORIDE 750 MG/1
10 TABLET, EXTENDED RELEASE ORAL
Refills: 0 | Status: COMPLETED | COMMUNITY
End: 2021-03-03

## 2021-03-23 ENCOUNTER — TRANSCRIPTION ENCOUNTER (OUTPATIENT)
Age: 71
End: 2021-03-23

## 2021-05-03 ENCOUNTER — NON-APPOINTMENT (OUTPATIENT)
Age: 71
End: 2021-05-03

## 2022-04-16 ENCOUNTER — EMERGENCY (EMERGENCY)
Facility: HOSPITAL | Age: 72
LOS: 1 days | Discharge: HOME | End: 2022-04-16
Attending: EMERGENCY MEDICINE
Payer: MEDICAID

## 2022-04-16 VITALS
SYSTOLIC BLOOD PRESSURE: 155 MMHG | DIASTOLIC BLOOD PRESSURE: 65 MMHG | OXYGEN SATURATION: 95 % | RESPIRATION RATE: 18 BRPM | HEART RATE: 76 BPM

## 2022-04-16 VITALS
RESPIRATION RATE: 18 BRPM | HEART RATE: 84 BPM | HEIGHT: 72 IN | DIASTOLIC BLOOD PRESSURE: 68 MMHG | OXYGEN SATURATION: 94 % | WEIGHT: 240.08 LBS | TEMPERATURE: 98 F | SYSTOLIC BLOOD PRESSURE: 94 MMHG

## 2022-04-16 DIAGNOSIS — Z95.5 PRESENCE OF CORONARY ANGIOPLASTY IMPLANT AND GRAFT: Chronic | ICD-10-CM

## 2022-04-16 LAB
ALBUMIN SERPL ELPH-MCNC: 3.2 G/DL — LOW (ref 3.3–5.2)
ALP SERPL-CCNC: 105 U/L — SIGNIFICANT CHANGE UP (ref 40–120)
ALT FLD-CCNC: 22 U/L — SIGNIFICANT CHANGE UP
ANION GAP SERPL CALC-SCNC: 13 MMOL/L — SIGNIFICANT CHANGE UP (ref 5–17)
ANISOCYTOSIS BLD QL: SLIGHT — SIGNIFICANT CHANGE UP
APPEARANCE UR: CLEAR — SIGNIFICANT CHANGE UP
AST SERPL-CCNC: 18 U/L — SIGNIFICANT CHANGE UP
BACTERIA # UR AUTO: ABNORMAL
BASOPHILS # BLD AUTO: 0.04 K/UL — SIGNIFICANT CHANGE UP (ref 0–0.2)
BASOPHILS NFR BLD AUTO: 0.2 % — SIGNIFICANT CHANGE UP (ref 0–2)
BILIRUB SERPL-MCNC: 0.3 MG/DL — LOW (ref 0.4–2)
BILIRUB UR-MCNC: NEGATIVE — SIGNIFICANT CHANGE UP
BUN SERPL-MCNC: 27.2 MG/DL — HIGH (ref 8–20)
CALCIUM SERPL-MCNC: 9 MG/DL — SIGNIFICANT CHANGE UP (ref 8.6–10.2)
CHLORIDE SERPL-SCNC: 102 MMOL/L — SIGNIFICANT CHANGE UP (ref 98–107)
CO2 SERPL-SCNC: 23 MMOL/L — SIGNIFICANT CHANGE UP (ref 22–29)
COLOR SPEC: YELLOW — SIGNIFICANT CHANGE UP
CREAT SERPL-MCNC: 1.1 MG/DL — SIGNIFICANT CHANGE UP (ref 0.5–1.3)
DIFF PNL FLD: NEGATIVE — SIGNIFICANT CHANGE UP
EGFR: 72 ML/MIN/1.73M2 — SIGNIFICANT CHANGE UP
EOSINOPHIL # BLD AUTO: 0.24 K/UL — SIGNIFICANT CHANGE UP (ref 0–0.5)
EOSINOPHIL NFR BLD AUTO: 1.5 % — SIGNIFICANT CHANGE UP (ref 0–6)
EPI CELLS # UR: SIGNIFICANT CHANGE UP
GLUCOSE SERPL-MCNC: 106 MG/DL — HIGH (ref 70–99)
GLUCOSE UR QL: NEGATIVE MG/DL — SIGNIFICANT CHANGE UP
HCT VFR BLD CALC: 32.9 % — LOW (ref 39–50)
HGB BLD-MCNC: 9.6 G/DL — LOW (ref 13–17)
IMM GRANULOCYTES NFR BLD AUTO: 1.6 % — HIGH (ref 0–1.5)
KETONES UR-MCNC: NEGATIVE — SIGNIFICANT CHANGE UP
LEUKOCYTE ESTERASE UR-ACNC: ABNORMAL
LYMPHOCYTES # BLD AUTO: 13.7 % — SIGNIFICANT CHANGE UP (ref 13–44)
LYMPHOCYTES # BLD AUTO: 2.22 K/UL — SIGNIFICANT CHANGE UP (ref 1–3.3)
MANUAL SMEAR VERIFICATION: SIGNIFICANT CHANGE UP
MCHC RBC-ENTMCNC: 20.9 PG — LOW (ref 27–34)
MCHC RBC-ENTMCNC: 29.2 GM/DL — LOW (ref 32–36)
MCV RBC AUTO: 71.5 FL — LOW (ref 80–100)
MICROCYTES BLD QL: SLIGHT — SIGNIFICANT CHANGE UP
MONOCYTES # BLD AUTO: 1.19 K/UL — HIGH (ref 0–0.9)
MONOCYTES NFR BLD AUTO: 7.3 % — SIGNIFICANT CHANGE UP (ref 2–14)
NEUTROPHILS # BLD AUTO: 12.25 K/UL — HIGH (ref 1.8–7.4)
NEUTROPHILS NFR BLD AUTO: 75.7 % — SIGNIFICANT CHANGE UP (ref 43–77)
NITRITE UR-MCNC: NEGATIVE — SIGNIFICANT CHANGE UP
PH UR: 5 — SIGNIFICANT CHANGE UP (ref 5–8)
PLAT MORPH BLD: NORMAL — SIGNIFICANT CHANGE UP
PLATELET # BLD AUTO: 350 K/UL — SIGNIFICANT CHANGE UP (ref 150–400)
POLYCHROMASIA BLD QL SMEAR: SLIGHT — SIGNIFICANT CHANGE UP
POTASSIUM SERPL-MCNC: 4 MMOL/L — SIGNIFICANT CHANGE UP (ref 3.5–5.3)
POTASSIUM SERPL-SCNC: 4 MMOL/L — SIGNIFICANT CHANGE UP (ref 3.5–5.3)
PROT SERPL-MCNC: 6.7 G/DL — SIGNIFICANT CHANGE UP (ref 6.6–8.7)
PROT UR-MCNC: 15
RBC # BLD: 4.6 M/UL — SIGNIFICANT CHANGE UP (ref 4.2–5.8)
RBC # FLD: 20 % — HIGH (ref 10.3–14.5)
RBC BLD AUTO: ABNORMAL
RBC CASTS # UR COMP ASSIST: ABNORMAL /HPF (ref 0–4)
SODIUM SERPL-SCNC: 137 MMOL/L — SIGNIFICANT CHANGE UP (ref 135–145)
SP GR SPEC: 1.02 — SIGNIFICANT CHANGE UP (ref 1.01–1.02)
UROBILINOGEN FLD QL: NEGATIVE MG/DL — SIGNIFICANT CHANGE UP
WBC # BLD: 16.2 K/UL — HIGH (ref 3.8–10.5)
WBC # FLD AUTO: 16.2 K/UL — HIGH (ref 3.8–10.5)
WBC UR QL: ABNORMAL /HPF (ref 0–5)

## 2022-04-16 PROCEDURE — 36415 COLL VENOUS BLD VENIPUNCTURE: CPT

## 2022-04-16 PROCEDURE — 85025 COMPLETE CBC W/AUTO DIFF WBC: CPT

## 2022-04-16 PROCEDURE — 80053 COMPREHEN METABOLIC PANEL: CPT

## 2022-04-16 PROCEDURE — 96374 THER/PROPH/DIAG INJ IV PUSH: CPT

## 2022-04-16 PROCEDURE — 99284 EMERGENCY DEPT VISIT MOD MDM: CPT | Mod: 25

## 2022-04-16 PROCEDURE — 87086 URINE CULTURE/COLONY COUNT: CPT

## 2022-04-16 PROCEDURE — 81001 URINALYSIS AUTO W/SCOPE: CPT

## 2022-04-16 PROCEDURE — 99284 EMERGENCY DEPT VISIT MOD MDM: CPT

## 2022-04-16 RX ORDER — CEFTRIAXONE 500 MG/1
1000 INJECTION, POWDER, FOR SOLUTION INTRAMUSCULAR; INTRAVENOUS ONCE
Refills: 0 | Status: COMPLETED | OUTPATIENT
Start: 2022-04-16 | End: 2022-04-16

## 2022-04-16 RX ORDER — CIPROFLOXACIN LACTATE 400MG/40ML
1 VIAL (ML) INTRAVENOUS
Qty: 14 | Refills: 0
Start: 2022-04-16 | End: 2022-04-22

## 2022-04-16 RX ORDER — PHENAZOPYRIDINE HCL 100 MG
1 TABLET ORAL
Qty: 9 | Refills: 0
Start: 2022-04-16 | End: 2022-04-18

## 2022-04-16 RX ORDER — PHENAZOPYRIDINE HCL 100 MG
200 TABLET ORAL ONCE
Refills: 0 | Status: COMPLETED | OUTPATIENT
Start: 2022-04-16 | End: 2022-04-16

## 2022-04-16 RX ORDER — KETOROLAC TROMETHAMINE 30 MG/ML
15 SYRINGE (ML) INJECTION ONCE
Refills: 0 | Status: DISCONTINUED | OUTPATIENT
Start: 2022-04-16 | End: 2022-04-16

## 2022-04-16 RX ADMIN — CEFTRIAXONE 100 MILLIGRAM(S): 500 INJECTION, POWDER, FOR SOLUTION INTRAMUSCULAR; INTRAVENOUS at 13:15

## 2022-04-16 RX ADMIN — Medication 200 MILLIGRAM(S): at 13:15

## 2022-04-16 NOTE — ED STATDOCS - ATTENDING CONTRIBUTION TO CARE
I, Sixto Ca, performed the initial face to face bedside interview with this patient regarding history of present illness, review of symptoms and relevant past medical, social and family history.  I completed an independent physical examination.  I was the initial provider who evaluated this patient. I have signed out the follow up of any pending tests (i.e. labs, radiological studies) to the ACP.  I have communicated the patient’s plan of care and disposition with the ACP.

## 2022-04-16 NOTE — ED ADULT NURSE REASSESSMENT NOTE - NS ED NURSE REASSESS COMMENT FT1
leg bag placed, VSS, pt aware of need for uro follow up. AOX3, afebrile, even and unlabored resps present. leg bag teaching completed.

## 2022-04-16 NOTE — ED STATDOCS - PATIENT PORTAL LINK FT
You can access the FollowMyHealth Patient Portal offered by [facility] by registering at the following website: [email or web address]. By joining [employer]’s FollowMyHealth portal, you will also be able to view your health information using other applications (apps) compatible with our system.

## 2022-04-16 NOTE — ED STATDOCS - CARE PROVIDER_API CALL
Jason Carcamo)  Urology  [address]  [place] [address]  Phone: [phone]  Fax: [phone]  Follow Up Time: 1-3 Days

## 2022-04-16 NOTE — ED STATDOCS - NS ED ATTENDING STATEMENT MOD
This was a shared visit with the TAWANNA. I reviewed and verified the documentation and independently performed the documented:

## 2022-04-16 NOTE — ED ADULT NURSE NOTE - OBJECTIVE STATEMENT
pt reporting diff urinating and dribbling x few days, subjective fever and chills 3 nights ago. pt reporting no hematuria, reports intermittent back pain bilaterally as well. no abdominal pain, no tenderness, no nausea/vomiting, no other complaints.

## 2022-04-16 NOTE — ED STATDOCS - OBJECTIVE STATEMENT
72 y/o male with PMHx of , c/o urinary/bladder trouble. Patient reports having difficulty urinating and having to force himself to urinate x 1 week. Patient went to [facility] and was given medication for UTI. Denies hematuria or hx of enlarge prostate. No testicular pain or redness.

## 2022-04-16 NOTE — ED STATDOCS - CLINICAL SUMMARY MEDICAL DECISION MAKING FREE TEXT BOX
Urinary symptoms, will eval for rentention vs UTI. Will check UA, bladder scan and if significant urine in bladder will need Das.

## 2022-04-16 NOTE — ED STATDOCS - DISPOSITION TYPE
[Sclera] : the sclera and conjunctiva were normal [PERRL With Normal Accommodation] : pupils were equal in size, round, and reactive to light [Extraocular Movements] : extraocular movements were intact [FreeTextEntry1] : Healed tracheostomy scar with a deep depression in the skin, no evidence of infection or drainage [Auscultation Breath Sounds / Voice Sounds] : lungs were clear to auscultation bilaterally [Heart Rate And Rhythm] : heart rate was normal and rhythm regular [Heart Sounds] : normal S1 and S2 [Heart Sounds Gallop] : no gallops [Murmurs] : no murmurs [Heart Sounds Pericardial Friction Rub] : no pericardial rub [Examination Of The Chest] : the chest was normal in appearance [Chest Visual Inspection Thoracic Asymmetry] : no chest asymmetry [Diminished Respiratory Excursion] : normal chest expansion [Bowel Sounds] : normal bowel sounds [Abdomen Soft] : soft [Abdomen Tenderness] : non-tender [Abdomen Mass (___ Cm)] : no abdominal mass palpated [Cervical Lymph Nodes Enlarged Posterior Bilaterally] : posterior cervical [Cervical Lymph Nodes Enlarged Anterior Bilaterally] : anterior cervical [Supraclavicular Lymph Nodes Enlarged Bilaterally] : supraclavicular [Axillary Lymph Nodes Enlarged Bilaterally] : axillary [Femoral Lymph Nodes Enlarged Bilaterally] : femoral [Inguinal Lymph Nodes Enlarged Bilaterally] : inguinal [Skin Color & Pigmentation] : normal skin color and pigmentation [Skin Turgor] : normal skin turgor [] : no rash [Deep Tendon Reflexes (DTR)] : deep tendon reflexes were 2+ and symmetric [Sensation] : the sensory exam was normal to light touch and pinprick [No Focal Deficits] : no focal deficits DISCHARGE

## 2022-04-18 LAB
CULTURE RESULTS: NO GROWTH — SIGNIFICANT CHANGE UP
SPECIMEN SOURCE: SIGNIFICANT CHANGE UP

## 2022-04-20 ENCOUNTER — APPOINTMENT (OUTPATIENT)
Dept: UROLOGY | Facility: CLINIC | Age: 72
End: 2022-04-20
Payer: SELF-PAY

## 2022-04-20 VITALS
DIASTOLIC BLOOD PRESSURE: 71 MMHG | BODY MASS INDEX: 32.51 KG/M2 | HEIGHT: 72 IN | SYSTOLIC BLOOD PRESSURE: 120 MMHG | HEART RATE: 86 BPM | WEIGHT: 240 LBS

## 2022-04-20 PROCEDURE — 99204 OFFICE O/P NEW MOD 45 MIN: CPT

## 2022-04-20 RX ORDER — TAMSULOSIN HYDROCHLORIDE 0.4 MG/1
0.4 CAPSULE ORAL
Qty: 60 | Refills: 3 | Status: ACTIVE | COMMUNITY
Start: 2022-04-20 | End: 1900-01-01

## 2022-04-21 NOTE — HISTORY OF PRESENT ILLNESS
[FreeTextEntry1] : 70 yo M for initial consultation due to urinary retention\par PMH: CABG 2021, on aspirin and plavix, HTN\par NKDA\par still a smoker\par no FH of prostate cancer\par knows he had PSA testing with PCP and was normal\par \par passed a stone in the past \par \par 6 weeks ago started with back pain, that started also radiating to both legs\par after that started with difficulty voiding, seen in urgent care and started on levofloxacin\par this weekend was at [facility], and a urethral catheter was inserted\par is not on any alpha blocker\par \par the patient is very interested in a trial of void, understands he may go into retention again, and may be better to start medication for a few days first, still interested in TOV.\par also explained he may have a larger neurological problem due to his back pain, and if symptoms persist will need UDS and cystoscopy.\par \par plan: \par - meehan removed today\par - will start tamsulosin\par - ordered ultrasound\par - will schedule next visit in 2 weeks for uroflow and PVR

## 2022-04-21 NOTE — ASSESSMENT
[FreeTextEntry1] : \par plan: \par - meehan removed today\par - will start tamsulosin\par - ordered ultrasound\par - will schedule next visit in 2 weeks for uroflow and PVR

## 2022-04-26 ENCOUNTER — APPOINTMENT (OUTPATIENT)
Dept: UROLOGY | Facility: CLINIC | Age: 72
End: 2022-04-26
Payer: MEDICAID

## 2022-04-26 VITALS — HEART RATE: 84 BPM | DIASTOLIC BLOOD PRESSURE: 88 MMHG | SYSTOLIC BLOOD PRESSURE: 192 MMHG

## 2022-04-26 PROCEDURE — 99215 OFFICE O/P EST HI 40 MIN: CPT

## 2022-04-26 NOTE — ASSESSMENT
[FreeTextEntry1] : \par plan:\par - now going to the ER\par - orthopedic consult\par - will need UDS\par - meehan exchange in 5 weeks

## 2022-04-26 NOTE — HISTORY OF PRESENT ILLNESS
[FreeTextEntry1] : 70 yo M for follow up\par see full notes from previous visit \par \par meehan removed recently, patient knows he might go into retention again\par now feeling difficulty voiding\par PVR > 1500\par 16F meehan inserted with ease, clear urine\par drained 2000 ml\par \par after patient had some pain resembling what he was feeling at home\par he also mentioned mild chest pain that resolved\par now feeling better but will still go to the ER to be evaluated due to his cardiac history\par \par \par plan:\par - now going to the ER\par - orthopedic consult\par - will need UDS\par - meehan exchange in 5 weeks

## 2022-05-04 ENCOUNTER — APPOINTMENT (OUTPATIENT)
Dept: UROLOGY | Facility: CLINIC | Age: 72
End: 2022-05-04

## 2022-06-08 ENCOUNTER — APPOINTMENT (OUTPATIENT)
Dept: UROLOGY | Facility: CLINIC | Age: 72
End: 2022-06-08
Payer: MEDICAID

## 2022-06-08 VITALS — HEART RATE: 73 BPM | SYSTOLIC BLOOD PRESSURE: 124 MMHG | DIASTOLIC BLOOD PRESSURE: 73 MMHG

## 2022-06-08 PROCEDURE — 51797 INTRAABDOMINAL PRESSURE TEST: CPT

## 2022-06-08 PROCEDURE — 51798 US URINE CAPACITY MEASURE: CPT | Mod: 59

## 2022-06-08 PROCEDURE — 51784 ANAL/URINARY MUSCLE STUDY: CPT

## 2022-06-08 PROCEDURE — 51741 ELECTRO-UROFLOWMETRY FIRST: CPT

## 2022-06-08 PROCEDURE — 51728 CYSTOMETROGRAM W/VP: CPT

## 2022-06-24 ENCOUNTER — APPOINTMENT (OUTPATIENT)
Dept: UROLOGY | Facility: CLINIC | Age: 72
End: 2022-06-24
Payer: MEDICAID

## 2022-06-24 PROCEDURE — 99213 OFFICE O/P EST LOW 20 MIN: CPT

## 2022-06-24 NOTE — HISTORY OF PRESENT ILLNESS
[FreeTextEntry1] : 72 yo M for follow up due to LUTS and urinary retention\par see previous notes including UDS study\par patient now on CIC\par still no ultrasound or PSA\par patient knows he had his PSA followed with his PCP in the past\par \par able to void now, still does not empty completely \par still doing CIC\par interested in surgical options \par explained the need to assess the size of the prostate and confirm his PSA first\par \par \par plan:\par - ordered new PSA\par - ordered ultrasound\par - schedule cystoscopy to discuss results and plan

## 2022-06-24 NOTE — ASSESSMENT
[FreeTextEntry1] : \par \par plan:\par - ordered new PSA\par - ordered ultrasound\par - schedule cystoscopy to discuss results and plan

## 2022-08-05 ENCOUNTER — NON-APPOINTMENT (OUTPATIENT)
Age: 72
End: 2022-08-05

## 2022-08-13 ENCOUNTER — APPOINTMENT (OUTPATIENT)
Dept: ULTRASOUND IMAGING | Facility: CLINIC | Age: 72
End: 2022-08-13

## 2022-08-13 ENCOUNTER — OUTPATIENT (OUTPATIENT)
Dept: OUTPATIENT SERVICES | Facility: HOSPITAL | Age: 72
LOS: 1 days | End: 2022-08-13
Payer: MEDICAID

## 2022-08-13 DIAGNOSIS — R33.9 RETENTION OF URINE, UNSPECIFIED: ICD-10-CM

## 2022-08-13 DIAGNOSIS — Z95.5 PRESENCE OF CORONARY ANGIOPLASTY IMPLANT AND GRAFT: Chronic | ICD-10-CM

## 2022-08-13 PROCEDURE — 76770 US EXAM ABDO BACK WALL COMP: CPT

## 2022-08-13 PROCEDURE — 76770 US EXAM ABDO BACK WALL COMP: CPT | Mod: 26

## 2022-08-17 LAB
PSA FREE FLD-MCNC: 10 %
PSA FREE SERPL-MCNC: 0.83 NG/ML
PSA SERPL-MCNC: 8.19 NG/ML

## 2022-08-19 ENCOUNTER — APPOINTMENT (OUTPATIENT)
Dept: UROLOGY | Facility: CLINIC | Age: 72
End: 2022-08-19

## 2022-08-19 VITALS — DIASTOLIC BLOOD PRESSURE: 76 MMHG | HEART RATE: 84 BPM | SYSTOLIC BLOOD PRESSURE: 162 MMHG

## 2022-08-19 DIAGNOSIS — R33.9 RETENTION OF URINE, UNSPECIFIED: ICD-10-CM

## 2022-08-19 LAB
BILIRUB UR QL STRIP: NORMAL
CLARITY UR: CLEAR
COLLECTION METHOD: NORMAL
GLUCOSE UR-MCNC: NORMAL
HCG UR QL: 0.2 EU/DL
HGB UR QL STRIP.AUTO: NORMAL
KETONES UR-MCNC: NORMAL
LEUKOCYTE ESTERASE UR QL STRIP: NORMAL
NITRITE UR QL STRIP: NORMAL
PH UR STRIP: 7
PROT UR STRIP-MCNC: NORMAL
SP GR UR STRIP: 1.02

## 2022-08-19 PROCEDURE — 52000 CYSTOURETHROSCOPY: CPT

## 2022-08-19 NOTE — HISTORY OF PRESENT ILLNESS
[FreeTextEntry1] : \par cysto today: BPH, mainly left lobe, trabeculated bladder\par PSA 8.19 (free 10%)\par does CIC twice a day\par \par discussed the results with the patient\par understands PSA may be to CIC, but still needs further work up before surgery\par will order a new PSA and MRI to plan fusion biopsies\par \par patient understands and agrees to plan

## 2022-09-10 ENCOUNTER — APPOINTMENT (OUTPATIENT)
Dept: MRI IMAGING | Facility: CLINIC | Age: 72
End: 2022-09-10

## 2022-09-14 ENCOUNTER — APPOINTMENT (OUTPATIENT)
Dept: UROLOGY | Facility: CLINIC | Age: 72
End: 2022-09-14

## 2022-12-04 NOTE — ED ADULT NURSE NOTE - PRO INTERPRETER NEED 2
Subjective   Patient ID: Lizandro is a 54 year old male.    Chief Complaint   Patient presents with   • Sore Throat     Started Wednesday    • Neck Pain     Started Wednesday      HPI  This is a 54-year-old gentleman, complains about sore throat since Wednesday, with also a little bit of coughing, and mild headache, denied any fever, no chills, no loss of appetite, no GI symptoms, he is sore throat is so bad that it is hard for him to swallow.  He denied any sick contact.    History reviewed. No pertinent past medical history.    MEDICATIONS:  Current Outpatient Medications   Medication Sig   • amLODIPine (NORVASC) 10 MG tablet TAKE 1 TABLET BY MOUTH EVERY DAY   • aspirin (ECOTRIN) 81 MG EC tablet    • Barberry-Oreg Grape-Goldenseal (BERBERINE COMPLEX PO) Take by mouth daily.   • MAGNESIUM HYDROXIDE PO Take by mouth daily.   • MULTIPLE VITAMINS-MINERALS PO Take by mouth daily.   • PROBIOTIC PRODUCT PO Take by mouth daily.   • TURMERIC PO    • valsartan-hydroCHLOROthiazide (DIOVAN-HCT) 80-12.5 MG per tablet    • VITAMIN D-VITAMIN K PO Take 5,000 mcg by mouth daily.   • Ginkgo Biloba 40 MG Tab Take by mouth daily.   • losartan (COZAAR) 100 MG tablet Take 100 mg by mouth daily.   • Omega-3 Fatty Acids (OMEGA-3 FISH OIL) 1000 MG capsule Take 2 g by mouth daily.   • vitamin E 100 units capsule Take 100 Units by mouth daily.   • omega-3 acid ethyl esters (LOVAZA) 1 g capsule TAKE 2 CAPSULES BY MOUTH TWICE A DAY   • labetalol (NORMODYNE) 300 MG tablet TAKE 1 TABLET BY MOUTH EVERY 12 HOURS   • testosterone cypionate (DEPO-TESTOSTERONE) 100 MG/ML injectable solution INJECT 1.25ML ONCE WEEKLY AS DIRECTED     No current facility-administered medications for this visit.       ALLERGIES:  ALLERGIES:  No Known Allergies    PAST SURGICAL HISTORY:  History reviewed. No pertinent surgical history.    FAMILY HISTORY:  History reviewed. No pertinent family history.    SOCIAL HISTORY:  Social History     Tobacco Use   • Smoking status:  Never Smoker   • Smokeless tobacco: Never Used   Substance Use Topics   • Alcohol use: Yes     Comment: socially   • Drug use: Never         Patient's medications, allergies, past medical, surgical, social and family histories were reviewed and updated as appropriate.  Patient's medications, allergies, past medical, surgical, and social history  were reviewed and updated as appropriate.    Review of Systems   Constitutional: Negative for activity change, appetite change, chills, fatigue, fever and unexpected weight change.   HENT: Positive for sore throat. Negative for congestion, ear discharge, ear pain, facial swelling, hearing loss, mouth sores, postnasal drip, rhinorrhea, sinus pressure, sinus pain, sneezing, trouble swallowing and voice change.    Eyes: Negative for pain, redness and visual disturbance.   Respiratory: Positive for cough. Negative for choking, chest tightness, shortness of breath and wheezing.    Cardiovascular: Negative for chest pain, palpitations and leg swelling.   Gastrointestinal: Negative for abdominal pain, constipation, diarrhea, nausea and vomiting.   Musculoskeletal: Negative for myalgias.   Skin: Negative.    Neurological: Positive for headaches. Negative for dizziness and weakness.       Objective   Physical Exam  Constitutional:       General: He is not in acute distress.     Appearance: Normal appearance. He is not toxic-appearing.   HENT:      Head: Normocephalic.      Right Ear: Tympanic membrane, ear canal and external ear normal. There is no impacted cerumen.      Left Ear: Tympanic membrane, ear canal and external ear normal. There is no impacted cerumen.      Nose: Congestion present. No rhinorrhea.      Mouth/Throat:      Mouth: Mucous membranes are moist.      Pharynx: Posterior oropharyngeal erythema present. No oropharyngeal exudate.      Neck: Normal range of motion. No rigidity or tenderness.   Eyes:      General:         Right eye: No discharge.         Left eye: No  discharge.      Conjunctiva/sclera: Conjunctivae normal.      Pupils: Pupils are equal, round, and reactive to light.   Cardiovascular:      Rate and Rhythm: Normal rate and regular rhythm.      Heart sounds: Normal heart sounds.   Pulmonary:      Effort: Pulmonary effort is normal. No respiratory distress.      Breath sounds: No wheezing, rhonchi or rales.   Abdominal:      Palpations: There is no mass.      Tenderness: There is no abdominal tenderness. There is no guarding.      Hernia: No hernia is present.   Lymphadenopathy:      Cervical: No cervical adenopathy.   Neurological:      General: No focal deficit present.      Mental Status: He is alert. Mental status is at baseline.      Sensory: No sensory deficit.      Motor: No weakness.      Coordination: Coordination normal.      Gait: Gait normal.      Deep Tendon Reflexes: Reflexes normal.       Visit Vitals  BP (!) 150/92 (BP Location: RUE - Right upper extremity, Patient Position: Sitting, Cuff Size: Large Adult)   Pulse 86   Temp 98.5 °F (36.9 °C) (Oral)   Resp 18   Ht 6' 1\" (1.854 m)   Wt 91.5 kg (201 lb 11.5 oz)   SpO2 99%   BMI 26.61 kg/m²       Assessment   Problem List Items Addressed This Visit    None     Visit Diagnoses     Sore throat    -  Primary    Relevant Orders    POCT RAPID STREP A (Completed)    THROAT, BACTERIAL CULTURE    Acute URI              Patient is advised to take Tylenol or Motrin for fever and pain control  use sinus irrigation for nasal congestion and discharge (use Neilmed sinus irritation bottle, can add Hydrogen peroxide to make final concentration between 0.1% to 0.5% depending on your tolerance, because it can cause stinging), also steam treatment for congestion  take lozenges or Chloraseptic spray, salt water gargle mouth for sore throat.    Drink plenty of hot green tea with kin  Also recommend the patient start taking vitamin D3 2000 international unit daily, vitamin C 500 mg 3-4 times a day, magnesium 400 mg  milligrams daily, and fish oil 1 to 2 capsules a day to boost immunity.  Follow-up with PCP in 1 week if there is no improvement or worsening of symptoms.  Patient verbalized understanding the instructions.  Answered patient's questions to their satisfaction.      Instructions provided as documented in the AVS.    Thank you for visiting [facility].     English

## 2023-02-20 NOTE — PATIENT PROFILE ADULT. - VISION (WITH CORRECTIVE LENSES IF THE PATIENT USUALLY WEARS THEM):
sierra all pertinent systems negative Normal vision: sees adequately in most situations; can see medication labels, newsprint

## 2024-02-26 NOTE — CHART NOTE - NSCHARTNOTEFT_GEN_A_CORE
Called by RN for patient requesting to be made DNI.  Spoke with patient at bedside regarding his wishes. I thoroughly explained the meaning of DNR and DNI to the patient. After discussion the patient chose to hold off on completing a MOLST form at this time and stated that he would like to talk to family first prior to making these decisions.   Patient also states that in the interim, in the case that he may be unable to make his own decisions, that he would like his niece, Jacqueline Wells @ [phone], to make decisions for him.  I answered all questions in full.   Made Dr. Ramirez aware of above conversation. No